# Patient Record
Sex: MALE | Employment: OTHER | URBAN - METROPOLITAN AREA
[De-identification: names, ages, dates, MRNs, and addresses within clinical notes are randomized per-mention and may not be internally consistent; named-entity substitution may affect disease eponyms.]

---

## 2017-01-18 RX ORDER — TRIAMTERENE/HYDROCHLOROTHIAZID 37.5-25 MG
TABLET ORAL
Qty: 30 TAB | Refills: 1 | Status: SHIPPED | OUTPATIENT
Start: 2017-01-18 | End: 2017-02-20 | Stop reason: SDUPTHER

## 2017-01-18 NOTE — TELEPHONE ENCOUNTER
Attempted to reach pt to discuss refill and f/u . No answer. Faxed back refill w/ note to pharmacy that he needs to return to see a provider. Refill was done for 1 month and 1 refill.

## 2017-02-03 ENCOUNTER — TELEPHONE (OUTPATIENT)
Dept: FAMILY MEDICINE CLINIC | Age: 52
End: 2017-02-03

## 2017-02-20 ENCOUNTER — HOSPITAL ENCOUNTER (OUTPATIENT)
Dept: LAB | Age: 52
Discharge: HOME | End: 2017-02-20

## 2017-02-20 ENCOUNTER — OFFICE VISIT (OUTPATIENT)
Dept: FAMILY MEDICINE CLINIC | Age: 52
End: 2017-02-20

## 2017-02-20 VITALS
TEMPERATURE: 98.2 F | WEIGHT: 254 LBS | HEIGHT: 69 IN | SYSTOLIC BLOOD PRESSURE: 147 MMHG | BODY MASS INDEX: 37.62 KG/M2 | HEART RATE: 68 BPM | DIASTOLIC BLOOD PRESSURE: 93 MMHG

## 2017-02-20 DIAGNOSIS — E78.00 HIGH CHOLESTEROL: ICD-10-CM

## 2017-02-20 DIAGNOSIS — I10 UNSPECIFIED ESSENTIAL HYPERTENSION: ICD-10-CM

## 2017-02-20 DIAGNOSIS — E11.9 CONTROLLED TYPE 2 DIABETES MELLITUS WITHOUT COMPLICATION, WITHOUT LONG-TERM CURRENT USE OF INSULIN (HCC): ICD-10-CM

## 2017-02-20 DIAGNOSIS — E11.9 CONTROLLED TYPE 2 DIABETES MELLITUS WITHOUT COMPLICATION, WITHOUT LONG-TERM CURRENT USE OF INSULIN (HCC): Primary | ICD-10-CM

## 2017-02-20 LAB
ALBUMIN SERPL BCP-MCNC: 4.4 G/DL (ref 3.5–5)
ALBUMIN/GLOB SERPL: 1.3 {RATIO} (ref 1.1–2.2)
ALP SERPL-CCNC: 102 U/L (ref 45–117)
ALT SERPL-CCNC: 51 U/L (ref 12–78)
ANION GAP BLD CALC-SCNC: 8 MMOL/L (ref 5–15)
AST SERPL W P-5'-P-CCNC: 31 U/L (ref 15–37)
BILIRUB SERPL-MCNC: 0.4 MG/DL (ref 0.2–1)
BUN SERPL-MCNC: 12 MG/DL (ref 6–20)
BUN/CREAT SERPL: 10 (ref 12–20)
CALCIUM SERPL-MCNC: 9.4 MG/DL (ref 8.5–10.1)
CHLORIDE SERPL-SCNC: 95 MMOL/L (ref 97–108)
CO2 SERPL-SCNC: 31 MMOL/L (ref 21–32)
CREAT SERPL-MCNC: 1.21 MG/DL (ref 0.7–1.3)
CREAT UR-MCNC: 55.1 MG/DL
EST. AVERAGE GLUCOSE BLD GHB EST-MCNC: 189 MG/DL
GLOBULIN SER CALC-MCNC: 3.4 G/DL (ref 2–4)
GLUCOSE POC: NORMAL MG/DL
GLUCOSE SERPL-MCNC: 141 MG/DL (ref 65–100)
HBA1C MFR BLD: 8.2 % (ref 4.2–6.3)
MICROALBUMIN UR-MCNC: <0.5 MG/DL
MICROALBUMIN/CREAT UR-RTO: NORMAL MG/G (ref 0–30)
POTASSIUM SERPL-SCNC: 3.5 MMOL/L (ref 3.5–5.1)
PROT SERPL-MCNC: 7.8 G/DL (ref 6.4–8.2)
SODIUM SERPL-SCNC: 134 MMOL/L (ref 136–145)
TSH SERPL DL<=0.05 MIU/L-ACNC: 2.35 UIU/ML (ref 0.36–3.74)

## 2017-02-20 PROCEDURE — 80053 COMPREHEN METABOLIC PANEL: CPT | Performed by: NURSE PRACTITIONER

## 2017-02-20 PROCEDURE — 83036 HEMOGLOBIN GLYCOSYLATED A1C: CPT | Performed by: NURSE PRACTITIONER

## 2017-02-20 PROCEDURE — 82043 UR ALBUMIN QUANTITATIVE: CPT | Performed by: NURSE PRACTITIONER

## 2017-02-20 PROCEDURE — 84443 ASSAY THYROID STIM HORMONE: CPT | Performed by: NURSE PRACTITIONER

## 2017-02-20 RX ORDER — TRIAMTERENE/HYDROCHLOROTHIAZID 37.5-25 MG
TABLET ORAL
Qty: 30 TAB | Refills: 1 | Status: SHIPPED | OUTPATIENT
Start: 2017-02-20 | End: 2017-04-04 | Stop reason: SDUPTHER

## 2017-02-20 RX ORDER — METOPROLOL TARTRATE 25 MG/1
TABLET, FILM COATED ORAL
Qty: 60 TAB | Refills: 1 | Status: SHIPPED | OUTPATIENT
Start: 2017-02-20 | End: 2017-03-21 | Stop reason: SDUPTHER

## 2017-02-20 RX ORDER — METFORMIN HYDROCHLORIDE 500 MG/1
TABLET, EXTENDED RELEASE ORAL
Qty: 90 TAB | Refills: 1 | Status: SHIPPED | OUTPATIENT
Start: 2017-02-20 | End: 2017-02-21 | Stop reason: SDUPTHER

## 2017-02-20 RX ORDER — LOVASTATIN 20 MG/1
40 TABLET ORAL
Qty: 60 TAB | Refills: 5 | Status: SHIPPED | OUTPATIENT
Start: 2017-02-20 | End: 2017-04-04 | Stop reason: SDUPTHER

## 2017-02-20 NOTE — PROGRESS NOTES
Assessment/Plan:       ICD-10-CM ICD-9-CM    1. Controlled type 2 diabetes mellitus without complication, without long-term current use of insulin (HCC) E11.9 250.00 AMB POC GLUCOSE BLOOD, BY GLUCOSE MONITORING DEVICE      MICROALBUMIN, UR, RAND W/ MICROALBUMIN/CREA RATIO      HEMOGLOBIN A1C WITH EAG      HM DIABETES FOOT EXAM      metFORMIN ER (GLUCOPHAGE XR) 500 mg tablet   2. Unspecified essential hypertension K34 527.9 METABOLIC PANEL, COMPREHENSIVE      triamterene-hydroCHLOROthiazide (MAXZIDE) 37.5-25 mg per tablet      metoprolol tartrate (LOPRESSOR) 25 mg tablet   3. High cholesterol E78.00 272.0 TSH 3RD GENERATION      lovastatin (MEVACOR) 20 mg tablet   10k training, starts next week until April; walks his dogs 30 min 3 times a week. Not eating much red meat; eats a green every day; had breakfast for supper last night at [facility]; also ate yogurt that morning and a banana and juice and tea, honey and lime. We reviewed high carbohydrate foods, that diet will need to be different during training and he can use that opportunity to try to lose some weight/eat less.   -check bp on the outside - less than 140/90? Will have him return for this.  -asking about prostate exam.  Will need to get history, discuss pros/cons of PSA, also asking about REMA  Follow-up Disposition:  Return in about 4 weeks (around 3/20/2017) for check htn; discuss prostate exam pros/cons. [facility]  Subjective:   Martha Galo is a 46 y.o. PATIENT REFUSED male who speaks [place]. Chief Complaint   Patient presents with    Medication Refill    History of Present Illness:  taking alkaselzer cold at nighth and mucinex.   Diastolic 11-21; 097-961  Fasting blood sugar: 110 low to 130, 151;   Current Outpatient Prescriptions   Medication Sig Dispense Refill    triamterene-hydroCHLOROthiazide (MAXZIDE) 37.5-25 mg per tablet TAKE ONE TABLET BY MOUTH ONE TIME DAILY 30 Tab 1    metFORMIN ER (GLUCOPHAGE XR) 500 mg tablet Take 1 pill with breakfast and then 2 with dinner 90 Tab 1    lovastatin (MEVACOR) 20 mg tablet Take 2 Tabs by mouth nightly. 60 Tab 5    metoprolol tartrate (LOPRESSOR) 25 mg tablet TAKE ONE TABLET BY MOUTH TWICE DAILY for hypertension 60 Tab 1       Review of Systems: Negative for: fever, chest pain, shortness of breath, leg swelling, exertional dyspnea, palpitations. Past Surgical History: He  has no past surgical history on file. Social History: He  reports that he has never smoked. He does not have any smokeless tobacco history on file. He reports that he does not drink alcohol or use illicit drugs. Objective:     Vitals:    02/20/17 0845 02/20/17 0851   BP: (!) 161/98 (!) 147/93   Pulse: 68 68   Temp: 98.2 °F (36.8 °C)    TempSrc: Oral    Weight: 254 lb (115.2 kg)    Height: 5' 8.5\" (1.74 m)     No LMP for male patient. Wt Readings from Last 2 Encounters:   02/20/17 254 lb (115.2 kg)   07/29/16 248 lb (112.5 kg)     Lab Review:  Results for orders placed or performed in visit on 02/20/17   AMB POC GLUCOSE BLOOD, BY GLUCOSE MONITORING DEVICE   Result Value Ref Range    Glucose POC fasting 164 mg/dL    ate banana and apple; nonfasting above  Physical Examination: Normal  foot exam.  General appearance - well developed, no acute distress. Chest - clear to auscultation. Heart - regular rate and rhythm without murmurs, rubs, or gallops. Abdomen - bowel sounds present x 4, NT, ND. Extremities - pulses intact. No peripheral edema. Assessment/Plan:   Cleopatra Fry was seen today for medication refill. Diagnoses and all orders for this visit:    Controlled type 2 diabetes mellitus without complication, without long-term current use of insulin (HCC)  -     AMB POC GLUCOSE BLOOD, BY GLUCOSE MONITORING DEVICE  -     MICROALBUMIN, UR, RAND W/ MICROALBUMIN/CREA RATIO; Future  -     HEMOGLOBIN A1C WITH EAG; Future  -      DIABETES FOOT EXAM  -     metFORMIN ER (GLUCOPHAGE XR) 500 mg tablet;  Take 1 pill with breakfast and then 2 with dinner    Unspecified essential hypertension  -     METABOLIC PANEL, COMPREHENSIVE; Future  -     triamterene-hydroCHLOROthiazide (MAXZIDE) 37.5-25 mg per tablet; TAKE ONE TABLET BY MOUTH ONE TIME DAILY  -     metoprolol tartrate (LOPRESSOR) 25 mg tablet; TAKE ONE TABLET BY MOUTH TWICE DAILY for hypertension    High cholesterol  -     TSH 3RD GENERATION; Future  -     lovastatin (MEVACOR) 20 mg tablet; Take 2 Tabs by mouth nightly.    -eye exam was more than 3 years ago; gave him a letter for optometrist to fax back to me at [facility]  Follow-up Disposition:  Return in about 4 weeks (around 3/20/2017) for check htn; discuss prostate exam pros/cons. Ilia Jacinto, MSN, RN, FNP-BC, BC-ADM  Janiya Anne expressed understanding of this plan. Assessment/Plan:       ICD-10-CM ICD-9-CM    1. Controlled type 2 diabetes mellitus without complication, without long-term current use of insulin (HCC) E11.9 250.00 AMB POC GLUCOSE BLOOD, BY GLUCOSE MONITORING DEVICE      MICROALBUMIN, UR, RAND W/ MICROALBUMIN/CREA RATIO      HEMOGLOBIN A1C WITH EAG      HM DIABETES FOOT EXAM      metFORMIN ER (GLUCOPHAGE XR) 500 mg tablet   2. Unspecified essential hypertension F81 309.8 METABOLIC PANEL, COMPREHENSIVE      triamterene-hydroCHLOROthiazide (MAXZIDE) 37.5-25 mg per tablet      metoprolol tartrate (LOPRESSOR) 25 mg tablet   3. High cholesterol E78.00 272.0 TSH 3RD GENERATION      lovastatin (MEVACOR) 20 mg tablet    Greater than 50% of this 25 minute visit was spent in face-to-face counseling/coordination of care regarding diabetes management. Follow-up Disposition:  Return in about 4 weeks (around 3/20/2017) for check htn; discuss prostate exam pros/cons.      Health Maintenance Due   Topic    Hepatitis C Screening     EYE EXAM RETINAL OR DILATED Q1     Pneumococcal 19-64 Medium Risk (1 of 1 - PPSV23)    DTaP/Tdap/Td series (1 - Tdap)    FOOT EXAM Q1     FOBT Q 1 YEAR AGE 50-75     MICROALBUMIN Q1     INFLUENZA AGE 9 TO ADULT     LIPID PANEL Q1     HEMOGLOBIN A1C Q6M          [facility] [place]  Subjective:   Stefani Moss is a 46 y.o. PATIENT REFUSED male who speaks [place]. Chief Complaint   Patient presents with    Medication Refill   Social History: He reports that he has never smoked. He does not have any smokeless tobacco history on file. He reports that he does not drink alcohol or use illicit drugs. Family History: His family history is not on file. Outpatient Medications Prior to Visit   Medication Sig Dispense Refill    triamterene-hydroCHLOROthiazide (MAXZIDE) 37.5-25 mg per tablet TAKE ONE TABLET BY MOUTH ONE TIME DAILY 30 Tab 1    metFORMIN ER (GLUCOPHAGE XR) 500 mg tablet Take 1 pill with breakfast and then 2 with dinner 270 Tab 1    lovastatin (MEVACOR) 20 mg tablet Take 2 Tabs by mouth nightly. 60 Tab 5    metoprolol tartrate (LOPRESSOR) 25 mg tablet TAKE ONE TABLET BY MOUTH TWICE DAILY for hypertension 180 Tab 1     No facility-administered medications prior to visit. Surgical History: No past surgical history on file. Objective:     Vitals:    02/20/17 0845 02/20/17 0851   BP: (!) 161/98 (!) 147/93   Pulse: 68 68   Temp: 98.2 °F (36.8 °C)    TempSrc: Oral    Weight: 254 lb (115.2 kg)    Height: 5' 8.5\" (1.74 m)     No LMP for male patient. Results for orders placed or performed in visit on 02/20/17   AMB POC GLUCOSE BLOOD, BY GLUCOSE MONITORING DEVICE   Result Value Ref Range    Glucose POC fasting 164 mg/dL      Wt Readings from Last 2 Encounters:   02/20/17 254 lb (115.2 kg)   07/29/16 248 lb (112.5 kg)    Weight increased;    Hemoglobin A1c   Date Value Ref Range Status   07/29/2016 7.5 (H) 4.2 - 6.3 % Final   11/24/2015 7.6 (H) 4.2 - 6.3 % Final    A1C decreased;   Lab Results   Component Value Date/Time    Microalbumin/Creat ratio (mg/g creat)  03/17/2014 11:44 AM     Cannot calculate ratio due to micro albumin result outside reportable range. Microalbumin,urine random <0.50 03/17/2014 11:44 AM    Creatinine 1.11 11/24/2015 02:03 PM      Lab Results   Component Value Date/Time    GFR est AA >60 11/24/2015 02:03 PM    GFR est non-AA >60 11/24/2015 02:03 PM       Lab Results   Component Value Date/Time    Cholesterol, total 143 11/24/2015 02:03 PM    HDL Cholesterol 47 11/24/2015 02:03 PM    LDL, calculated 71 11/24/2015 02:03 PM    Triglyceride 125 11/24/2015 02:03 PM    CHOL/HDL Ratio 3.0 11/24/2015 02:03 PM      Lab Results   Component Value Date/Time    ALT (SGPT) 45 11/24/2015 02:03 PM    AST (SGOT) 25 11/24/2015 02:03 PM    Alk. phosphatase 79 11/24/2015 02:03 PM    Bilirubin, total 0.6 11/24/2015 02:03 PM     Constitutional: He appears well-developed. Eyes: EOM are normal. Pupils are equal, round, and reactive to light. Neck: Neck supple. No thyromegaly present. Cardiovascular: Normal rate, regular rhythm, normal heart sounds and intact distal pulses. No murmur heard. Pulmonary/Chest: Effort normal and breath sounds normal.   Musculoskeletal: He exhibits no edema. No ulcers of the lower extremities. Assessment/Plan:   Jackie Oleary was seen today for medication refill. Diagnoses and all orders for this visit:    Controlled type 2 diabetes mellitus without complication, without long-term current use of insulin ([facility])  -     AMB POC GLUCOSE BLOOD, BY GLUCOSE MONITORING DEVICE  -     MICROALBUMIN, UR, RAND W/ MICROALBUMIN/CREA RATIO; Future  -     HEMOGLOBIN A1C WITH EAG; Future  -      DIABETES FOOT EXAM  -     metFORMIN ER (GLUCOPHAGE XR) 500 mg tablet; Take 1 pill with breakfast and then 2 with dinner    Unspecified essential hypertension  -     METABOLIC PANEL, COMPREHENSIVE;  Future  -     triamterene-hydroCHLOROthiazide (MAXZIDE) 37.5-25 mg per tablet; TAKE ONE TABLET BY MOUTH ONE TIME DAILY  -     metoprolol tartrate (LOPRESSOR) 25 mg tablet; TAKE ONE TABLET BY MOUTH TWICE DAILY for hypertension    High cholesterol  - TSH 3RD GENERATION; Future  -     lovastatin (MEVACOR) 20 mg tablet; Take 2 Tabs by mouth nightly. 158/96 bp right arm  Diabetes Mellitus type 2, under Fair  control. Blood pressure under Poor control. Greater than 50% of this 25 minute visit was spent in face-to-face counseling/coordination of care regarding diabetes management. Follow-up Disposition:  Return in about 4 weeks (around 3/20/2017) for check htn; discuss prostate exam pros/cons. Erika Small, MSN, RN, FNP-BC, BC-ADM  Vero Baker expressed understanding of this plan.

## 2017-02-20 NOTE — LETTER
Lorenzo Dominguez 2/20/2017 For your diabetes, please have your eyes checked by an eye doctor (optometrist) before your next visit. Please ask the eye doctor's office to fax your results including diabetes findings to [phone], attention: Trang Sheehan. You may call our office at [phone] with any questions. Thank you!

## 2017-02-20 NOTE — PATIENT INSTRUCTIONS
Stefani Moss  2/20/2017      For your diabetes, please have your eyes checked by an eye doctor (optometrist) before your next visit. Please ask the eye doctor's office to fax your results including diabetes findings to [phone], attention: Hans Johnston. You may call our office at [phone] with any questions. Thank you!

## 2017-02-20 NOTE — PROGRESS NOTES
Coordination of Care  1. Have you been to the ER, urgent care clinic since your last visit? Hospitalized since your last visit? No    2. Have you seen or consulted any other health care providers outside of the [facility] since your last visit? Include any pap smears or colon screening.  No    Medications  Medication Reconciliation Performed: no  Patient does need refills     Learning Assessment Complete? yes  Results for orders placed or performed in visit on 02/20/17   AMB POC GLUCOSE BLOOD, BY GLUCOSE MONITORING DEVICE   Result Value Ref Range    Glucose POC fasting 164 mg/dL

## 2017-02-20 NOTE — PROGRESS NOTES
Printed AVS, provided to pt and reviewed. Pt indicated understanding and had no questions. Pt told to please review the instructions for collecting 3 stool samples and placing on the cards provided. Be sure to put your name and date of collection on all 3 cards. Place these in the plastic lab bag provided and return the cards within 14 days of collection of first sample. Provided pt with eyecare resources.  Raffi Valverde RN

## 2017-02-21 DIAGNOSIS — E11.9 CONTROLLED TYPE 2 DIABETES MELLITUS WITHOUT COMPLICATION, WITHOUT LONG-TERM CURRENT USE OF INSULIN (HCC): ICD-10-CM

## 2017-02-21 DIAGNOSIS — E11.65 TYPE 2 DIABETES MELLITUS WITH HYPERGLYCEMIA, WITHOUT LONG-TERM CURRENT USE OF INSULIN (HCC): Primary | ICD-10-CM

## 2017-02-21 RX ORDER — METFORMIN HYDROCHLORIDE 500 MG/1
TABLET, EXTENDED RELEASE ORAL
Qty: 120 TAB | Refills: 1 | Status: SHIPPED | OUTPATIENT
Start: 2017-02-21 | End: 2017-04-04 | Stop reason: SDUPTHER

## 2017-02-22 NOTE — PROGRESS NOTES
Lab Results   Component Value Date/Time    Hemoglobin A1c 8.2 02/20/2017 10:54 AM    Hemoglobin A1c 7.5 07/29/2016 09:35 AM     A1c has gotten worse. Please increase metformin to 2 po bid. I have sent in an updated prescription to your pharmacy.

## 2017-02-22 NOTE — PROGRESS NOTES
Telephone call made to the patient to review the provider's result note and Metformin dose change. The patient expressed understanding and confirmed the pharmacy location and his follow-up appointment with [facility].  Iris Ignacio RN

## 2017-03-21 DIAGNOSIS — I10 UNSPECIFIED ESSENTIAL HYPERTENSION: ICD-10-CM

## 2017-03-22 RX ORDER — METOPROLOL TARTRATE 25 MG/1
TABLET, FILM COATED ORAL
Qty: 60 TAB | Refills: 1 | Status: SHIPPED | OUTPATIENT
Start: 2017-03-22 | End: 2017-04-04 | Stop reason: SDUPTHER

## 2017-04-04 ENCOUNTER — HOSPITAL ENCOUNTER (OUTPATIENT)
Dept: LAB | Age: 52
Discharge: HOME | End: 2017-04-04

## 2017-04-04 ENCOUNTER — OFFICE VISIT (OUTPATIENT)
Dept: FAMILY MEDICINE CLINIC | Age: 52
End: 2017-04-04

## 2017-04-04 VITALS
DIASTOLIC BLOOD PRESSURE: 87 MMHG | WEIGHT: 247.2 LBS | BODY MASS INDEX: 36.61 KG/M2 | TEMPERATURE: 98.6 F | HEART RATE: 64 BPM | SYSTOLIC BLOOD PRESSURE: 134 MMHG | HEIGHT: 69 IN

## 2017-04-04 DIAGNOSIS — E11.9 CONTROLLED TYPE 2 DIABETES MELLITUS WITHOUT COMPLICATION, WITHOUT LONG-TERM CURRENT USE OF INSULIN (HCC): Primary | ICD-10-CM

## 2017-04-04 DIAGNOSIS — Z12.11 COLON CANCER SCREENING: ICD-10-CM

## 2017-04-04 DIAGNOSIS — I10 UNSPECIFIED ESSENTIAL HYPERTENSION: ICD-10-CM

## 2017-04-04 DIAGNOSIS — E78.00 HIGH CHOLESTEROL: ICD-10-CM

## 2017-04-04 LAB
CHOLEST SERPL-MCNC: 203 MG/DL
GLUCOSE POC: NORMAL MG/DL
HDLC SERPL-MCNC: 51 MG/DL
HDLC SERPL: 4 {RATIO} (ref 0–5)
HEMOCCULT STL QL: NEGATIVE
LDLC SERPL CALC-MCNC: 131.4 MG/DL (ref 0–100)
LIPID PROFILE,FLP: ABNORMAL
TRIGL SERPL-MCNC: 103 MG/DL (ref ?–150)
VALID INTERNAL CONTROL?: YES
VLDLC SERPL CALC-MCNC: 20.6 MG/DL

## 2017-04-04 PROCEDURE — 80061 LIPID PANEL: CPT | Performed by: NURSE PRACTITIONER

## 2017-04-04 RX ORDER — LOVASTATIN 20 MG/1
40 TABLET ORAL
Qty: 180 TAB | Refills: 1 | Status: SHIPPED | OUTPATIENT
Start: 2017-04-04 | End: 2018-01-15 | Stop reason: SDUPTHER

## 2017-04-04 RX ORDER — TRIAMTERENE/HYDROCHLOROTHIAZID 37.5-25 MG
TABLET ORAL
Qty: 90 TAB | Refills: 1 | Status: SHIPPED | OUTPATIENT
Start: 2017-04-04 | End: 2017-11-01 | Stop reason: SDUPTHER

## 2017-04-04 RX ORDER — METFORMIN HYDROCHLORIDE 500 MG/1
TABLET, EXTENDED RELEASE ORAL
Qty: 360 TAB | Refills: 1 | Status: SHIPPED | OUTPATIENT
Start: 2017-04-04 | End: 2017-10-13 | Stop reason: SDUPTHER

## 2017-04-04 RX ORDER — METOPROLOL TARTRATE 25 MG/1
TABLET, FILM COATED ORAL
Qty: 180 TAB | Refills: 1 | Status: SHIPPED | OUTPATIENT
Start: 2017-04-04 | End: 2017-10-13 | Stop reason: SDUPTHER

## 2017-04-04 NOTE — PROGRESS NOTES
Coordination of Care  1. Have you been to the ER, urgent care clinic since your last visit? Hospitalized since your last visit? No    2. Have you seen or consulted any other health care providers outside of the [address] [place] [address] since your last visit? Include any pap smears or colon screening.  No    Medications  Medication Reconciliation Performed: no  Patient does need refills     Learning Assessment Complete? yes  Results for orders placed or performed in visit on 04/04/17   AMB POC GLUCOSE BLOOD, BY GLUCOSE MONITORING DEVICE   Result Value Ref Range    Glucose POC fasting 132 mg/dL

## 2017-04-04 NOTE — LETTER
2017 Mr. Balbina Sanchez  1965 # [id number] [facility] [address] To Whom It May Concern: MrMontana Garduno needs his annual dilated retinal exam for his diabetes. After your examination, kindly fax the exam results including diabetes findings to 
[phone],   ATTENTION: Aide Velazquez You may call our office at [phone] with any questions. Thank you! Sincerely, Adele Brock DNP, FNP-BC, BC-ADM [facility]

## 2017-04-04 NOTE — PROGRESS NOTES
Avs discussed with Frank Wilder by Discharge Nurse Katrin Moseley LPN. Discussed medications prescribed today, good rx coupon given. Pt given stool card results, Negative. Pt to make appt to return in 3 months. Pt verbalized understanding AVS printed and given to patient.  Katrin Moseley LPN

## 2017-04-05 NOTE — PROGRESS NOTES
Chholesterol shows improvement in LDL (the bad cholesterol is lower) and HDL (the good cholesterol is higher, which is good). Triglycerides are the lowest they've ever been. Being active is making a difference!

## 2017-04-06 NOTE — PROGRESS NOTES
Attempted to reach patient by telephone. Left VM with general message to call [facility] office.  Nadir Cortes RN

## 2017-04-12 NOTE — PROGRESS NOTES
Telephone call made to the patient to review the provider's lab result note. He was very pleased and stated he will continue the diet and exercise changes he has made. The patient understands that the calendar is not available yet to make follow-up appointments in July (recommended 3 month follow-up)and that he will receive a call from the [facility] as soon as it is.  Jonnathan Lyles RN

## 2017-10-13 DIAGNOSIS — E11.9 CONTROLLED TYPE 2 DIABETES MELLITUS WITHOUT COMPLICATION, WITHOUT LONG-TERM CURRENT USE OF INSULIN (HCC): ICD-10-CM

## 2017-10-13 DIAGNOSIS — I10 ESSENTIAL HYPERTENSION: ICD-10-CM

## 2017-10-17 RX ORDER — METOPROLOL TARTRATE 25 MG/1
TABLET, FILM COATED ORAL
Qty: 180 TAB | Refills: 0 | Status: SHIPPED | OUTPATIENT
Start: 2017-10-17 | End: 2018-01-15 | Stop reason: SDUPTHER

## 2017-10-17 RX ORDER — METFORMIN HYDROCHLORIDE 500 MG/1
TABLET, EXTENDED RELEASE ORAL
Qty: 360 TAB | Refills: 0 | Status: SHIPPED | OUTPATIENT
Start: 2017-10-17 | End: 2018-01-15 | Stop reason: SDUPTHER

## 2017-10-17 NOTE — TELEPHONE ENCOUNTER
General phone message left that medications were sent in today for 3 months and needs to return for f/u. Asked to return call to [facility] nurse to schedule a f/u appointment or to make \"the line\" to see a medical provider.

## 2017-10-20 ENCOUNTER — TELEPHONE (OUTPATIENT)
Dept: FAMILY MEDICINE CLINIC | Age: 52
End: 2017-10-20

## 2017-10-20 DIAGNOSIS — I10 ESSENTIAL HYPERTENSION: ICD-10-CM

## 2017-10-22 DIAGNOSIS — I10 ESSENTIAL HYPERTENSION: ICD-10-CM

## 2017-10-22 RX ORDER — TRIAMTERENE/HYDROCHLOROTHIAZID 37.5-25 MG
TABLET ORAL
Qty: 90 TAB | Refills: 1 | Status: CANCELLED | OUTPATIENT
Start: 2017-10-22

## 2017-10-25 ENCOUNTER — TELEPHONE (OUTPATIENT)
Dept: FAMILY MEDICINE CLINIC | Age: 52
End: 2017-10-25

## 2017-10-25 NOTE — TELEPHONE ENCOUNTER
Ky Boggs at [facility] in [facility] called to fu on request for refill sent 10/22 for Hydrochlorothiazide.     Javier Baeza April

## 2017-10-25 NOTE — TELEPHONE ENCOUNTER
LM again for pt. In review of chart, Felipe Silver left message telling pt to either make the line or to call and make an appointment when we have an opening. If he calls back, please schedule him for a follow up appointment when there is an opening or if he would prefer to come sooner than an opening if found, please tell him to make the line to be seen.  Courtney Coronel RN

## 2017-10-27 NOTE — TELEPHONE ENCOUNTER
Tc to the pt's listed number for home. No answer. A generic message was left to call the [facility] office back and speak with the nurse. Alfonzo Crawford RN     Tc to the pt's mobile number. He answered. The pt stated the other nurse that had called him stated he did not need to come fo an appt for 3 month's. The pt has not been seen in the clinic since 04/04/17. The pt was supposed to RTC in July. He stated he was told to come in Dec by the nurse he had spoken to, but the calender was not out yet so he could not schedule an appt at this time. He stated he just needed to come before he ran out of his medicines. However, he was only given refills for 2 of the medications on his list. The Pharmacy had told him he needed to call the [facility] and ask for refill of his Maxzide. The Lovastatin was also not refilled per chart review either. Told pt the message will be routed to the provider.

## 2017-10-27 NOTE — TELEPHONE ENCOUNTER
Tc to the [facility] pharmacy returning Magali's call. The Pharmacy Tech stated she was not there today. Spoke with another Pharmacist. Emma Ruiz had called regarding a refill request on Maxzide sent 10/22/17. The pt's Maxizide was not refilled. Only Metformin and Atenolol was refilled on 10/17/17. Was not clear on why the Maxzide was not refilled. Pt needed to be seen by a provider. Has not been seen since 04/04/17. Routing to provider. Will attempt to call the pt again and see about an appt or if needed to be seen sooner he needs to make the line.  Zoila Leventhal, RN

## 2017-10-30 NOTE — TELEPHONE ENCOUNTER
Tc to the pt at his home number. No answer. Only a beep to leave a message. Generic message left to return call to the [facility] [phone]. Davonte Whiting RN     Tc to the pt's mobile phone. The pt answered. The pt requesting refill on Maxzide. He stated his 2 other medications had been refilled, but not the Maxzide. The pt was told a message had been sent to the provider. Would route another message to provider regarding the Maxzide. The pt was offered an appt. He stated he would rather come in Dec. Pt scheduled for an appt in Dec 5th, at 1:15 pm. The pt also asking that all medicine have th esame amount of refills so he can get refills of all 3 medicines at the same time.  Davonte Whiting RN

## 2017-11-01 DIAGNOSIS — E11.9 CONTROLLED TYPE 2 DIABETES MELLITUS WITHOUT COMPLICATION, WITHOUT LONG-TERM CURRENT USE OF INSULIN (HCC): Primary | ICD-10-CM

## 2017-11-01 DIAGNOSIS — I10 ESSENTIAL HYPERTENSION: ICD-10-CM

## 2017-11-01 RX ORDER — TRIAMTERENE/HYDROCHLOROTHIAZID 37.5-25 MG
TABLET ORAL
Qty: 90 TAB | Refills: 0 | Status: SHIPPED | OUTPATIENT
Start: 2017-11-01 | End: 2018-01-15 | Stop reason: SDUPTHER

## 2017-11-01 NOTE — TELEPHONE ENCOUNTER
I have ordered his medication. Please return for labs (he does not have to fast) 1 to 2 weeks BEFORE his next appointment. I have ordered them.

## 2017-11-01 NOTE — TELEPHONE ENCOUNTER
Tc to the pt. Spoke with the pt gave him the provoders message that the rx for the Maxzide had been sent [facility] to [facility] [place] [address]. He was also given the message from provider to RTC for non fasting labs 1-2 weeks at any [facility] clinic for Labs only. The pt verbalized understanding and denied further questions.  Gio Mathis RN

## 2018-01-15 ENCOUNTER — HOSPITAL ENCOUNTER (OUTPATIENT)
Dept: LAB | Age: 53
Discharge: HOME | End: 2018-01-15

## 2018-01-15 ENCOUNTER — OFFICE VISIT (OUTPATIENT)
Dept: FAMILY MEDICINE CLINIC | Age: 53
End: 2018-01-15

## 2018-01-15 VITALS
BODY MASS INDEX: 37.52 KG/M2 | WEIGHT: 251 LBS | TEMPERATURE: 97.6 F | HEART RATE: 67 BPM | DIASTOLIC BLOOD PRESSURE: 92 MMHG | SYSTOLIC BLOOD PRESSURE: 145 MMHG

## 2018-01-15 DIAGNOSIS — E11.9 CONTROLLED TYPE 2 DIABETES MELLITUS WITHOUT COMPLICATION, WITHOUT LONG-TERM CURRENT USE OF INSULIN (HCC): ICD-10-CM

## 2018-01-15 DIAGNOSIS — I10 ESSENTIAL HYPERTENSION: ICD-10-CM

## 2018-01-15 DIAGNOSIS — E78.49 OTHER HYPERLIPIDEMIA: ICD-10-CM

## 2018-01-15 DIAGNOSIS — E11.9 CONTROLLED TYPE 2 DIABETES MELLITUS WITHOUT COMPLICATION, WITHOUT LONG-TERM CURRENT USE OF INSULIN (HCC): Primary | ICD-10-CM

## 2018-01-15 DIAGNOSIS — E78.00 HIGH CHOLESTEROL: ICD-10-CM

## 2018-01-15 DIAGNOSIS — Z23 ENCOUNTER FOR IMMUNIZATION: ICD-10-CM

## 2018-01-15 DIAGNOSIS — Z13.9 ENCOUNTER FOR SCREENING: ICD-10-CM

## 2018-01-15 LAB
EST. AVERAGE GLUCOSE BLD GHB EST-MCNC: 169 MG/DL
GLUCOSE POC: NORMAL MG/DL
HBA1C MFR BLD: 7.5 % (ref 4.2–6.3)

## 2018-01-15 PROCEDURE — 83036 HEMOGLOBIN GLYCOSYLATED A1C: CPT | Performed by: NURSE PRACTITIONER

## 2018-01-15 RX ORDER — METOPROLOL TARTRATE 25 MG/1
TABLET, FILM COATED ORAL
Qty: 180 TAB | Refills: 0 | Status: SHIPPED | OUTPATIENT
Start: 2018-01-15 | End: 2018-01-16 | Stop reason: SDUPTHER

## 2018-01-15 RX ORDER — LOVASTATIN 20 MG/1
40 TABLET ORAL
Qty: 180 TAB | Refills: 1 | Status: SHIPPED | OUTPATIENT
Start: 2018-01-15 | End: 2018-04-10 | Stop reason: SDUPTHER

## 2018-01-15 RX ORDER — TRIAMTERENE/HYDROCHLOROTHIAZID 37.5-25 MG
TABLET ORAL
Qty: 90 TAB | Refills: 0 | Status: SHIPPED | OUTPATIENT
Start: 2018-01-15 | End: 2018-04-10 | Stop reason: SDUPTHER

## 2018-01-15 RX ORDER — METFORMIN HYDROCHLORIDE 500 MG/1
TABLET, EXTENDED RELEASE ORAL
Qty: 360 TAB | Refills: 0 | Status: SHIPPED | OUTPATIENT
Start: 2018-01-15 | End: 2018-01-16 | Stop reason: SDUPTHER

## 2018-01-15 NOTE — PROGRESS NOTES
Requests flu vaccine; denies fever, egg allergy. Immunization given per protocol [facility] VIS information sheet given, explained possible S/E. Reviewed sx indicating need to be seen in ER. Pt had no adverse reaction at time of discharge.  Emmett Kelley RN

## 2018-01-15 NOTE — PROGRESS NOTES
Assessment/Plan:       ICD-10-CM ICD-9-CM    1. Controlled type 2 diabetes mellitus without complication, without long-term current use of insulin (HCC) E11.9 250.00 HEMOGLOBIN A1C WITH EAG      DISCONTINUED: metFORMIN ER (GLUCOPHAGE XR) 500 mg tablet   2. Encounter for screening Z13.9 V82.9 AMB POC GLUCOSE BLOOD, BY GLUCOSE MONITORING DEVICE   3. Essential hypertension I10 401.9 triamterene-hydroCHLOROthiazide (MAXZIDE) 37.5-25 mg per tablet      DISCONTINUED: metoprolol tartrate (LOPRESSOR) 25 mg tablet   4. Other hyperlipidemia E78.4 272.4    5. High cholesterol E78.00 272.0 lovastatin (MEVACOR) 20 mg tablet   6. Encounter for immunization Z23 V03.89 INFLUENZA VIRUS VAC QUAD,SPLIT,PRESV FREE SYRINGE IM    Greater than 50% of this 25 minute visit was spent in face-to-face counseling/coordination of care regarding diabetes management. Follow-up Disposition:  Return in about 3 months (around 4/15/2018) for diabetes, hypertension, primary care needs. [facility] [address] [facility] - [address] [address]  Phone: [phone] Fax: [phone]    [address] [address]  Phone: [phone] Fax: [phone]      [facility]  Subjective:     Chief Complaint   Patient presents with    Diabetes     f/u    Hypertension    Medication Refill    Immunization/Injection    Migdaliacecil Medinasimone is a 48 y.o. PATIENT REFUSED male who speaks [place].  used? no   Diabetes   Brought in medications? no Last ate:  this morning, fruit  Taking medications as prescribed? Yes, this morning  Working:  yes 7:30 to 4:00, M through F  Physical Activity? No  Measuring glucoses?  yes  131-140, highest 145, lowest 127    Outpatient Medications Prior to Visit   Medication Sig Dispense Refill    triamterene-hydroCHLOROthiazide (MAXZIDE) 37.5-25 mg per tablet TAKE ONE TABLET BY MOUTH ONE TIME DAILY 90 Tab 0    metFORMIN ER (GLUCOPHAGE XR) 500 mg tablet TAKE 2 TABLETS DAILY WITH BREAKFAST AND THEN 2 TABLETS DAILY WITH DINNER 360 Tab 0    metoprolol tartrate (LOPRESSOR) 25 mg tablet TAKE ONE TABLET BY MOUTH TWICE DAILY FOR HYPERTENSION 180 Tab 0    lovastatin (MEVACOR) 20 mg tablet Take 2 Tabs by mouth nightly. 180 Tab 1     No facility-administered medications prior to visit. ROS:   no polyuria or polydipsia, no chest pain, dyspnea or TIA's, no numbness, tingling or pain in extremities. Social History: He reports that he has never smoked. He does not have any smokeless tobacco history on file. He reports that he does not drink alcohol or use illicit drugs. Family History: His family history is not on file. Surgical History: No past surgical history on file. Objective:     Vitals:    01/15/18 0932 01/15/18 0936   BP: (!) 148/95 (!) 145/92   Pulse: 66 67   Temp: 97.6 °F (36.4 °C)    TempSrc: Oral    Weight: 251 lb (113.9 kg)     No LMP for male patient. Results for orders placed or performed during the hospital encounter of 01/15/18   HEMOGLOBIN A1C WITH EAG   Result Value Ref Range    Hemoglobin A1c 7.5 (H) 4.2 - 6.3 %    Est. average glucose 169 mg/dL   Results for orders placed or performed in visit on 01/15/18   AMB POC GLUCOSE BLOOD, BY GLUCOSE MONITORING DEVICE   Result Value Ref Range    Glucose  F mg/dL      Wt Readings from Last 2 Encounters:   01/15/18 251 lb (113.9 kg)   04/04/17 247 lb 3.2 oz (112.1 kg)    Weight increased; has gained 4 lbs; has registered for the [employer] in April; walks 33 min a day, walks his dog 3 times a week. He enjoys the nature and enjoys walking the dog.   Hemoglobin A1c   Date Value Ref Range Status   02/20/2017 8.2 (H) 4.2 - 6.3 % Final   07/29/2016 7.5 (H) 4.2 - 6.3 % Final    A1C increased;   Lab Results   Component Value Date/Time    Microalbumin/Creat ratio (mg/g creat)  02/20/2017 10:54 AM     Cannot calculate ratio due to micro albumin result outside reportable range. Microalbumin,urine random <0.50 02/20/2017 10:54 AM    Creatinine 1.21 02/20/2017 10:54 AM      Lab Results   Component Value Date/Time    GFR est AA >60 02/20/2017 10:54 AM    GFR est non-AA >60 02/20/2017 10:54 AM       Lab Results   Component Value Date/Time    Cholesterol, total 203 04/04/2017 01:40 PM    HDL Cholesterol 51 04/04/2017 01:40 PM    LDL, calculated 131.4 04/04/2017 01:40 PM    Triglyceride 103 04/04/2017 01:40 PM    CHOL/HDL Ratio 4.0 04/04/2017 01:40 PM      Lab Results   Component Value Date/Time    ALT (SGPT) 51 02/20/2017 10:54 AM    AST (SGOT) 31 02/20/2017 10:54 AM    Alk. phosphatase 102 02/20/2017 10:54 AM    Bilirubin, total 0.4 02/20/2017 10:54 AM   Need eye letter again. Constitutional: He appears well-developed. Eyes: EOM are normal. Pupils are equal, round, and reactive to light. Neck: Neck supple. No thyromegaly present. Cardiovascular: Normal rate, regular rhythm, normal heart sounds and intact distal pulses. No murmur heard. Pulmonary/Chest: Effort normal and breath sounds normal.   Musculoskeletal: He exhibits no edema. No ulcers of the lower extremities. Assessment/Plan:   Diagnoses and all orders for this visit:    1. Controlled type 2 diabetes mellitus without complication, without long-term current use of insulin (HCC)  -     HEMOGLOBIN A1C WITH EAG; Future    2. Encounter for screening  -     AMB POC GLUCOSE BLOOD, BY GLUCOSE MONITORING DEVICE    3. Essential hypertension  -     triamterene-hydroCHLOROthiazide (MAXZIDE) 37.5-25 mg per tablet; TAKE ONE TABLET BY MOUTH ONE TIME DAILY    4. Other hyperlipidemia    5. High cholesterol  -     lovastatin (MEVACOR) 20 mg tablet; Take 2 Tabs by mouth nightly. 6. Encounter for immunization  -     Influenza virus vaccine (QUADRIVALENT PRES FREE SYRINGE) IM (72811)    He wants to donate blood. Can he? He will check with blood bank.     1-2 seconds, a little twitch, twice in the same day. He was moving, maybe lifting something an hour before that. Diabetes Mellitus type 2, under unknown control. Blood pressure under Fair control. Greater than 50% of this 25 minute visit was spent in face-to-face counseling/coordination of care regarding diabetes management. Follow-up Disposition:  Return in about 3 months (around 4/15/2018) for diabetes, hypertension, primary care needs. 127-131 over 79-81; highest 140/82  Eneida Duvall DNP, FNP-BC, BC-ADM  Jeff Angeles expressed understanding of this plan.

## 2018-01-15 NOTE — PROGRESS NOTES
Coordination of Care  1. Have you been to the ER, urgent care clinic since your last visit? Hospitalized since your last visit? No    2. Have you seen or consulted any other health care providers outside of the [address] [place] [address] since your last visit? Include any pap smears or colon screening. No    Medications  Does the patient need refills?  YES    Learning Assessment Complete? yes  Results for orders placed or performed in visit on 01/15/18   AMB POC GLUCOSE BLOOD, BY GLUCOSE MONITORING DEVICE   Result Value Ref Range    Glucose  F mg/dL

## 2018-01-16 DIAGNOSIS — E11.9 CONTROLLED TYPE 2 DIABETES MELLITUS WITHOUT COMPLICATION, WITHOUT LONG-TERM CURRENT USE OF INSULIN (HCC): ICD-10-CM

## 2018-01-16 DIAGNOSIS — I10 ESSENTIAL HYPERTENSION: ICD-10-CM

## 2018-01-16 RX ORDER — METOPROLOL TARTRATE 25 MG/1
TABLET, FILM COATED ORAL
Qty: 180 TAB | Refills: 0 | Status: SHIPPED | OUTPATIENT
Start: 2018-01-16 | End: 2018-04-10 | Stop reason: SDUPTHER

## 2018-01-16 RX ORDER — METFORMIN HYDROCHLORIDE 500 MG/1
TABLET, EXTENDED RELEASE ORAL
Qty: 360 TAB | Refills: 0 | Status: SHIPPED | OUTPATIENT
Start: 2018-01-16 | End: 2018-04-10 | Stop reason: SDUPTHER

## 2018-04-10 ENCOUNTER — OFFICE VISIT (OUTPATIENT)
Dept: FAMILY MEDICINE CLINIC | Age: 53
End: 2018-04-10

## 2018-04-10 ENCOUNTER — HOSPITAL ENCOUNTER (OUTPATIENT)
Dept: LAB | Age: 53
Discharge: HOME | End: 2018-04-10

## 2018-04-10 VITALS
BODY MASS INDEX: 37.18 KG/M2 | DIASTOLIC BLOOD PRESSURE: 88 MMHG | HEIGHT: 69 IN | HEART RATE: 67 BPM | TEMPERATURE: 98.3 F | SYSTOLIC BLOOD PRESSURE: 132 MMHG | WEIGHT: 251 LBS

## 2018-04-10 DIAGNOSIS — E78.49 OTHER HYPERLIPIDEMIA: ICD-10-CM

## 2018-04-10 DIAGNOSIS — E11.9 CONTROLLED TYPE 2 DIABETES MELLITUS WITHOUT COMPLICATION, WITHOUT LONG-TERM CURRENT USE OF INSULIN (HCC): Primary | ICD-10-CM

## 2018-04-10 DIAGNOSIS — I10 ESSENTIAL HYPERTENSION: ICD-10-CM

## 2018-04-10 PROBLEM — E66.01 SEVERE OBESITY (BMI 35.0-39.9) WITH COMORBIDITY (HCC): Status: ACTIVE | Noted: 2018-04-10

## 2018-04-10 LAB
ANION GAP SERPL CALC-SCNC: 8 MMOL/L (ref 5–15)
BASOPHILS # BLD: 0.1 K/UL (ref 0–0.1)
BASOPHILS NFR BLD: 1 % (ref 0–1)
BUN SERPL-MCNC: 14 MG/DL (ref 6–20)
BUN/CREAT SERPL: 12 (ref 12–20)
CALCIUM SERPL-MCNC: 9.1 MG/DL (ref 8.5–10.1)
CHLORIDE SERPL-SCNC: 99 MMOL/L (ref 97–108)
CHOLEST SERPL-MCNC: 199 MG/DL
CO2 SERPL-SCNC: 31 MMOL/L (ref 21–32)
CREAT SERPL-MCNC: 1.18 MG/DL (ref 0.7–1.3)
CREAT UR-MCNC: 139 MG/DL
DIFFERENTIAL METHOD BLD: ABNORMAL
EOSINOPHIL # BLD: 0.2 K/UL (ref 0–0.4)
EOSINOPHIL NFR BLD: 3 % (ref 0–7)
ERYTHROCYTE [DISTWIDTH] IN BLOOD BY AUTOMATED COUNT: 13.8 % (ref 11.5–14.5)
EST. AVERAGE GLUCOSE BLD GHB EST-MCNC: 183 MG/DL
GLUCOSE POC: NORMAL MG/DL
GLUCOSE SERPL-MCNC: 132 MG/DL (ref 65–100)
HBA1C MFR BLD: 8 % (ref 4.2–6.3)
HCT VFR BLD AUTO: 44.7 % (ref 36.6–50.3)
HDLC SERPL-MCNC: 50 MG/DL
HDLC SERPL: 4 {RATIO} (ref 0–5)
HGB BLD-MCNC: 15 G/DL (ref 12.1–17)
IMM GRANULOCYTES # BLD: 0 K/UL (ref 0–0.04)
IMM GRANULOCYTES NFR BLD AUTO: 0 % (ref 0–0.5)
LDLC SERPL CALC-MCNC: 120.6 MG/DL (ref 0–100)
LIPID PROFILE,FLP: ABNORMAL
LYMPHOCYTES # BLD: 3.6 K/UL (ref 0.8–3.5)
LYMPHOCYTES NFR BLD: 49 % (ref 12–49)
MCH RBC QN AUTO: 30 PG (ref 26–34)
MCHC RBC AUTO-ENTMCNC: 33.6 G/DL (ref 30–36.5)
MCV RBC AUTO: 89.4 FL (ref 80–99)
MICROALBUMIN UR-MCNC: 0.65 MG/DL
MICROALBUMIN/CREAT UR-RTO: 5 MG/G (ref 0–30)
MONOCYTES # BLD: 0.8 K/UL (ref 0–1)
MONOCYTES NFR BLD: 10 % (ref 5–13)
NEUTS SEG # BLD: 2.7 K/UL (ref 1.8–8)
NEUTS SEG NFR BLD: 37 % (ref 32–75)
NRBC # BLD: 0 K/UL (ref 0–0.01)
NRBC BLD-RTO: 0 PER 100 WBC
PLATELET # BLD AUTO: 348 K/UL (ref 150–400)
PMV BLD AUTO: 10 FL (ref 8.9–12.9)
POTASSIUM SERPL-SCNC: 3.7 MMOL/L (ref 3.5–5.1)
RBC # BLD AUTO: 5 M/UL (ref 4.1–5.7)
SODIUM SERPL-SCNC: 138 MMOL/L (ref 136–145)
TRIGL SERPL-MCNC: 142 MG/DL (ref ?–150)
VLDLC SERPL CALC-MCNC: 28.4 MG/DL
WBC # BLD AUTO: 7.3 K/UL (ref 4.1–11.1)

## 2018-04-10 PROCEDURE — 82570 ASSAY OF URINE CREATININE: CPT | Performed by: NURSE PRACTITIONER

## 2018-04-10 PROCEDURE — 85025 COMPLETE CBC W/AUTO DIFF WBC: CPT | Performed by: NURSE PRACTITIONER

## 2018-04-10 PROCEDURE — 83036 HEMOGLOBIN GLYCOSYLATED A1C: CPT | Performed by: NURSE PRACTITIONER

## 2018-04-10 PROCEDURE — 80048 BASIC METABOLIC PNL TOTAL CA: CPT | Performed by: NURSE PRACTITIONER

## 2018-04-10 PROCEDURE — 80061 LIPID PANEL: CPT | Performed by: NURSE PRACTITIONER

## 2018-04-10 PROCEDURE — 82043 UR ALBUMIN QUANTITATIVE: CPT | Performed by: NURSE PRACTITIONER

## 2018-04-10 RX ORDER — METOPROLOL TARTRATE 25 MG/1
TABLET, FILM COATED ORAL
Qty: 180 TAB | Refills: 0 | Status: SHIPPED | OUTPATIENT
Start: 2018-04-10 | End: 2018-07-16 | Stop reason: SDUPTHER

## 2018-04-10 RX ORDER — LOVASTATIN 20 MG/1
40 TABLET ORAL
Qty: 180 TAB | Refills: 1 | Status: ON HOLD | OUTPATIENT
Start: 2018-04-10 | End: 2018-11-08

## 2018-04-10 RX ORDER — AMLODIPINE BESYLATE 5 MG/1
5 TABLET ORAL DAILY
Qty: 30 TAB | Refills: 1 | Status: SHIPPED | OUTPATIENT
Start: 2018-04-10 | End: 2018-05-14 | Stop reason: SDUPTHER

## 2018-04-10 RX ORDER — METFORMIN HYDROCHLORIDE 500 MG/1
TABLET, EXTENDED RELEASE ORAL
Qty: 360 TAB | Refills: 0 | Status: SHIPPED | OUTPATIENT
Start: 2018-04-10 | End: 2018-07-16 | Stop reason: SDUPTHER

## 2018-04-10 RX ORDER — TRIAMTERENE/HYDROCHLOROTHIAZID 37.5-25 MG
TABLET ORAL
Qty: 90 TAB | Refills: 0 | Status: SHIPPED | OUTPATIENT
Start: 2018-04-10 | End: 2018-07-16 | Stop reason: SDUPTHER

## 2018-04-10 NOTE — MR AVS SNAPSHOT
[address] [place] [address] 
[phone] Patient: Abhishek Reese MRN: [id number] OKH:4/70/3031 Visit Information Date & Time Provider Department Dept. Phone Encounter #  
 4/10/2018 12:30 PM Tory Ruiz NP [facility] [address] [phone] [id number] Follow-up Instructions Return in about 5 weeks (around 5/14/2018) for hypertension/overbook LK at [facility] chronic care or next available st aug cc. Upcoming Health Maintenance Date Due Hepatitis C Screening 1965 EYE EXAM RETINAL OR DILATED Q1 1/17/1975 Pneumococcal 19-64 Medium Risk (1 of 1 - PPSV23) 1/17/1984 DTaP/Tdap/Td series (1 - Tdap) 1/17/1986 FOBT Q 1 YEAR AGE 50-75 1/17/2015 FOOT EXAM Q1 2/20/2018 MICROALBUMIN Q1 2/20/2018 LIPID PANEL Q1 4/4/2018 HEMOGLOBIN A1C Q6M 7/15/2018 Allergies as of 4/10/2018  Review Complete On: 4/10/2018 By: Tory Ruiz NP No Known Allergies Current Immunizations  Reviewed on 1/15/2018 Name Date Influenza Vaccine 12/16/2013, 10/15/2012 Influenza Vaccine (Quad) PF 1/15/2018, 12/1/2015, 12/16/2013 Not reviewed this visit You Were Diagnosed With   
  
 Codes Comments Essential hypertension    -  Primary ICD-10-CM: I10 
ICD-9-CM: 401.9 Encounter for screening     ICD-10-CM: Z13.9 ICD-9-CM: V82.9 Controlled type 2 diabetes mellitus without complication, without long-term current use of insulin ([facility]     ICD-10-CM: E11.9 ICD-9-CM: 250.00 High cholesterol     ICD-10-CM: E78.00 ICD-9-CM: 272.0 Vitals BP Pulse Temp Height(growth percentile) Weight(growth percentile) BMI  
 132/88 (BP 1 Location: Right arm) 67 98.3 °F (36.8 °C) (Oral) 5' 8.7\" (1.745 m) 251 lb (113.9 kg) 37.39 kg/m2 Smoking Status Never Smoker Vitals History BMI and BSA Data  Body Mass Index Body Surface Area  
 37.39 kg/m 2 2.35 m 2  
  
 Preferred Pharmacy Pharmacy Name Phone [address] Jina Oswald Wong [phone] Your Updated Medication List  
  
   
This list is accurate as of 4/10/18  2:25 PM.  Always use your most recent med list. amLODIPine 5 mg tablet Commonly known as:  Jessup Bars Take 1 Tab by mouth daily for 30 days. lovastatin 20 mg tablet Commonly known as:  MEVACOR Take 2 Tabs by mouth nightly. metFORMIN  mg tablet Commonly known as:  GLUCOPHAGE XR  
TAKE 2 TABLETS DAILY WITH BREAKFAST AND THEN 2 TABLETS DAILY WITH DINNER  
  
 metoprolol tartrate 25 mg tablet Commonly known as:  LOPRESSOR  
TAKE ONE TABLET BY MOUTH TWICE DAILY FOR HYPERTENSION  
  
 triamterene-hydroCHLOROthiazide 37.5-25 mg per tablet Commonly known as:  Eulice Thong TAKE ONE TABLET BY MOUTH ONE TIME DAILY Prescriptions Sent to Pharmacy Refills  
 amLODIPine (NORVASC) 5 mg tablet 1 Sig: Take 1 Tab by mouth daily for 30 days. Class: Normal  
 Pharmacy: [place] [address] Ph #: [phone] Route: Oral  
 metoprolol tartrate (LOPRESSOR) 25 mg tablet 0 Sig: TAKE ONE TABLET BY MOUTH TWICE DAILY FOR HYPERTENSION Class: Normal  
 Pharmacy: [facility] [address] Ph #: [phone]  
 metFORMIN ER (GLUCOPHAGE XR) 500 mg tablet 0 Sig: TAKE 2 TABLETS DAILY WITH BREAKFAST AND THEN 2 TABLETS DAILY WITH DINNER Class: Normal  
 Pharmacy: [facility] [id number] [facility] - [address] [facility] Ph #: [phone]  
 triamterene-hydroCHLOROthiazide (MAXZIDE) 37.5-25 mg per tablet 0 Sig: TAKE ONE TABLET BY MOUTH ONE TIME DAILY Class: Normal  
 Pharmacy: [facility] [id number] [facility] - [address] [facility] [address] Ph #: [phone]  
 lovastatin (MEVACOR) 20 mg tablet 1 Sig: Take 2 Tabs by mouth nightly.   
 Class: Normal  
 Pharmacy: [address] [place] [address]  #: [phone] Route: Oral  
  
We Performed the Following AMB POC GLUCOSE BLOOD, BY GLUCOSE MONITORING DEVICE [52689 CPT(R)] CBC WITH AUTOMATED DIFF [82561 CPT(R)] HEMOGLOBIN A1C WITH EAG [11506 CPT(R)] HEMOGLOBIN A1C WITH EAG [98043 CPT(R)] LIPID PANEL [37657 CPT(R)] METABOLIC PANEL, BASIC [94455 CPT(R)] MICROALBUMIN, UR, RAND W/ MICROALB/CREAT RATIO [id number] CPT(R)] Follow-up Instructions Return in about 5 weeks (around 5/14/2018) for hypertension/overbook LK at [facility] or next available st aug cc. Patient Instructions Look at [facility] Introducing [facility] [place] [facility] SERVICES! Joyce Em introduces [employer] patient portal. Now you can access parts of your medical record, email your doctor's office, and request medication refills online. 1. In your internet browser, go to [email or web address] 2. Click on the First Time User? Click Here link in the Sign In box. You will see the New Member Sign Up page. 3. Enter your [employer] Access Code exactly as it appears below. You will not need to use this code after youve completed the sign-up process. If you do not sign up before the expiration date, you must request a new code. · [employer] Access Code: [email or web address] Expires: 7/9/2018 12:44 PM 
 
4. Enter the last four digits of your Social Security Number (xxxx) and Date of Birth (mm/dd/yyyy) as indicated and click Submit. You will be taken to the next sign-up page. 5. Create a iLinkt ID. This will be your [employer]ia login ID and cannot be changed, so think of one that is secure and easy to remember. 6. Create a [employer] password. You can change your password at any time. 7. Enter your Password Reset Question and Answer. This can be used at a later time if you forget your password. 8. Enter your e-mail address. You will receive e-mail notification when new information is available in [address] 9. Click Sign Up. You can now view and download portions of your medical record. 10. Click the Download Summary menu link to download a portable copy of your medical information. If you have questions, please visit the Frequently Asked Questions section of the Real[employer] website. Remember, [employer] is NOT to be used for urgent needs. For medical emergencies, dial 911. Now available from your iPhone and Android! Please provide this summary of care documentation to your next provider. If you have any questions after today's visit, please call [phone].

## 2018-04-10 NOTE — PROGRESS NOTES
Assessment/Plan:       ICD-10-CM ICD-9-CM    1. Uncontrolled type 2 diabetes mellitus without complication, without long-term current use of insulin (HCC) E11.65 250.02 AMB POC GLUCOSE BLOOD, BY GLUCOSE MONITORING DEVICE      HEMOGLOBIN A1C WITH EAG      HEMOGLOBIN A1C WITH EAG      MICROALBUMIN, UR, RAND W/ MICROALB/CREAT RATIO      CBC WITH AUTOMATED DIFF      metFORMIN ER (GLUCOPHAGE XR) 500 mg tablet      glipiZIDE (GLUCOTROL) 5 mg tablet   2. Essential hypertension Z17 794.5 METABOLIC PANEL, BASIC      amLODIPine (NORVASC) 5 mg tablet      metoprolol tartrate (LOPRESSOR) 25 mg tablet      triamterene-hydroCHLOROthiazide (MAXZIDE) 37.5-25 mg per tablet   3. Other hyperlipidemia E78.4 272.4 LIPID PANEL      lovastatin (MEVACOR) 20 mg tablet   Changes made: added amlodipine 5mg (can try at night), added glipizide 5mg 30 minutes before dinner. Keep up the activity, keep reading those labels and look at the \"total carbohydrates\", keep enjoying those salads. Follow up 5/14/18 HTN, [facility] (to overbook). Pharmacy:  [address] [address]  Phone: [phone] Fax: [phone]      [facility]- [address]   Subjective:     Chief Complaint   Patient presents with    Follow-up     medication refills    Low Fernandez is a 48 y.o. PATIENT REFUSED male who speaks [place]. Diabetes   Brought in medications? no  Last took medications: today  Last ate: today. Chicken, not much red meat. Salads - sallie, chopped spinach, green pepper, carrot, red onions, white of the eggs, grilled chicken. Likes caesar salad. Meryle Ethel dressing - he likes it but HFCS so he stopped it.   Has been looking at nutrition labels at the \"added sugars\" (education provided: best to look at total carbohydrates), reports he likes vinaigrettes (education: Stacy Shark are [facility] and high in animal fat and calories; vinaigrettes use vegetable oils and the fat content is healthier - do watch how many carbs). Working: did not discuss today  Physical Activity: Walking, mostly, and jogging up the hill. Minimum 35 min 3 times a week, now gardening. Loves going to the park really near his house. He walks over, walks his dog, and now is playing tennis bouncing the ball against a wall, and also gardening over there. Used to ride his bike over there, not anymore. Measuring glucoses? no  Social History: He reports that he has never smoked. He has never used smokeless tobacco. He reports that he does not drink alcohol or use illicit drugs. Family History: His family history is not on file. Past Surgical History:  has no past surgical history on file. Outpatient Medications Prior to Visit   Medication Sig Dispense Refill    metoprolol tartrate (LOPRESSOR) 25 mg tablet TAKE ONE TABLET BY MOUTH TWICE DAILY FOR HYPERTENSION 180 Tab 0    metFORMIN ER (GLUCOPHAGE XR) 500 mg tablet TAKE 2 TABLETS DAILY WITH BREAKFAST AND THEN 2 TABLETS DAILY WITH DINNER 360 Tab 0    triamterene-hydroCHLOROthiazide (MAXZIDE) 37.5-25 mg per tablet TAKE ONE TABLET BY MOUTH ONE TIME DAILY 90 Tab 0    lovastatin (MEVACOR) 20 mg tablet Take 2 Tabs by mouth nightly. 180 Tab 1     No facility-administered medications prior to visit. Taking medications as prescribed? yes   Any related problems to discuss? no  Objective:     Vitals:    04/10/18 1314 04/10/18 1317   BP: 144/90 132/88   Pulse: 68 67   Temp: 98.3 °F (36.8 °C)    TempSrc: Oral    Weight: 251 lb (113.9 kg)    Height: 5' 8.7\" (1.745 m)     No LMP for male patient. Results for orders placed or performed during the hospital encounter of 04/10/18   CBC WITH AUTOMATED DIFF   Result Value Ref Range    WBC 7.3 4.1 - 11.1 K/uL    RBC 5.00 4. 10 - 5.70 M/uL    HGB 15.0 12.1 - 17.0 g/dL    HCT 44.7 36.6 - 50.3 %    MCV 89.4 80.0 - 99.0 FL    MCH 30.0 26.0 - 34.0 PG    MCHC 33.6 30.0 - 36.5 g/dL    RDW 13.8 11.5 - 14.5 % PLATELET 004 319 - 704 K/uL    MPV 10.0 8.9 - 12.9 FL    NRBC 0.0 0  WBC    ABSOLUTE NRBC 0.00 0.00 - 0.01 K/uL    NEUTROPHILS 37 32 - 75 %    LYMPHOCYTES 49 12 - 49 %    MONOCYTES 10 5 - 13 %    EOSINOPHILS 3 0 - 7 %    BASOPHILS 1 0 - 1 %    IMMATURE GRANULOCYTES 0 0.0 - 0.5 %    ABS. NEUTROPHILS 2.7 1.8 - 8.0 K/UL    ABS. LYMPHOCYTES 3.6 (H) 0.8 - 3.5 K/UL    ABS. MONOCYTES 0.8 0.0 - 1.0 K/UL    ABS. EOSINOPHILS 0.2 0.0 - 0.4 K/UL    ABS. BASOPHILS 0.1 0.0 - 0.1 K/UL    ABS. IMM. GRANS. 0.0 0.00 - 0.04 K/UL    DF AUTOMATED     METABOLIC PANEL, BASIC   Result Value Ref Range    Sodium 138 136 - 145 mmol/L    Potassium 3.7 3.5 - 5.1 mmol/L    Chloride 99 97 - 108 mmol/L    CO2 31 21 - 32 mmol/L    Anion gap 8 5 - 15 mmol/L    Glucose 132 (H) 65 - 100 mg/dL    BUN 14 6 - 20 MG/DL    Creatinine 1.18 0.70 - 1.30 MG/DL    BUN/Creatinine ratio 12 12 - 20      GFR est AA >60 >60 ml/min/1.73m2    GFR est non-AA >60 >60 ml/min/1.73m2    Calcium 9.1 8.5 - 10.1 MG/DL   LIPID PANEL   Result Value Ref Range    LIPID PROFILE          Cholesterol, total 199 <200 MG/DL    Triglyceride 142 <150 MG/DL    HDL Cholesterol 50 MG/DL    LDL, calculated 120.6 (H) 0 - 100 MG/DL    VLDL, calculated 28.4 MG/DL    CHOL/HDL Ratio 4.0 0 - 5.0     HEMOGLOBIN A1C WITH EAG   Result Value Ref Range    Hemoglobin A1c 8.0 (H) 4.2 - 6.3 %    Est. average glucose 183 mg/dL   MICROALBUMIN, UR, RAND   Result Value Ref Range    Microalbumin,urine random 0.65 MG/DL    Creatinine, urine 139.00 mg/dL    Microalbumin/Creat ratio (mg/g creat) 5 0 - 30 mg/g   Results for orders placed or performed in visit on 04/10/18   AMB POC GLUCOSE BLOOD, BY GLUCOSE MONITORING DEVICE   Result Value Ref Range    Glucose POC nf  131 mg/dL   clothes are a little looser.         Wt Readings from Last 2 Encounters:   04/10/18 251 lb (113.9 kg)   01/15/18 251 lb (113.9 kg)    Weight unchanged; however, reports his clothes are looser and he has more stamina and muscle strength with exercise. Hemoglobin A1c   Date Value Ref Range Status   04/10/2018 8.0 (H) 4.2 - 6.3 % Final   01/15/2018 7.5 (H) 4.2 - 6.3 % Final    A1C has increased. Lab Results   Component Value Date/Time    Microalbumin/Creat ratio (mg/g creat) 5 04/10/2018 01:42 PM    Microalbumin,urine random 0.65 04/10/2018 01:42 PM    Creatinine 1.18 04/10/2018 01:42 PM      Lab Results   Component Value Date/Time    GFR est AA >60 04/10/2018 01:42 PM    GFR est non-AA >60 04/10/2018 01:42 PM       Lab Results   Component Value Date/Time    Cholesterol, total 199 04/10/2018 01:42 PM    HDL Cholesterol 50 04/10/2018 01:42 PM    LDL, calculated 120.6 (H) 04/10/2018 01:42 PM    Triglyceride 142 04/10/2018 01:42 PM    CHOL/HDL Ratio 4.0 04/10/2018 01:42 PM      Lab Results   Component Value Date/Time    ALT (SGPT) 51 02/20/2017 10:54 AM    AST (SGOT) 31 02/20/2017 10:54 AM    Alk. phosphatase 102 02/20/2017 10:54 AM    Bilirubin, total 0.4 02/20/2017 10:54 AM     Constitutional: He appears well-developed. Eyes: EOM are normal. Pupils are equal, round, and reactive to light. Neck: Neck supple. No thyromegaly present. Cardiovascular: Normal rate, regular rhythm, normal heart sounds and intact distal pulses. No murmur heard. Pulmonary/Chest: Effort normal and breath sounds normal.   Musculoskeletal: He exhibits no edema. No ulcers of the lower extremities. Assessment/Plan:   Diagnoses and all orders for this visit:    1.  Uncontrolled type 2 diabetes mellitus without complication, without long-term current use of insulin (HCC)  -     AMB POC GLUCOSE BLOOD, BY GLUCOSE MONITORING DEVICE  -     HEMOGLOBIN A1C WITH EAG  -     HEMOGLOBIN A1C WITH EAG  -     MICROALBUMIN, UR, RAND W/ MICROALB/CREAT RATIO  -     CBC WITH AUTOMATED DIFF  -     metFORMIN ER (GLUCOPHAGE XR) 500 mg tablet; TAKE 2 TABLETS DAILY WITH BREAKFAST AND THEN 2 TABLETS DAILY WITH DINNER  -     glipiZIDE (GLUCOTROL) 5 mg tablet; 1 po qday 30 min ac dinner (A1c=8.0)    2. Essential hypertension  -     METABOLIC PANEL, BASIC  -     amLODIPine (NORVASC) 5 mg tablet; Take 1 Tab by mouth daily for 30 days. -     metoprolol tartrate (LOPRESSOR) 25 mg tablet; TAKE ONE TABLET BY MOUTH TWICE DAILY FOR HYPERTENSION  -     triamterene-hydroCHLOROthiazide (MAXZIDE) 37.5-25 mg per tablet; TAKE ONE TABLET BY MOUTH ONE TIME DAILY    3. Other hyperlipidemia  -     LIPID PANEL  -     lovastatin (MEVACOR) 20 mg tablet; Take 2 Tabs by mouth nightly. Diabetes Mellitus type 2, under poor control. Blood pressure under Fair control. Greater than 50% of this 25 minute visit was spent in face-to-face counseling/coordination of care regarding diabetes management. Follow-up Disposition:  Return in about 5 weeks (around 5/14/2018) for hypertension/overbook LK at [facility] chronic care or next available st aug cc. Check back orders - I have ordered labs, please draw before I see him, I'm running behind. Please print out goodrx coupon for amlodipine 5mg #30. Add to current medications once daily. Can try at night. Recheck 4 wks LK preferred chronic care appt - can he come to [facility] on Monday, May 14? Joce Franklin, DNP, FNP-BC, BC-ADM  Rafal Maurice expressed understanding of this plan.

## 2018-04-11 ENCOUNTER — TELEPHONE (OUTPATIENT)
Dept: FAMILY MEDICINE CLINIC | Age: 53
End: 2018-04-11

## 2018-04-11 RX ORDER — GLIPIZIDE 5 MG/1
TABLET ORAL
Qty: 30 TAB | Refills: 1 | Status: SHIPPED | OUTPATIENT
Start: 2018-04-11 | End: 2018-05-14 | Stop reason: SDUPTHER

## 2018-04-11 NOTE — PROGRESS NOTES
Your A1c increased to 8 (from 7.5). Please add a glipizide 5mg 30 minutes before dinner. No other changes.

## 2018-04-11 NOTE — PROGRESS NOTES
I called pt and left message for him to call ELLEN. His A1c has increased from 7.5 to 8. Add glipizide 5mg 30 minutes before dinner, no other changes. I have sent it in to your pharmacy.

## 2018-04-13 NOTE — TELEPHONE ENCOUNTER
Return call made to the patient. We reviewed the provider's recent lab result note about his increased A1C and the new prescription for Glipizide sent in to his [facility] pharmacy. The patient expressed understanding and confirmed his May follow-up appointment.  Nara Hollingsworth RN

## 2018-05-14 ENCOUNTER — OFFICE VISIT (OUTPATIENT)
Dept: FAMILY MEDICINE CLINIC | Age: 53
End: 2018-05-14

## 2018-05-14 VITALS
WEIGHT: 249 LBS | HEART RATE: 69 BPM | SYSTOLIC BLOOD PRESSURE: 125 MMHG | TEMPERATURE: 98.3 F | DIASTOLIC BLOOD PRESSURE: 77 MMHG | BODY MASS INDEX: 37.09 KG/M2

## 2018-05-14 DIAGNOSIS — Z13.9 ENCOUNTER FOR SCREENING: ICD-10-CM

## 2018-05-14 DIAGNOSIS — I10 ESSENTIAL HYPERTENSION: ICD-10-CM

## 2018-05-14 DIAGNOSIS — E11.9 CONTROLLED TYPE 2 DIABETES MELLITUS WITHOUT COMPLICATION, WITHOUT LONG-TERM CURRENT USE OF INSULIN (HCC): Primary | ICD-10-CM

## 2018-05-14 DIAGNOSIS — L02.92 BOIL: ICD-10-CM

## 2018-05-14 LAB — GLUCOSE POC: NORMAL MG/DL

## 2018-05-14 RX ORDER — AMLODIPINE BESYLATE 5 MG/1
5 TABLET ORAL DAILY
Qty: 90 TAB | Refills: 1 | Status: SHIPPED | OUTPATIENT
Start: 2018-05-14 | End: 2018-12-04 | Stop reason: SDUPTHER

## 2018-05-14 RX ORDER — CEPHALEXIN 500 MG/1
500 CAPSULE ORAL 4 TIMES DAILY
Qty: 40 CAP | Refills: 0 | Status: SHIPPED | OUTPATIENT
Start: 2018-05-14 | End: 2018-05-24

## 2018-05-14 RX ORDER — GLIPIZIDE 5 MG/1
TABLET ORAL
Qty: 90 TAB | Refills: 1 | Status: SHIPPED | OUTPATIENT
Start: 2018-05-14 | End: 2018-12-04 | Stop reason: SDUPTHER

## 2018-05-14 NOTE — PROGRESS NOTES
Assessment/Plan:       ICD-10-CM ICD-9-CM    1. Controlled type 2 diabetes mellitus without complication, without long-term current use of insulin (HCC) E11.9 250.00 AMB POC GLUCOSE BLOOD, BY GLUCOSE MONITORING DEVICE      glipiZIDE (GLUCOTROL) 5 mg tablet   2. Encounter for screening Z13.9 V82.9    3. Essential hypertension I10 401.9 amLODIPine (NORVASC) 5 mg tablet   4. Boil L02.92 680.9 cephALEXin (KEFLEX) 500 mg capsule    Greater than 50% of this 25 minute visit was spent in face-to-face counseling/coordination of care regarding diabetes management. Changes made: none  Follow-up Disposition:  Return in about 2 months (around 7/14/2018) for diabetes. 99 to 138 or 137. 120s most common. Not constipation but not as loose; glipizide - getting full. [facility] [address] [address]  Phone: [phone] Fax: [phone] [place] [facility] [place] [address] [address] [place] [address]  Phone: [phone] Fax: [phone]      [facility]  Subjective:     Chief Complaint   Patient presents with    Diabetes     f/u    Babara Favre is a 48 y.o. PATIENT REFUSED male who speaks [place]. Returns for a short interval follow up due to medication additions including amlodipine 5mg and glipizide 5mg. He is feeling well on these. Denies diarrhea or constipation, states that his stool was previously looser before taking glipizide and only taking metformin for diabetes. Also reports he feels full more easily at dinner since taking glipizide prior to dinner. Diabetes   Brought in medications? no   Last took medications: today  Last ate: today   Physical Activity? gardening  Measuring glucoses? yes  Social History: He reports that he has never smoked. He has never used smokeless tobacco. He reports that he does not drink alcohol or use illicit drugs.    Family History: His family history is not on file. Past Surgical History:  has no past surgical history on file. Outpatient Medications Prior to Visit   Medication Sig Dispense Refill    glipiZIDE (GLUCOTROL) 5 mg tablet 1 po qday 30 min ac dinner (A1c=8.0) 30 Tab 1    metoprolol tartrate (LOPRESSOR) 25 mg tablet TAKE ONE TABLET BY MOUTH TWICE DAILY FOR HYPERTENSION 180 Tab 0    metFORMIN ER (GLUCOPHAGE XR) 500 mg tablet TAKE 2 TABLETS DAILY WITH BREAKFAST AND THEN 2 TABLETS DAILY WITH DINNER 360 Tab 0    triamterene-hydroCHLOROthiazide (MAXZIDE) 37.5-25 mg per tablet TAKE ONE TABLET BY MOUTH ONE TIME DAILY 90 Tab 0    lovastatin (MEVACOR) 20 mg tablet Take 2 Tabs by mouth nightly. 180 Tab 1     No facility-administered medications prior to visit. Taking medications as prescribed? yes   Any problems to discuss? yes  Objective:     Vitals:    05/14/18 1304   BP: 125/77   Pulse: 69   Temp: 98.3 °F (36.8 °C)   TempSrc: Oral   Weight: 249 lb (112.9 kg)    No LMP for male patient. Results for orders placed or performed in visit on 05/14/18   AMB POC GLUCOSE BLOOD, BY GLUCOSE MONITORING DEVICE   Result Value Ref Range    Glucose  nf mg/dL      Wt Readings from Last 2 Encounters:   05/14/18 249 lb (112.9 kg)   04/10/18 251 lb (113.9 kg)    Weight decreased;    Hemoglobin A1c   Date Value Ref Range Status   04/10/2018 8.0 (H) 4.2 - 6.3 % Final   01/15/2018 7.5 (H) 4.2 - 6.3 % Final    A1C increased;   Lab Results   Component Value Date/Time    Microalbumin/Creat ratio (mg/g creat) 5 04/10/2018 01:42 PM    Microalbumin,urine random 0.65 04/10/2018 01:42 PM    Creatinine 1.18 04/10/2018 01:42 PM      Lab Results   Component Value Date/Time    GFR est AA >60 04/10/2018 01:42 PM    GFR est non-AA >60 04/10/2018 01:42 PM       Lab Results   Component Value Date/Time    Cholesterol, total 199 04/10/2018 01:42 PM    HDL Cholesterol 50 04/10/2018 01:42 PM    LDL, calculated 120.6 (H) 04/10/2018 01:42 PM    Triglyceride 142 04/10/2018 01:42 PM CHOL/HDL Ratio 4.0 04/10/2018 01:42 PM      Lab Results   Component Value Date/Time    ALT (SGPT) 51 02/20/2017 10:54 AM    AST (SGOT) 31 02/20/2017 10:54 AM    Alk. phosphatase 102 02/20/2017 10:54 AM    Bilirubin, total 0.4 02/20/2017 10:54 AM     Constitutional: He appears well-developed. Eyes: EOM are normal. Pupils are equal, round, and reactive to light. Neck: Neck supple. No thyromegaly present. Cardiovascular: Normal rate, regular rhythm, normal heart sounds and intact distal pulses. No murmur heard. Pulmonary/Chest: Effort normal and breath sounds normal.   Musculoskeletal: He exhibits no edema. No ulcers of the lower extremities. Integumentary: right upper back abscess. Assessment/Plan:   Diagnoses and all orders for this visit:    1. Controlled type 2 diabetes mellitus without complication, without long-term current use of insulin ([facility])  -     AMB POC GLUCOSE BLOOD, BY GLUCOSE MONITORING DEVICE  -     glipiZIDE (GLUCOTROL) 5 mg tablet; 1 po qday 30 min ac dinner    2. Encounter for screening    3. Essential hypertension  -     amLODIPine (NORVASC) 5 mg tablet; Take 1 Tab by mouth daily. 4. Boil  -     cephALEXin (KEFLEX) 500 mg capsule; Take 1 Cap by mouth four (4) times daily for 10 days. right upper back abscess. Diabetes Mellitus type 2, under Good control. Blood pressure under Good control. Greater than 50% of this 25 minute visit was spent in face-to-face counseling/coordination of care regarding diabetes management. Follow-up Disposition:  Return in about 2 months (around 7/14/2018) for diabetes. Cody Mcmullen, DNP, FNP-BC, BC-ADM  Luis Carlosaubrey Candace expressed understanding of this plan.    Abscess upper back

## 2018-05-14 NOTE — PROGRESS NOTES
Coordination of Care  1. Have you been to the ER, urgent care clinic since your last visit? Hospitalized since your last visit? No    2. Have you seen or consulted any other health care providers outside of the [address] [place] [address] since your last visit? Include any pap smears or colon screening. No    Does the patient need refills? N/A    Learning Assessment Complete?  yes  Depression Screening complete in the past 12 months? yes    Results for orders placed or performed in visit on 05/14/18   AMB POC GLUCOSE BLOOD, BY GLUCOSE MONITORING DEVICE   Result Value Ref Range    Glucose  nf mg/dL

## 2018-07-10 ENCOUNTER — HOSPITAL ENCOUNTER (OUTPATIENT)
Dept: LAB | Age: 53
Discharge: HOME | End: 2018-07-10

## 2018-07-10 ENCOUNTER — OFFICE VISIT (OUTPATIENT)
Dept: FAMILY MEDICINE CLINIC | Age: 53
End: 2018-07-10

## 2018-07-10 VITALS
DIASTOLIC BLOOD PRESSURE: 85 MMHG | SYSTOLIC BLOOD PRESSURE: 127 MMHG | HEIGHT: 69 IN | WEIGHT: 243 LBS | BODY MASS INDEX: 35.99 KG/M2 | HEART RATE: 74 BPM | TEMPERATURE: 98.4 F

## 2018-07-10 DIAGNOSIS — E11.65 TYPE 2 DIABETES MELLITUS WITH HYPERGLYCEMIA, WITHOUT LONG-TERM CURRENT USE OF INSULIN (HCC): Primary | ICD-10-CM

## 2018-07-10 DIAGNOSIS — E11.65 TYPE 2 DIABETES MELLITUS WITH HYPERGLYCEMIA, WITHOUT LONG-TERM CURRENT USE OF INSULIN (HCC): ICD-10-CM

## 2018-07-10 LAB
EST. AVERAGE GLUCOSE BLD GHB EST-MCNC: 151 MG/DL
GLUCOSE POC: NORMAL MG/DL
HBA1C MFR BLD: 6.9 % (ref 4.2–6.3)

## 2018-07-10 PROCEDURE — 83036 HEMOGLOBIN GLYCOSYLATED A1C: CPT | Performed by: NURSE PRACTITIONER

## 2018-07-11 NOTE — PROGRESS NOTES
Has follow up 7/16/18 to discuss. A1C has improved - will want to discuss what he has done differently. To evaluate vital signs, weight, activity level, eating patterns.   Discuss ophthalmology exam.

## 2018-07-14 DIAGNOSIS — I10 ESSENTIAL HYPERTENSION: ICD-10-CM

## 2018-07-14 RX ORDER — TRIAMTERENE/HYDROCHLOROTHIAZID 37.5-25 MG
TABLET ORAL
Qty: 90 TAB | Refills: 0 | Status: CANCELLED | OUTPATIENT
Start: 2018-07-14

## 2018-07-16 ENCOUNTER — OFFICE VISIT (OUTPATIENT)
Dept: FAMILY MEDICINE CLINIC | Age: 53
End: 2018-07-16

## 2018-07-16 VITALS
HEIGHT: 69 IN | TEMPERATURE: 97.8 F | DIASTOLIC BLOOD PRESSURE: 81 MMHG | BODY MASS INDEX: 35.98 KG/M2 | WEIGHT: 242.95 LBS | SYSTOLIC BLOOD PRESSURE: 137 MMHG | HEART RATE: 73 BPM

## 2018-07-16 DIAGNOSIS — E11.9 CONTROLLED TYPE 2 DIABETES MELLITUS WITHOUT COMPLICATION, WITHOUT LONG-TERM CURRENT USE OF INSULIN (HCC): Primary | ICD-10-CM

## 2018-07-16 DIAGNOSIS — I10 ESSENTIAL HYPERTENSION: ICD-10-CM

## 2018-07-16 RX ORDER — TRIAMTERENE/HYDROCHLOROTHIAZID 37.5-25 MG
TABLET ORAL
Qty: 90 TAB | Refills: 1 | Status: ON HOLD | OUTPATIENT
Start: 2018-07-16 | End: 2018-11-18 | Stop reason: SDUPTHER

## 2018-07-16 RX ORDER — METOPROLOL TARTRATE 25 MG/1
TABLET, FILM COATED ORAL
Qty: 180 TAB | Refills: 1 | Status: SHIPPED | OUTPATIENT
Start: 2018-07-16 | End: 2018-12-04 | Stop reason: SDUPTHER

## 2018-07-16 RX ORDER — METFORMIN HYDROCHLORIDE 500 MG/1
TABLET, EXTENDED RELEASE ORAL
Qty: 360 TAB | Refills: 1 | Status: ON HOLD | OUTPATIENT
Start: 2018-07-16 | End: 2018-11-18 | Stop reason: SDUPTHER

## 2018-07-16 NOTE — PROGRESS NOTES
Coordination of Care  1. Have you been to the ER, urgent care clinic since your last visit? Hospitalized since your last visit? No    2. Have you seen or consulted any other health care providers outside of the [facility] since your last visit? Include any pap smears or colon screening. No    Does the patient need refills? YES    Learning Assessment Complete?  yes  Depression Screening complete in the past 12 months? yes

## 2018-07-16 NOTE — PATIENT INSTRUCTIONS
Lab Results   Component Value Date/Time    Hemoglobin A1c 6.9 (H) 07/10/2018 01:45 PM    Hemoglobin A1c 8.0 (H) 04/10/2018 01:42 PM    Hemoglobin A1c 7.5 (H) 01/15/2018 11:38 AM

## 2018-07-16 NOTE — PROGRESS NOTES
Assessment/Plan:       ICD-10-CM ICD-9-CM    1. Controlled type 2 diabetes mellitus without complication, without long-term current use of insulin (HCC) E11.9 250.00 metFORMIN ER (GLUCOPHAGE XR) 500 mg tablet   2. Essential hypertension I10 401.9 metoprolol tartrate (LOPRESSOR) 25 mg tablet      triamterene-hydroCHLOROthiazide (MAXZIDE) 37.5-25 mg per tablet      Follow-up Disposition:  Return in about 3 months (around 10/16/2018) for may have appointment in 3 months; given 6 months of medication. [facility] [address], [place] [address]  Phone: [phone] Fax: [phone]    [facility] [address] [address]  Phone: [phone] Fax: [phone]      [facility]  Subjective:     Chief Complaint   Patient presents with    Diabetes     f/u and med refill    Paralee Forth is a 48 y.o. PATIENT REFUSED male who speaks [place].  used? no   Diabetes   Brought in medications? yes   Last took medications: today  Last ate: today   Working: yes   Physical Activity? Yes, more gardening  Measuring glucoses? no  Social History: He reports that he has never smoked. He has never used smokeless tobacco. He reports that he does not drink alcohol or use illicit drugs. Family History: His family history is not on file. ROS:   No increased frequency of urination, no increased thirst; no chest pain, dyspnea or TIA's; no numbness, tingling or pain in extremities; no reports of hypoglycemia.    Medications:    Current Outpatient Prescriptions:     metoprolol tartrate (LOPRESSOR) 25 mg tablet, TAKE ONE TABLET BY MOUTH TWICE DAILY FOR HYPERTENSION, Disp: 180 Tab, Rfl: 1    metFORMIN ER (GLUCOPHAGE XR) 500 mg tablet, TAKE 2 TABLETS DAILY WITH BREAKFAST AND THEN 2 TABLETS DAILY WITH DINNER, Disp: 360 Tab, Rfl: 1    triamterene-hydroCHLOROthiazide (MAXZIDE) 37.5-25 mg per tablet, TAKE ONE TABLET BY MOUTH ONE TIME DAILY, Disp: 90 Tab, Rfl: 1    amLODIPine (NORVASC) 5 mg tablet, Take 1 Tab by mouth daily. , Disp: 90 Tab, Rfl: 1    glipiZIDE (GLUCOTROL) 5 mg tablet, 1 po qday 30 min ac dinner, Disp: 90 Tab, Rfl: 1    lovastatin (MEVACOR) 20 mg tablet, Take 2 Tabs by mouth nightly., Disp: 180 Tab, Rfl: 1  Taking medications as prescribed? yes   Any problems to discuss? No; he did say that he has noted an improvement in his glucose since adding the glipizide 5mg before dinner. Objective:     Vitals:    07/16/18 1141   BP: 137/81   Pulse: 73   Temp: 97.8 °F (36.6 °C)   TempSrc: Oral   Weight: 242 lb 15.2 oz (110.2 kg)   Height: 5' 9.05\" (1.754 m)    No LMP for male patient. No results found for any visits on 07/16/18.  from 7/10/18  Weight decreased; Wt Readings from Last 3 Encounters:   07/16/18 242 lb 15.2 oz (110.2 kg)   07/10/18 243 lb (110.2 kg)   05/14/18 249 lb (112.9 kg)     Hemoglobin A1c   Date Value Ref Range Status   07/10/2018 6.9 (H) 4.2 - 6.3 % Final   04/10/2018 8.0 (H) 4.2 - 6.3 % Final   A1C decreased; A lot more work in the garden and walking. More time. 2-3 hours or whatever it takes for his garden. Lab Results   Component Value Date/Time    Microalbumin/Creat ratio (mg/g creat) 5 04/10/2018 01:42 PM    Microalbumin,urine random 0.65 04/10/2018 01:42 PM    Creatinine 1.18 04/10/2018 01:42 PM      Lab Results   Component Value Date/Time    GFR est AA >60 04/10/2018 01:42 PM    GFR est non-AA >60 04/10/2018 01:42 PM       Lab Results   Component Value Date/Time    Cholesterol, total 199 04/10/2018 01:42 PM    HDL Cholesterol 50 04/10/2018 01:42 PM    LDL, calculated 120.6 (H) 04/10/2018 01:42 PM    Triglyceride 142 04/10/2018 01:42 PM    CHOL/HDL Ratio 4.0 04/10/2018 01:42 PM      Lab Results   Component Value Date/Time    ALT (SGPT) 51 02/20/2017 10:54 AM    AST (SGOT) 31 02/20/2017 10:54 AM    Alk.  phosphatase 102 02/20/2017 10:54 AM    Bilirubin, total 0.4 02/20/2017 10:54 AM     Constitutional: He appears well-developed. Eyes: EOM are normal. Pupils are equal, round, and reactive to light. Neck: Neck supple. No thyromegaly present. Cardiovascular: Normal rate, regular rhythm, normal heart sounds and intact distal pulses. No murmur heard. Pulmonary/Chest: Effort normal and breath sounds normal.   Musculoskeletal: He exhibits no edema. No ulcers of the lower extremities. Assessment/Plan:   Diagnoses and all orders for this visit:    1. Controlled type 2 diabetes mellitus without complication, without long-term current use of insulin (HCC)  -     metFORMIN ER (GLUCOPHAGE XR) 500 mg tablet; TAKE 2 TABLETS DAILY WITH BREAKFAST AND THEN 2 TABLETS DAILY WITH DINNER    2. Essential hypertension  -     metoprolol tartrate (LOPRESSOR) 25 mg tablet; TAKE ONE TABLET BY MOUTH TWICE DAILY FOR HYPERTENSION  -     triamterene-hydroCHLOROthiazide (MAXZIDE) 37.5-25 mg per tablet; TAKE ONE TABLET BY MOUTH ONE TIME DAILY    Diabetes Mellitus type 2, under Good control. Blood pressure under Good control. Greater than 50% of this 25 minute visit was spent in face-to-face counseling/coordination of care regarding diabetes management. Follow-up Disposition:  Return in about 3 months (around 10/16/2018) for may have appointment in 3 months; given 6 months of medication. Tending to his garden, giving it whatever time it has required to keep it healthy because he enjoys it so much;  up to 3 hours a day, 3-4 times a week. He will do more hours on days when it is not raining to catch up for the days it does rain and he is not working. He commented that someone walking by asked him why he spent so much time in the garden when he could go to the grocery store and buy the food there. It was difficult to put into words what the meaning of the gardening brought for him, but he is beginning to recognize how pleasant an experience it is for him.   He is also pleased that all this time tending to the garden has resulted in better health. Beckie Austin, DNP, FNP-BC, BC-ADM  George Cheung expressed understanding of this plan.

## 2018-07-18 ENCOUNTER — DOCUMENTATION ONLY (OUTPATIENT)
Dept: FAMILY MEDICINE CLINIC | Age: 53
End: 2018-07-18

## 2018-07-18 NOTE — PROGRESS NOTES
Patient said pharmacy didn't have prescriptions. I verified they had been sent to [facility] on [address]. He said he would call them again.     Karlyn Meckel April

## 2018-07-26 ENCOUNTER — TELEPHONE (OUTPATIENT)
Dept: FAMILY MEDICINE CLINIC | Age: 53
End: 2018-07-26

## 2018-07-26 NOTE — TELEPHONE ENCOUNTER
Tc to the pt. He stated his bottle of Metoprolol he was just prescribed 2 weeks ago, had no refills left on it. The chart was reviewed. The provider's note stated he should return in 3 month's with an appt and that she was giving him enough medication's to last 6 month's. The pt's medication rx's were reviewed. And the Metoprolol rx read #180 tablets with 1 refill. All medication's ordered on his LOV were enough for 6 month's. Th ept was asked if he would like the nurse to call his Pharmacy and check on this for him. The pt stated no he was going to call his Pharmacy and check. The pt was told to call the nurse back at the [facility] if he had any problems or concern's with the medication refill's. The pt asked about making his 3 month appt. The pt was told the calenders were not out for 3 month's ahead of time. He should call in 1 month and ask to make his appt then. We would have a calender for Oct in 1 month. The pt verbalized understanding and thanked the nurse before hanging up.  Melchor Peterson RN

## 2018-07-26 NOTE — TELEPHONE ENCOUNTER
Patient, Maida Neely, wanted to know why he couldn't get more refills on his medications so he wouldn't have to come to see us so often. Please call him.     Margarett Harada April

## 2018-11-08 ENCOUNTER — APPOINTMENT (OUTPATIENT)
Dept: ULTRASOUND IMAGING | Age: 53
DRG: 417 | End: 2018-11-08
Attending: EMERGENCY MEDICINE
Payer: SELF-PAY

## 2018-11-08 ENCOUNTER — APPOINTMENT (OUTPATIENT)
Dept: CT IMAGING | Age: 53
DRG: 417 | End: 2018-11-08
Attending: EMERGENCY MEDICINE
Payer: SELF-PAY

## 2018-11-08 ENCOUNTER — HOSPITAL ENCOUNTER (INPATIENT)
Age: 53
LOS: 10 days | Discharge: HOME | DRG: 417 | End: 2018-11-18
Attending: EMERGENCY MEDICINE | Admitting: INTERNAL MEDICINE
Payer: SELF-PAY

## 2018-11-08 ENCOUNTER — APPOINTMENT (OUTPATIENT)
Dept: MRI IMAGING | Age: 53
DRG: 417 | End: 2018-11-08
Attending: PHYSICIAN ASSISTANT
Payer: SELF-PAY

## 2018-11-08 DIAGNOSIS — I10 ESSENTIAL HYPERTENSION: ICD-10-CM

## 2018-11-08 DIAGNOSIS — E11.9 CONTROLLED TYPE 2 DIABETES MELLITUS WITHOUT COMPLICATION, WITHOUT LONG-TERM CURRENT USE OF INSULIN (HCC): ICD-10-CM

## 2018-11-08 DIAGNOSIS — K85.90 ACUTE PANCREATITIS, UNSPECIFIED COMPLICATION STATUS, UNSPECIFIED PANCREATITIS TYPE: Primary | ICD-10-CM

## 2018-11-08 DIAGNOSIS — K80.20 GALLSTONES: ICD-10-CM

## 2018-11-08 DIAGNOSIS — R73.9 HYPERGLYCEMIA: ICD-10-CM

## 2018-11-08 PROBLEM — N17.9 ARF (ACUTE RENAL FAILURE) (HCC): Status: ACTIVE | Noted: 2018-11-08

## 2018-11-08 PROBLEM — E87.6 HYPOKALEMIA: Status: ACTIVE | Noted: 2018-11-08

## 2018-11-08 PROBLEM — K85.10 GALLSTONE PANCREATITIS: Status: ACTIVE | Noted: 2018-11-08

## 2018-11-08 PROBLEM — R79.89 ABNORMAL LFTS: Status: ACTIVE | Noted: 2018-11-08

## 2018-11-08 LAB
ALBUMIN SERPL-MCNC: 4.1 G/DL (ref 3.5–5)
ALBUMIN/GLOB SERPL: 1 {RATIO} (ref 1.1–2.2)
ALP SERPL-CCNC: 351 U/L (ref 45–117)
ALT SERPL-CCNC: 711 U/L (ref 12–78)
ANION GAP SERPL CALC-SCNC: 15 MMOL/L (ref 5–15)
APPEARANCE UR: CLEAR
AST SERPL-CCNC: 308 U/L (ref 15–37)
BACTERIA URNS QL MICRO: NEGATIVE /HPF
BASOPHILS # BLD: 0 K/UL (ref 0–0.1)
BASOPHILS NFR BLD: 0 % (ref 0–1)
BILIRUB SERPL-MCNC: 7.6 MG/DL (ref 0.2–1)
BILIRUB UR QL CFM: POSITIVE
BUN SERPL-MCNC: 9 MG/DL (ref 6–20)
BUN/CREAT SERPL: 5 (ref 12–20)
CALCIUM SERPL-MCNC: 9.7 MG/DL (ref 8.5–10.1)
CHLORIDE SERPL-SCNC: 93 MMOL/L (ref 97–108)
CO2 SERPL-SCNC: 28 MMOL/L (ref 21–32)
COLOR UR: ABNORMAL
COMMENT, HOLDF: NORMAL
CREAT SERPL-MCNC: 1.67 MG/DL (ref 0.7–1.3)
DIFFERENTIAL METHOD BLD: ABNORMAL
EOSINOPHIL # BLD: 0 K/UL (ref 0–0.4)
EOSINOPHIL NFR BLD: 0 % (ref 0–7)
EPITH CASTS URNS QL MICRO: ABNORMAL /LPF
ERYTHROCYTE [DISTWIDTH] IN BLOOD BY AUTOMATED COUNT: 14.3 % (ref 11.5–14.5)
GLOBULIN SER CALC-MCNC: 4 G/DL (ref 2–4)
GLUCOSE BLD STRIP.AUTO-MCNC: 240 MG/DL (ref 65–100)
GLUCOSE BLD STRIP.AUTO-MCNC: 245 MG/DL (ref 65–100)
GLUCOSE BLD STRIP.AUTO-MCNC: 258 MG/DL (ref 65–100)
GLUCOSE BLD STRIP.AUTO-MCNC: 280 MG/DL (ref 65–100)
GLUCOSE SERPL-MCNC: 303 MG/DL (ref 65–100)
GLUCOSE UR STRIP.AUTO-MCNC: >1000 MG/DL
HCT VFR BLD AUTO: 47.5 % (ref 36.6–50.3)
HGB BLD-MCNC: 16.4 G/DL (ref 12.1–17)
HGB UR QL STRIP: NEGATIVE
IMM GRANULOCYTES # BLD: 0 K/UL (ref 0–0.04)
IMM GRANULOCYTES NFR BLD AUTO: 0 % (ref 0–0.5)
KETONES UR QL STRIP.AUTO: 40 MG/DL
LEUKOCYTE ESTERASE UR QL STRIP.AUTO: NEGATIVE
LIPASE SERPL-CCNC: >3000 U/L (ref 73–393)
LYMPHOCYTES # BLD: 1.6 K/UL (ref 0.8–3.5)
LYMPHOCYTES NFR BLD: 14 % (ref 12–49)
MAGNESIUM SERPL-MCNC: 1.5 MG/DL (ref 1.6–2.4)
MCH RBC QN AUTO: 29.2 PG (ref 26–34)
MCHC RBC AUTO-ENTMCNC: 34.5 G/DL (ref 30–36.5)
MCV RBC AUTO: 84.5 FL (ref 80–99)
MONOCYTES # BLD: 0.9 K/UL (ref 0–1)
MONOCYTES NFR BLD: 8 % (ref 5–13)
NEUTS SEG # BLD: 8.6 K/UL (ref 1.8–8)
NEUTS SEG NFR BLD: 77 % (ref 32–75)
NITRITE UR QL STRIP.AUTO: NEGATIVE
NRBC # BLD: 0 K/UL (ref 0–0.01)
NRBC BLD-RTO: 0 PER 100 WBC
PH UR STRIP: 6 [PH] (ref 5–8)
PLATELET # BLD AUTO: 358 K/UL (ref 150–400)
PMV BLD AUTO: 9.9 FL (ref 8.9–12.9)
POTASSIUM SERPL-SCNC: 2.8 MMOL/L (ref 3.5–5.1)
PROT SERPL-MCNC: 8.1 G/DL (ref 6.4–8.2)
PROT UR STRIP-MCNC: NEGATIVE MG/DL
RBC # BLD AUTO: 5.62 M/UL (ref 4.1–5.7)
RBC #/AREA URNS HPF: ABNORMAL /HPF (ref 0–5)
SAMPLES BEING HELD,HOLD: NORMAL
SERVICE CMNT-IMP: ABNORMAL
SODIUM SERPL-SCNC: 136 MMOL/L (ref 136–145)
SP GR UR REFRACTOMETRY: 1.03 (ref 1–1.03)
UA: UC IF INDICATED,UAUC: ABNORMAL
UROBILINOGEN UR QL STRIP.AUTO: 0.2 EU/DL (ref 0.2–1)
WBC # BLD AUTO: 11.2 K/UL (ref 4.1–11.1)
WBC URNS QL MICRO: ABNORMAL /HPF (ref 0–4)

## 2018-11-08 PROCEDURE — 74011000258 HC RX REV CODE- 258: Performed by: EMERGENCY MEDICINE

## 2018-11-08 PROCEDURE — 74181 MRI ABDOMEN W/O CONTRAST: CPT

## 2018-11-08 PROCEDURE — 83735 ASSAY OF MAGNESIUM: CPT

## 2018-11-08 PROCEDURE — 74011250636 HC RX REV CODE- 250/636: Performed by: INTERNAL MEDICINE

## 2018-11-08 PROCEDURE — 76705 ECHO EXAM OF ABDOMEN: CPT

## 2018-11-08 PROCEDURE — 82962 GLUCOSE BLOOD TEST: CPT

## 2018-11-08 PROCEDURE — 80053 COMPREHEN METABOLIC PANEL: CPT

## 2018-11-08 PROCEDURE — 96361 HYDRATE IV INFUSION ADD-ON: CPT

## 2018-11-08 PROCEDURE — 99283 EMERGENCY DEPT VISIT LOW MDM: CPT

## 2018-11-08 PROCEDURE — 36415 COLL VENOUS BLD VENIPUNCTURE: CPT

## 2018-11-08 PROCEDURE — 74011250636 HC RX REV CODE- 250/636: Performed by: EMERGENCY MEDICINE

## 2018-11-08 PROCEDURE — 74177 CT ABD & PELVIS W/CONTRAST: CPT

## 2018-11-08 PROCEDURE — 81001 URINALYSIS AUTO W/SCOPE: CPT

## 2018-11-08 PROCEDURE — 65270000029 HC RM PRIVATE

## 2018-11-08 PROCEDURE — 74011636637 HC RX REV CODE- 636/637: Performed by: INTERNAL MEDICINE

## 2018-11-08 PROCEDURE — 74011636320 HC RX REV CODE- 636/320: Performed by: RADIOLOGY

## 2018-11-08 PROCEDURE — 96365 THER/PROPH/DIAG IV INF INIT: CPT

## 2018-11-08 PROCEDURE — 74011250637 HC RX REV CODE- 250/637: Performed by: INTERNAL MEDICINE

## 2018-11-08 PROCEDURE — 83690 ASSAY OF LIPASE: CPT

## 2018-11-08 PROCEDURE — 96375 TX/PRO/DX INJ NEW DRUG ADDON: CPT

## 2018-11-08 PROCEDURE — 85025 COMPLETE CBC W/AUTO DIFF WBC: CPT

## 2018-11-08 RX ORDER — SODIUM CHLORIDE 0.9 % (FLUSH) 0.9 %
5-10 SYRINGE (ML) INJECTION AS NEEDED
Status: DISCONTINUED | OUTPATIENT
Start: 2018-11-08 | End: 2018-11-18 | Stop reason: HOSPADM

## 2018-11-08 RX ORDER — DEXTROSE 50 % IN WATER (D50W) INTRAVENOUS SYRINGE
12.5-25 AS NEEDED
Status: DISCONTINUED | OUTPATIENT
Start: 2018-11-08 | End: 2018-11-18 | Stop reason: HOSPADM

## 2018-11-08 RX ORDER — HYDROMORPHONE HYDROCHLORIDE 2 MG/ML
1 INJECTION, SOLUTION INTRAMUSCULAR; INTRAVENOUS; SUBCUTANEOUS
Status: DISCONTINUED | OUTPATIENT
Start: 2018-11-08 | End: 2018-11-14

## 2018-11-08 RX ORDER — INSULIN LISPRO 100 [IU]/ML
INJECTION, SOLUTION INTRAVENOUS; SUBCUTANEOUS EVERY 6 HOURS
Status: DISCONTINUED | OUTPATIENT
Start: 2018-11-08 | End: 2018-11-18 | Stop reason: HOSPADM

## 2018-11-08 RX ORDER — ONDANSETRON 2 MG/ML
4 INJECTION INTRAMUSCULAR; INTRAVENOUS
Status: DISCONTINUED | OUTPATIENT
Start: 2018-11-08 | End: 2018-11-13 | Stop reason: DRUGHIGH

## 2018-11-08 RX ORDER — MORPHINE SULFATE 4 MG/ML
2 INJECTION INTRAVENOUS
Status: DISCONTINUED | OUTPATIENT
Start: 2018-11-08 | End: 2018-11-08

## 2018-11-08 RX ORDER — SODIUM CHLORIDE, SODIUM LACTATE, POTASSIUM CHLORIDE, CALCIUM CHLORIDE 600; 310; 30; 20 MG/100ML; MG/100ML; MG/100ML; MG/100ML
1000 INJECTION, SOLUTION INTRAVENOUS CONTINUOUS
Status: DISCONTINUED | OUTPATIENT
Start: 2018-11-08 | End: 2018-11-08 | Stop reason: SDUPTHER

## 2018-11-08 RX ORDER — HYDROMORPHONE HYDROCHLORIDE 2 MG/ML
0.5 INJECTION, SOLUTION INTRAMUSCULAR; INTRAVENOUS; SUBCUTANEOUS
Status: COMPLETED | OUTPATIENT
Start: 2018-11-08 | End: 2018-11-08

## 2018-11-08 RX ORDER — SODIUM CHLORIDE 0.9 % (FLUSH) 0.9 %
5-10 SYRINGE (ML) INJECTION EVERY 8 HOURS
Status: DISCONTINUED | OUTPATIENT
Start: 2018-11-08 | End: 2018-11-18 | Stop reason: HOSPADM

## 2018-11-08 RX ORDER — AMLODIPINE BESYLATE 5 MG/1
5 TABLET ORAL DAILY
Status: DISCONTINUED | OUTPATIENT
Start: 2018-11-08 | End: 2018-11-18 | Stop reason: HOSPADM

## 2018-11-08 RX ORDER — HEPARIN SODIUM 5000 [USP'U]/ML
5000 INJECTION, SOLUTION INTRAVENOUS; SUBCUTANEOUS EVERY 8 HOURS
Status: DISCONTINUED | OUTPATIENT
Start: 2018-11-08 | End: 2018-11-09

## 2018-11-08 RX ORDER — ASPIRIN 81 MG/1
81 TABLET ORAL
COMMUNITY

## 2018-11-08 RX ORDER — MAGNESIUM SULFATE 100 %
4 CRYSTALS MISCELLANEOUS AS NEEDED
Status: DISCONTINUED | OUTPATIENT
Start: 2018-11-08 | End: 2018-11-18 | Stop reason: HOSPADM

## 2018-11-08 RX ORDER — METOPROLOL TARTRATE 25 MG/1
25 TABLET, FILM COATED ORAL EVERY 12 HOURS
Status: DISCONTINUED | OUTPATIENT
Start: 2018-11-08 | End: 2018-11-18 | Stop reason: HOSPADM

## 2018-11-08 RX ORDER — SODIUM CHLORIDE, SODIUM LACTATE, POTASSIUM CHLORIDE, CALCIUM CHLORIDE 600; 310; 30; 20 MG/100ML; MG/100ML; MG/100ML; MG/100ML
150 INJECTION, SOLUTION INTRAVENOUS CONTINUOUS
Status: DISCONTINUED | OUTPATIENT
Start: 2018-11-08 | End: 2018-11-10

## 2018-11-08 RX ORDER — POTASSIUM CHLORIDE 7.45 MG/ML
10 INJECTION INTRAVENOUS
Status: DISPENSED | OUTPATIENT
Start: 2018-11-08 | End: 2018-11-08

## 2018-11-08 RX ADMIN — POTASSIUM CHLORIDE 10 MEQ: 10 INJECTION, SOLUTION INTRAVENOUS at 08:10

## 2018-11-08 RX ADMIN — METOPROLOL TARTRATE 25 MG: 25 TABLET ORAL at 10:23

## 2018-11-08 RX ADMIN — INSULIN LISPRO 3 UNITS: 100 INJECTION, SOLUTION INTRAVENOUS; SUBCUTANEOUS at 12:30

## 2018-11-08 RX ADMIN — HYDROMORPHONE HYDROCHLORIDE 1 MG: 2 INJECTION, SOLUTION INTRAMUSCULAR; INTRAVENOUS; SUBCUTANEOUS at 10:30

## 2018-11-08 RX ADMIN — POTASSIUM CHLORIDE 10 MEQ: 10 INJECTION, SOLUTION INTRAVENOUS at 03:53

## 2018-11-08 RX ADMIN — SODIUM CHLORIDE, SODIUM LACTATE, POTASSIUM CHLORIDE, AND CALCIUM CHLORIDE 150 ML/HR: 600; 310; 30; 20 INJECTION, SOLUTION INTRAVENOUS at 23:08

## 2018-11-08 RX ADMIN — PIPERACILLIN SODIUM,TAZOBACTAM SODIUM 3.38 G: 3; .375 INJECTION, POWDER, FOR SOLUTION INTRAVENOUS at 05:05

## 2018-11-08 RX ADMIN — IOPAMIDOL 100 ML: 755 INJECTION, SOLUTION INTRAVENOUS at 03:38

## 2018-11-08 RX ADMIN — POTASSIUM CHLORIDE 10 MEQ: 10 INJECTION, SOLUTION INTRAVENOUS at 05:05

## 2018-11-08 RX ADMIN — HYDROMORPHONE HYDROCHLORIDE 1 MG: 2 INJECTION, SOLUTION INTRAMUSCULAR; INTRAVENOUS; SUBCUTANEOUS at 17:13

## 2018-11-08 RX ADMIN — MORPHINE SULFATE 2 MG: 4 INJECTION, SOLUTION INTRAMUSCULAR; INTRAVENOUS at 08:10

## 2018-11-08 RX ADMIN — HYDROMORPHONE HYDROCHLORIDE 0.5 MG: 2 INJECTION, SOLUTION INTRAMUSCULAR; INTRAVENOUS; SUBCUTANEOUS at 02:25

## 2018-11-08 RX ADMIN — METOPROLOL TARTRATE 25 MG: 25 TABLET ORAL at 21:44

## 2018-11-08 RX ADMIN — SODIUM CHLORIDE, SODIUM LACTATE, POTASSIUM CHLORIDE, AND CALCIUM CHLORIDE 150 ML/HR: 600; 310; 30; 20 INJECTION, SOLUTION INTRAVENOUS at 05:00

## 2018-11-08 RX ADMIN — INSULIN LISPRO 5 UNITS: 100 INJECTION, SOLUTION INTRAVENOUS; SUBCUTANEOUS at 06:47

## 2018-11-08 RX ADMIN — Medication 10 ML: at 15:00

## 2018-11-08 RX ADMIN — INSULIN LISPRO 5 UNITS: 100 INJECTION, SOLUTION INTRAVENOUS; SUBCUTANEOUS at 17:34

## 2018-11-08 RX ADMIN — AMLODIPINE BESYLATE 5 MG: 5 TABLET ORAL at 10:23

## 2018-11-08 RX ADMIN — HEPARIN SODIUM 5000 UNITS: 5000 INJECTION INTRAVENOUS; SUBCUTANEOUS at 06:47

## 2018-11-08 RX ADMIN — HEPARIN SODIUM 5000 UNITS: 5000 INJECTION INTRAVENOUS; SUBCUTANEOUS at 15:00

## 2018-11-08 RX ADMIN — SODIUM CHLORIDE, SODIUM LACTATE, POTASSIUM CHLORIDE, AND CALCIUM CHLORIDE 1000 ML: 600; 310; 30; 20 INJECTION, SOLUTION INTRAVENOUS at 03:32

## 2018-11-08 RX ADMIN — Medication 10 ML: at 05:05

## 2018-11-08 RX ADMIN — HYDROMORPHONE HYDROCHLORIDE 1 MG: 2 INJECTION, SOLUTION INTRAMUSCULAR; INTRAVENOUS; SUBCUTANEOUS at 21:39

## 2018-11-08 RX ADMIN — Medication 10 ML: at 21:41

## 2018-11-08 RX ADMIN — MORPHINE SULFATE 2 MG: 4 INJECTION, SOLUTION INTRAMUSCULAR; INTRAVENOUS at 05:08

## 2018-11-08 RX ADMIN — SODIUM CHLORIDE 1000 ML: 900 INJECTION, SOLUTION INTRAVENOUS at 02:25

## 2018-11-08 NOTE — PROGRESS NOTES
Spiritual Care Assessment/Progress Note [address] 
 
 
NAME: Stefani Ahumada      MRN: [id number] AGE: 48 y.o. SEX: male Nondenominational Affiliation: No preference Language: English  
 
11/8/2018     Total Time (in minutes): 26 Spiritual Assessment begun in [facility] 5M1 MED SURG 1 through conversation with: 
  
    [x]Patient        [] Family    [] Friend(s) Reason for Consult: Advance medical directive consult Spiritual beliefs: (Please include comment if needed) [x] Identifies with a marysol tradition: Delmi Joshua     
   [] Supported by a marysol community:        
   [] Claims no spiritual orientation:       
   [] Seeking spiritual identity:            
   [] Adheres to an individual form of spirituality:       
   [] Not able to assess:                   
 
    
Identified resources for coping:  
   [] Prayer                           
   [] Music                  [] Guided Imagery 
   [] Family/friends                 [] Pet visits [] Devotional reading                         [] Unknown 
   [] Other:                                  
 
 
Interventions offered during this visit: (See comments for more details) Patient Interventions: Advance medical directive consult, Affirmation of marysol, Affirmation of emotions/emotional suffering, Iconic (affirming the presence of God/Higher Power) Plan of Care: 
 
 [] Support spiritual and/or cultural needs  
 [] Support AMD and/or advance care planning process    
 [] Support grieving process 
 [] Coordinate Rites and/or Rituals  
 [] Coordination with community clergy [] No spiritual needs identified at this time 
 [] Detailed Plan of Care below (See Comments)  [] Make referral to Music Therapy 
[] Make referral to Pet Therapy    
[] Make referral to Addiction services 
[] Make referral to [facility] 
[] Make referral to Spiritual Care Partner 
[] No future visits requested       
[x] Follow up visits as needed Comments: Responded to In Basket request fro an Advance Medical Directive (AMD) consult. Mr. Nikkie Quintero share he has no spouse and three adult children. He shared he as a bit groggy from mediation. Provided a brief overview of an AMD and shared by state law his three children would all be decision makers together. Left him a AMD forma dn Your Right to Decide booklet for him to review at a later time. Mr. Leisa Mortimer has strong Bahai Nirmala in God, considers himself saved and blessed. He shared about not being able to eat or drink at this time. We discussed the possibility of view this a spiritual fasting time as well as a body healing time. Provided spiritual presence. Visited by: Mehdi Dooley MS., [address] ([id number]) Visited by: Medhi Dooley MS., [address] ([id number])

## 2018-11-08 NOTE — ED NOTES
Patient's oxygen sats 87-89% while patient was sleeping, 2 liters of oxygen applied prior to narcotic administration. Oxygen sats do come up when patient is awake and conversing, denies history of sleep apnea.

## 2018-11-08 NOTE — ROUTINE PROCESS
TRANSFER - IN REPORT: 
 
Verbal report received from Ei Ei(name) on Jody Foote  being received from ED(unit) for routine progression of care Report consisted of patients Situation, Background, Assessment and  
Recommendations(SBAR). Information from the following report(s) SBAR, Kardex, MAR, Recent Results and Quality Measures was reviewed with the receiving nurse. Opportunity for questions and clarification was provided. Assessment completed upon patients arrival to unit and care assumed.

## 2018-11-08 NOTE — ROUTINE PROCESS
Bedside and Verbal shift change report given to MARIJA Snider (oncoming nurse) by Bayron Hernández RN (offgoing nurse). Report included the following information SBAR, Kardex, Intake/Output, MAR, Recent Results and Quality Measures.

## 2018-11-08 NOTE — CONSULTS
Assessment:     Gallstone pancreatitis  Possible choledocholithiasis     Plan:     Dilaudid PRN pain  MRCP ordered, if positive for choledocholithiasis would recommend ERCP with stone extraction prior to cholecystectomy  If no choledocholithiasis would make patient NPO for possible laparoscopic cholecystectomy 11/9/18. Risks discussed with the patient included the risk of bleeding, infection, hernia, bile leak, injury to intra-abdominal organs or blood vessels, common bile duct injury, conversion to open surgery. All questions answered  Continue supportive care for pancreatitis    Signed By: Yann Matute MD  [facility]  Office:  [phone]    November 8, 2018          General Surgery Consult    Subjective:      I was asked to see [facility] by Meg Mendez MD for management of gallstone pancreatitis. The patient is a 48 y.o. male with hypertension and diabetes who presents for evaluation of a 2 day history of progressive severe post-prandial epigastric abdominal pain and nausea. Work-up in the ED showed hyperbilirubinemia, elevated transaminases and a lipase of > 3000. Abdominal ultrasound showed cholelithiasis without acute cholecystitis of biliary dilation. Pain has improved since admission. Past Medical History: Hypertension, Diabetes type II    No past surgical history on file.    Family History   Family history unknown: Yes     Social History     Socioeconomic History    Marital status: SINGLE     Spouse name: Not on file    Number of children: Not on file    Years of education: Not on file    Highest education level: Not on file   Social Needs    Financial resource strain: Not on file    Food insecurity - worry: Not on file    Food insecurity - inability: Not on file   [employer] needs - medical: Not on file   [employer] needs - non-medical: Not on file   Occupational History    Not on file   Tobacco Use    Smoking status: Never Smoker    Smokeless tobacco: Never Used   Substance and Sexual Activity    Alcohol use: No     Alcohol/week: 0.0 oz    Drug use: No    Sexual activity: Not on file   Other Topics Concern    Not on file   Social History Narrative    Not on file      Current Facility-Administered Medications   Medication Dose Route Frequency    amLODIPine (NORVASC) tablet 5 mg  5 mg Oral DAILY    metoprolol tartrate (LOPRESSOR) tablet 25 mg  25 mg Oral Q12H    sodium chloride (NS) flush 5-10 mL  5-10 mL IntraVENous Q8H    sodium chloride (NS) flush 5-10 mL  5-10 mL IntraVENous PRN    heparin (porcine) injection 5,000 Units  5,000 Units SubCUTAneous Q8H    ondansetron (ZOFRAN) injection 4 mg  4 mg IntraVENous Q4H PRN    lactated Ringers infusion  150 mL/hr IntraVENous CONTINUOUS    insulin lispro (HUMALOG) injection   SubCUTAneous Q6H    glucose chewable tablet 16 g  4 Tab Oral PRN    dextrose (D50W) injection syrg 12.5-25 g  12.5-25 g IntraVENous PRN    glucagon (GLUCAGEN) injection 1 mg  1 mg IntraMUSCular PRN    influenza vaccine 2018-19 (6 mos+)(PF) (FLUARIX QUAD/FLULAVAL QUAD) injection 0.5 mL  0.5 mL IntraMUSCular PRIOR TO DISCHARGE    HYDROmorphone (PF) (DILAUDID) injection 1 mg  1 mg IntraVENous Q4H PRN      No Known Allergies    Review of Systems:     []     Unable to obtain  ROS due to  []    mental status change  []    sedated   []    intubated   [x]    Total of 12 system negative, unless specified below or in HPI:  Constitutional: negative fever, negative chills, negative weight loss  Eyes:   negative visual changes  ENT:   negative sore throat, tongue or lip swelling  Respiratory:  negative cough, negative dyspnea  Cards:  negative for chest pain, palpitations, lower extremity edema  GI:   negative for nausea, vomiting, diarrhea, and abdominal pain  :  negative for frequency, dysuria  Integument:  negative for rash and pruritus  Heme:  negative for easy bruising and gum/nose bleeding  Musculoskel: negative for myalgias, back pain and muscle weakness  Neuro:  negative for headaches, dizziness, vertigo  Psych:  negative for feelings of anxiety, depression     Objective:        Patient Vitals for the past 8 hrs:   BP Temp Pulse Resp SpO2   18 1224 (!) 160/94 98.7 °F (37.1 °C) 84 18 94 %   18 0754 148/90 98.6 °F (37 °C) 88 18 92 %   18 0631 138/86 98 °F (36.7 °C) 86 18 93 %   18 0519 140/90 97.9 °F (36.6 °C) 87 18 94 %       Temp (24hrs), Av.3 °F (36.8 °C), Min:97.9 °F (36.6 °C), Max:98.7 °F (37.1 °C)      Physical Exam:  General:  Alert, cooperative, no distress, appears stated age. Eyes:  Scleral icterus. PERRL, EOMs intact. Nose: Nares normal. Septum midline. Mucosa normal. No drainage or sinus tenderness. Mouth/Throat: Lips, mucosa, and tongue normal. Teeth and gums normal.   Neck: Supple, symmetrical, trachea midline, no adenopathy, thyroid: no enlargment/tenderness/nodules, no carotid bruit and no JVD. Back:   Symmetric, no curvature. ROM normal. No CVA tenderness. Lungs:   Clear to auscultation bilaterally. Heart:  Regular rate and rhythm, S1, S2 normal, no murmur, click, rub or gallop. Abdomen:   Soft, non-tender. Bowel sounds normal. No masses,  No organomegaly. Extremities: Extremities normal, atraumatic, no cyanosis or edema. Pulses: 2+ and symmetric all extremities. Skin: Skin color, texture, turgor normal. No rashes or lesions   Lymph nodes: Cervical, supraclavicular, and axillary nodes normal.     Labs:   Recent Labs     18   WBC 11.2*   HGB 16.4   HCT 47.5        Recent Labs     18      K 2.8*   CL 93*   CO2 28   *   BUN 9   CREA 1.67*   CA 9.7   MG 1.5*   ALB 4.1   TBILI 7.6*   SGOT 308*   *     No results for input(s): INR in the last 72 hours.     No lab exists for component: INREXT

## 2018-11-08 NOTE — PROGRESS NOTES
[facility]: MED RECONCILIATION Comments/Recommendations: ? Med rec performed via interview with patient and via identification of pill bottles. Patient was a good historian. Medications added: · ASA 81 mg Medications removed: · Lovastatin--pt states he does not take anymore because his previous lab values were WNL Information obtained from: patient, bottles Chief Complaint for this Admission: Chief Complaint Patient presents with  Abdominal Pain Allergies: Patient has no known allergies. Prior to Admission Medications:  
Prior to Admission Medications Prescriptions Last Dose Informant Patient Reported? Taking? amLODIPine (NORVASC) 5 mg tablet 2018 Self No Yes Sig: Take 1 Tab by mouth daily. aspirin delayed-release 81 mg tablet 2018 Self Yes Yes Sig: Take 81 mg by mouth daily (with dinner). glipiZIDE (GLUCOTROL) 5 mg tablet 2018 Self No Yes Si po qday 30 min ac dinner  
metFORMIN ER (GLUCOPHAGE XR) 500 mg tablet 2018 Self No Yes Sig: TAKE 2 TABLETS DAILY WITH BREAKFAST AND THEN 2 TABLETS DAILY WITH DINNER  
metoprolol tartrate (LOPRESSOR) 25 mg tablet 2018 Self No Yes Sig: TAKE ONE TABLET BY MOUTH TWICE DAILY FOR HYPERTENSION  
triamterene-hydroCHLOROthiazide (MAXZIDE) 37.5-25 mg per tablet 2018 Self No Yes Sig: TAKE ONE TABLET BY MOUTH ONE TIME DAILY Facility-Administered Medications: None Tae Trujillo PHARMD   Contact: [phone]

## 2018-11-08 NOTE — ACP (ADVANCE CARE PLANNING)
Responded to In Basket request fro an Advance Medical Directive (AMD) consult. Mr. Goel Many share he has no spouse and three adult children. He shared he as a bit groggy from mediation. Provided a brief overview of an AMD and shared by state law his three children would all be decision makers together. Left him a AMD forma dn Your Right to Decide booklet for him to review at a later time.   Visited by: Sylvia Tran., MS., [id number] [facility] ([id number])

## 2018-11-08 NOTE — PROGRESS NOTES
Saw patient. Reviewed chart, labs, orders. Discussed with surgery. Continue current plan. Non billing visit.

## 2018-11-08 NOTE — ED NOTES
TRANSFER - OUT REPORT: 
 
Verbal report given to Javan Garner RN on Stefani Ahumada  being transferred to 5th Floor for routine progression of care Report consisted of patients Situation, Background, Assessment and  
Recommendations(SBAR). Information from the following report(s) SBAR, Kardex, ED Summary, MAR, Accordion and Recent Results was reviewed with the receiving nurse. Lines:  
Peripheral IV 11/08/18 Left Antecubital (Active) Site Assessment Clean, dry, & intact 11/8/2018  2:18 AM  
Phlebitis Assessment 0 11/8/2018  2:18 AM  
Infiltration Assessment 0 11/8/2018  2:18 AM  
Dressing Status Clean, dry, & intact 11/8/2018  2:18 AM  
Hub Color/Line Status Pink 11/8/2018  2:18 AM  
  
 
Opportunity for questions and clarification was provided. Patient transported with: 
 Registered Nurse

## 2018-11-08 NOTE — CONSULTS
Gastroenterology Consultation Note      Admit Date: 11/8/2018  Consult Date: 11/8/2018   I greatly appreciate your asking me to see Rogelio Still, thank you very much for the opportunity to participate in his care. Narrative Assessment and Plan   · Gallstone pancreatitis  · Hyperbilirubinemia  · Abnormal liver enzymes  · Hepatic steatosis  · Diabetes    Suspect that patient may have passed a stone. Labs indicated obstructive pattern, but CT scan and US without evidence of choledocholithiasis. Will proceed with MRCP to further evaluate. If + will need ERCP for stone extraction  Conservative management for pancreatitis: NPO, IV fluids, prn analgesics and antiemetics  Surgery on board as patient will need cholecystectomy prior to discharge    Discussed diagnosis of hepatic steatosis with patient - recommend 1500 calorie diet, increase physical activity, and weight loss    Due to CRC screening which can be arrange as outpatient    Following    Subjective:     Chief Complaint: abdominal pain    History of Present Illness: Patient is a 48 y.o. male with a PMH of diabetes admitted on 11/8/2018 with abdominal pain x 5 days. The pain began insidiously and is described as a moderate, dull and aching pain located in the epigastrium and RUQ. Pain was initially intermittent but became persistent. Associated with early satiety and nausea. Patient denies any diarrhea, bleeding, or weight loss. Patient denies any prior similar episodes. No known history of gallstones, heavy alcohol use, or elevated lipids. New medication: glipizide 1 month ago. In ED: lipase >3000, Tbili 7.6, abnormal liver enzymes; CT scan shows hepatic steatosis and pancreatitis. US shows cholecystolithiasis. PCP:  Other, MD Manuel    No past medical history on file. No past surgical history on file.     Social History     Tobacco Use    Smoking status: Never Smoker    Smokeless tobacco: Never Used   Substance Use Topics    Alcohol use: No     Alcohol/week: 0.0 oz        Family History   Family history unknown: Yes        No Known Allergies         Home Medications:  Prior to Admission Medications   Prescriptions Last Dose Informant Patient Reported? Taking? amLODIPine (NORVASC) 5 mg tablet   No No   Sig: Take 1 Tab by mouth daily. glipiZIDE (GLUCOTROL) 5 mg tablet   No No   Si po qday 30 min ac dinner   lovastatin (MEVACOR) 20 mg tablet   No No   Sig: Take 2 Tabs by mouth nightly.    metFORMIN ER (GLUCOPHAGE XR) 500 mg tablet   No No   Sig: TAKE 2 TABLETS DAILY WITH BREAKFAST AND THEN 2 TABLETS DAILY WITH DINNER   metoprolol tartrate (LOPRESSOR) 25 mg tablet   No No   Sig: TAKE ONE TABLET BY MOUTH TWICE DAILY FOR HYPERTENSION   triamterene-hydroCHLOROthiazide (MAXZIDE) 37.5-25 mg per tablet   No No   Sig: TAKE ONE TABLET BY MOUTH ONE TIME DAILY      Facility-Administered Medications: None       Hospital Medications:  Current Facility-Administered Medications   Medication Dose Route Frequency    lactated Ringers infusion 1,000 mL  1,000 mL IntraVENous CONTINUOUS    amLODIPine (NORVASC) tablet 5 mg  5 mg Oral DAILY    metoprolol tartrate (LOPRESSOR) tablet 25 mg  25 mg Oral Q12H    sodium chloride (NS) flush 5-10 mL  5-10 mL IntraVENous Q8H    sodium chloride (NS) flush 5-10 mL  5-10 mL IntraVENous PRN    heparin (porcine) injection 5,000 Units  5,000 Units SubCUTAneous Q8H    ondansetron (ZOFRAN) injection 4 mg  4 mg IntraVENous Q4H PRN    lactated Ringers infusion  150 mL/hr IntraVENous CONTINUOUS    insulin lispro (HUMALOG) injection   SubCUTAneous Q6H    glucose chewable tablet 16 g  4 Tab Oral PRN    dextrose (D50W) injection syrg 12.5-25 g  12.5-25 g IntraVENous PRN    glucagon (GLUCAGEN) injection 1 mg  1 mg IntraMUSCular PRN    influenza vaccine - (6 mos+)(PF) (FLUARIX QUAD/FLULAVAL QUAD) injection 0.5 mL  0.5 mL IntraMUSCular PRIOR TO DISCHARGE    HYDROmorphone (PF) (DILAUDID) injection 1 mg  1 mg IntraVENous Q4H PRN       Review of Systems: Admission ROS by Marisol Parikh MD from 11/8/2018 were reviewed with the patient and changes (other than per HPI) include: none      Objective:     Physical Exam:  Visit Vitals  /90 (BP 1 Location: Left arm, BP Patient Position: At rest)   Pulse 88   Temp 98.6 °F (37 °C)   Resp 18   Ht 5' 8\" (1.727 m)   Wt 110.2 kg (243 lb)   SpO2 92%   BMI 36.95 kg/m²     SpO2 Readings from Last 6 Encounters:   11/08/18 92%        No intake or output data in the 24 hours ending 11/08/18 0853   General: no distress, comfortable  Skin:  Jaundice  HEENT: Pupils equal, sclera icteric  Cardiovascular: No abnormal audible heart sounds, well perfused, no edema  Respiratory:  No abnormal audible breath sounds, normal respiratory effort, no throacic deformity  GI: bowel sounds present, soft, nondistended, mild epigastric tenderness. No rebound or guarding. Musculoskeletal:  No skeletal deformity nor acute arthritis noted. Neurological:  Motor and sensory function intact in upper extremeties  Psychiatric:  Normal affect, memory intact, appears to have insight into current illness    Laboratory:    Recent Results (from the past 24 hour(s))   CBC WITH AUTOMATED DIFF    Collection Time: 11/08/18  2:16 AM   Result Value Ref Range    WBC 11.2 (H) 4.1 - 11.1 K/uL    RBC 5.62 4.10 - 5.70 M/uL    HGB 16.4 12.1 - 17.0 g/dL    HCT 47.5 36.6 - 50.3 %    MCV 84.5 80.0 - 99.0 FL    MCH 29.2 26.0 - 34.0 PG    MCHC 34.5 30.0 - 36.5 g/dL    RDW 14.3 11.5 - 14.5 %    PLATELET 513 790 - 634 K/uL    MPV 9.9 8.9 - 12.9 FL    NRBC 0.0 0  WBC    ABSOLUTE NRBC 0.00 0.00 - 0.01 K/uL    NEUTROPHILS 77 (H) 32 - 75 %    LYMPHOCYTES 14 12 - 49 %    MONOCYTES 8 5 - 13 %    EOSINOPHILS 0 0 - 7 %    BASOPHILS 0 0 - 1 %    IMMATURE GRANULOCYTES 0 0.0 - 0.5 %    ABS. NEUTROPHILS 8.6 (H) 1.8 - 8.0 K/UL    ABS. LYMPHOCYTES 1.6 0.8 - 3.5 K/UL    ABS. MONOCYTES 0.9 0.0 - 1.0 K/UL    ABS. EOSINOPHILS 0.0 0.0 - 0.4 K/UL    ABS. BASOPHILS 0.0 0.0 - 0.1 K/UL    ABS. IMM. GRANS. 0.0 0.00 - 0.04 K/UL    DF AUTOMATED     METABOLIC PANEL, COMPREHENSIVE    Collection Time: 11/08/18  2:16 AM   Result Value Ref Range    Sodium 136 136 - 145 mmol/L    Potassium 2.8 (L) 3.5 - 5.1 mmol/L    Chloride 93 (L) 97 - 108 mmol/L    CO2 28 21 - 32 mmol/L    Anion gap 15 5 - 15 mmol/L    Glucose 303 (H) 65 - 100 mg/dL    BUN 9 6 - 20 MG/DL    Creatinine 1.67 (H) 0.70 - 1.30 MG/DL    BUN/Creatinine ratio 5 (L) 12 - 20      GFR est AA 52 (L) >60 ml/min/1.73m2    GFR est non-AA 43 (L) >60 ml/min/1.73m2    Calcium 9.7 8.5 - 10.1 MG/DL    Bilirubin, total 7.6 (H) 0.2 - 1.0 MG/DL    ALT (SGPT) 711 (H) 12 - 78 U/L    AST (SGOT) 308 (H) 15 - 37 U/L    Alk. phosphatase 351 (H) 45 - 117 U/L    Protein, total 8.1 6.4 - 8.2 g/dL    Albumin 4.1 3.5 - 5.0 g/dL    Globulin 4.0 2.0 - 4.0 g/dL    A-G Ratio 1.0 (L) 1.1 - 2.2     LIPASE    Collection Time: 11/08/18  2:16 AM   Result Value Ref Range    Lipase >3,000 (H) 73 - 393 U/L   SAMPLES BEING HELD    Collection Time: 11/08/18  2:16 AM   Result Value Ref Range    SAMPLES BEING HELD RD,TOYA     COMMENT        Add-on orders for these samples will be processed based on acceptable specimen integrity and analyte stability, which may vary by analyte.    MAGNESIUM    Collection Time: 11/08/18  2:16 AM   Result Value Ref Range    Magnesium 1.5 (L) 1.6 - 2.4 mg/dL   URINALYSIS W/ REFLEX CULTURE    Collection Time: 11/08/18  4:05 AM   Result Value Ref Range    Color YELLOW/STRAW      Appearance CLEAR CLEAR      Specific gravity 1.030 1.003 - 1.030      pH (UA) 6.0 5.0 - 8.0      Protein NEGATIVE  NEG mg/dL    Glucose >1,000 (A) NEG mg/dL    Ketone 40 (A) NEG mg/dL    Blood NEGATIVE  NEG      Urobilinogen 0.2 0.2 - 1.0 EU/dL    Nitrites NEGATIVE  NEG      Leukocyte Esterase NEGATIVE  NEG      Bilirubin UA, confirm POSITIVE (A) NEG      WBC 0-4 0 - 4 /hpf    RBC 0-5 0 - 5 /hpf    Epithelial cells FEW FEW /lpf    Bacteria NEGATIVE  NEG /hpf    UA:UC IF INDICATED CULTURE NOT INDICATED BY UA RESULT CNI     GLUCOSE, POC    Collection Time: 11/08/18  6:24 AM   Result Value Ref Range    Glucose (POC) 280 (H) 65 - 100 mg/dL    Performed by Romelia Teresa (PCT)      CT Results (most recent):  Results from Hospital Encounter encounter on 11/08/18   CT ABD PELV W CONT    Narrative EXAM:  CT ABD PELV W CONT    INDICATION: mid abd pain    COMPARISON: None. CONTRAST:  100 mL of Isovue-370. TECHNIQUE:   Following the uneventful intravenous administration of contrast, thin axial  images were obtained through the abdomen and pelvis. Coronal and sagittal  reconstructions were generated. Oral contrast was not administered. CT dose  reduction was achieved through use of a standardized protocol tailored for this  examination and automatic exposure control for dose modulation. FINDINGS:   LUNG BASES: Clear. INCIDENTALLY IMAGED HEART AND MEDIASTINUM: Unremarkable. LIVER: No mass or biliary dilatation. GALLBLADDER: Unremarkable. SPLEEN: No mass. PANCREAS: There is peripancreatic inflammatory change and fluid with no  organized collection. No ductal dilation or mass. ADRENALS: Unremarkable. KIDNEYS: No mass, calculus, or hydronephrosis. Cyst in lower pole right kidney. STOMACH: Unremarkable. SMALL BOWEL: No dilatation or wall thickening. COLON: No dilatation or wall thickening. APPENDIX: Unremarkable. PERITONEUM: No ascites or pneumoperitoneum. RETROPERITONEUM: No lymphadenopathy or aortic aneurysm. REPRODUCTIVE ORGANS: The seminal vesicles and prostate appear normal.  URINARY BLADDER: No mass or calculus. BONES: No destructive bone lesion. ADDITIONAL COMMENTS: N/A      Impression IMPRESSION: CT findings compatible with pancreatitis with peripancreatic edema  and free fluid and without identified complication.      US Results (most recent):  Results from [place] encounter on 11/08/18   US ABD LTD    Narrative EXAM: Right upper quadrant limited abdominal ultrasound. CLINICAL INDICATION:  ruq. Seymour Wilder TECHNIQUE: High-resolution selected color flow evaluation of the right upper  quadrant performed. COMPARISON:  None. FINDINGS:    The gallbladder contains stones but otherwise appears normal with no wall  thickening, pericholecystic fluid, or other abnormality. The patient is reported  to deny pain with direct insonation of the gallbladder. The common bile duct  measures 3.2 mm. The liver is diffusely echogenic with no focal lesion or intrahepatic biliary  ductal dilation. There is hepatopetal portal venous flow within the liver. The pancreas is obscured by overlying bowel gas/body habitus and not well  evaluated. The right kidney appears normal with no identified calculus, mass, or  hydronephrosis. Right renal length measures 11.4 cm. Impression IMPRESSION: Cholecystolithiasis. Pancreas obscured. Increased hepatic  echogenicity compatible with diffuse hepatocellular process, most commonly seen  in steatosis. Assessment/Plan:     Active Problems:    Diabetes mellitus type 2, controlled ([address] (2/16/2013)      Obesity, unspecified (2/16/2013)      Other hyperlipidemia (2/16/2013)      Essential hypertension (2/16/2013)      Pancreatitis (11/8/2018)      Hypokalemia (11/8/2018)      ARF (acute renal failure) ([address] (11/8/2018)      Abnormal LFTs (11/8/2018)      Cholelithiasis (11/8/2018)      Gallstone pancreatitis (11/8/2018)         See above narrative for full detail.     Yue Napoles PA-C  11/08/18  8:53 AM    I have interviewed and examined patient with addendum to note above and formulation care plan to reflect my evaluation    Yvonne Loepz M.D.

## 2018-11-08 NOTE — ED PROVIDER NOTES
48 y.o. male with no significant past medical history presents with chief complaint of an intermittent, central lower abd pain, 7/10 in severity, onset 5 days ago and progressively worsening. Pt reports associated constipation and \"dark urine,\" but denies that the urine is darker than tea. He notes that his stools have been looser, but denies diarrhea. He also notes that his eyes have been a slightly yellow. Pt denies any abd surgeries in the past. Pt denies having taken any medications for pain management. Pt denies any nausea, vomiting, bloody/tarry stools, urinary sx, fevers, chills, cp, or shortness of breath currently. There are no other acute medical concerns at this time. Social hx: Patient denies Tobacco use. Denies EtOH use. Denies illicit drug abuse. PCP: Jenna, MD Manuel 
 
Note written by Kimberly De Guzman, as dictated by Reji Santiago MD 2:10 AM 
 
 
 
The history is provided by the patient. No  was used. No past medical history on file. No past surgical history on file. No family history on file. Social History Socioeconomic History  Marital status: SINGLE Spouse name: Not on file  Number of children: Not on file  Years of education: Not on file  Highest education level: Not on file Social Needs  Financial resource strain: Not on file  Food insecurity - worry: Not on file  Food insecurity - inability: Not on file  Transportation needs - medical: Not on file  Transportation needs - non-medical: Not on file Occupational History  Not on file Tobacco Use  Smoking status: Never Smoker  Smokeless tobacco: Never Used Substance and Sexual Activity  Alcohol use: No  
  Alcohol/week: 0.0 oz  Drug use: No  
 Sexual activity: Not on file Other Topics Concern  Not on file Social History Narrative  Not on file ALLERGIES: Patient has no known allergies. Review of Systems Constitutional: Negative for chills and fever. Respiratory: Negative for shortness of breath. Cardiovascular: Negative for chest pain. Gastrointestinal: Positive for abdominal pain (lower) and constipation. Negative for blood in stool, diarrhea, nausea and vomiting. Negative for black/tarry stool. Genitourinary: Negative for decreased urine volume, difficulty urinating, dysuria, hematuria and urgency. All other systems reviewed and are negative. Vitals:  
 11/08/18 0155 BP: 136/73 Pulse: 91  
Resp: 20 Temp: 98.2 °F (36.8 °C) SpO2: 97% Weight: 110.2 kg (243 lb) Height: 5' 8\" (1.727 m) Physical Exam  
Physical Examination: General appearance - alert, mild distress, oriented to person, place, and time and normal appearing weight Eyes - pupils equal and reactive, extraocular eye movements intact Neck - supple, no significant adenopathy Chest - clear to auscultation, no wheezes, rales or rhonchi, symmetric air entry Heart - normal rate, regular rhythm, normal S1, S2, no murmurs, rubs, clicks or gallops Abdomen - soft, mid abdominal tenderness, no rebound/guarding/peritoneal signs, nondistended, no masses or organomegaly Back exam - full range of motion, no tenderness, palpable spasm or pain on motion Neurological - alert, oriented, normal speech, no focal findings or movement disorder noted Musculoskeletal - no joint tenderness, deformity or swelling Extremities - peripheral pulses normal, no pedal edema, no clubbing or cyanosis Skin - normal coloration and turgor, no rashes, no suspicious skin lesions noted MDM Number of Diagnoses or Management Options Acute pancreatitis, unspecified complication status, unspecified pancreatitis type:  
  
Amount and/or Complexity of Data Reviewed Clinical lab tests: ordered and reviewed Tests in the radiology section of CPT®: ordered and reviewed Discuss the patient with other providers: yes (hospitalist) Independent visualization of images, tracings, or specimens: yes Patient Progress Patient progress: improved Procedures CONSULT NOTE: 
4:15 AM Bharath Medina MD spoke with Dr. Fede Garcia, Consult for Hospitalist.  Discussed available diagnostic tests and clinical findings. Dr. Fede Garcia will evaluate the patient for admission. 4:15 AM 
Patient is being admitted to the hospital.  The results of their tests and reasons for their admission have been discussed with them and/or available family. They convey agreement and understanding for the need to be admitted and for their admission diagnosis. Consultation will be made now with the inpatient physician for hospitalization. Pt with acute pancreatitis, concern for gallstone pancreatitis. Abx, IVF, and admit. Will need further eval for MRCP possible ERCP. Pt afebrile, nontoxic, VSS.

## 2018-11-08 NOTE — ED NOTES
Admission Time Out Check signed & held orders:  [x] Yes or  [] No 
 
Complete Full set of Vitals to include MEWS score 1 Assess Vital Signs over the last 2 hours:  [x] Stable or  [] Unstable Patient Vitals for the past 4 hrs: 
 Temp Pulse Resp BP SpO2  
11/08/18 0519 97.9 °F (36.6 °C) 87 18 140/90 94 % 11/08/18 0230    130/67 90 % 11/08/18 0155 98.2 °F (36.8 °C) 91 20 136/73 97 % Does this patient meet Code Purple criteria or other Core Measure protocol? [] Yes or  [x] No or  [] Already being treated Unit patient assigned to: Medical             
 
Does the patient need any special isolation? []  Yes or  [x] No 
 
Is the assigned unit appropriate? [x] Yes or  [] No 
 
Does the patient need to be transported on a monitor and/or has critical drips? [] Yes or  [x] No or  [] Order obtained to travel without Monitor/nurse Any needs/concerns that need to be addressed prior to admission? (i.e. ED/Admit provider or Nursing Supervisor) 
    [] Yes or  [x] No 
 
Does patient require IV fluid continued on admission?  
                             [x] Yes or  [x] No 
 
 
Penny De Leon RN

## 2018-11-08 NOTE — PROGRESS NOTES
CM Note: 
Reason for Admission:   Pancreatitis; gallstone pancreatitis RRAT Score:     11 Plan for utilizing home health:      none Likelihood of Readmission:  low Transition of Care Plan:                  When medically stable, pt to d/c to his sister's home for a short stay. He will be transported by his sister, Yris Perrin ([phone]). Pt gets his medications from the [facility]. Arleth RN Care Management Interventions PCP Verified by CM: Yes(Patient goes to the [facility].) Palliative Care Criteria Met (RRAT>21 & CHF Dx)?: No 
MyChart Signup: No 
Discharge Durable Medical Equipment: No 
Physical Therapy Consult: No 
Occupational Therapy Consult: No 
Speech Therapy Consult: No 
Current Support Network: Lives Alone(1 story house with 3 entry steps.) Confirm Follow Up Transport: Family(sister) Plan discussed with Pt/Family/Caregiver: Yes Discharge Location Discharge Placement: Home with family assistance

## 2018-11-08 NOTE — H&P
[address] [id number] [facility], [facility] 19 
[phone] Admission History and Physical 
 
 
NAME:  Miguel Dye :   1965 MRN:  [id number] PCP:  Leonel Brown MD  
 
Date/Time:  2018 Subjective: CHIEF COMPLAINT: abdominal pain HISTORY OF PRESENT ILLNESS:    
Mr. Mary Keene is a 48 y.o.  male who is admitted with gallstone pancreatitis. Mr. Mary Keene with PMH of DM, HTN, hyperlipidemia, obesity, presented to ER c/o abdominal pain, which started 2 days ago. The pain stars on RUQ area and later involved lower abdomen. The pain is sharp, moderate to severe in intensity without radiation. Denies nausea or vomiting. Pt denies alcohol use. Pain got worse after eating No past medical history on file. No past surgical history on file. Social History Tobacco Use  Smoking status: Never Smoker  Smokeless tobacco: Never Used Substance Use Topics  Alcohol use: No  
  Alcohol/week: 0.0 oz Family History Family history unknown: Yes No Known Allergies Prior to Admission medications Medication Sig Start Date End Date Taking? Authorizing Provider  
metoprolol tartrate (LOPRESSOR) 25 mg tablet TAKE ONE TABLET BY MOUTH TWICE DAILY FOR HYPERTENSION 18   Carmen GARCIA NP  
metFORMIN ER (GLUCOPHAGE XR) 500 mg tablet TAKE 2 TABLETS DAILY WITH BREAKFAST AND THEN 2 TABLETS DAILY WITH DINNER 18   Carmen GARCIA NP  
triamterene-hydroCHLOROthiazide (MAXZIDE) 37.5-25 mg per tablet TAKE ONE TABLET BY MOUTH ONE TIME DAILY 18   Carmen GARCIA NP  
amLODIPine (NORVASC) 5 mg tablet Take 1 Tab by mouth daily. 18   Nicole Torrez NP  
glipiZIDE (GLUCOTROL) 5 mg tablet 1 po qday 30 min ac dinner 18   Nicole Torrez NP  
lovastatin (MEVACOR) 20 mg tablet Take 2 Tabs by mouth nightly. 4/10/18   Nicole Torrez NP Review of Systems: 
(bold if positive, if negative) Gen:  Eyes:  ENT:  CVS:  Pulm:  GI:  bdominal pain, :   
MS:  Skin:  Psych:  Endo:   
Hem:  Renal:   
Neuro:    
 
 
  
Objective: VITALS:   
Vital signs reviewed; most recent are: 
 
Visit Vitals /67 Pulse 91 Temp 98.2 °F (36.8 °C) Resp 20 Ht 5' 8\" (1.727 m) Wt 110.2 kg (243 lb) SpO2 90% BMI 36.95 kg/m² SpO2 Readings from Last 6 Encounters:  
11/08/18 90% No intake or output data in the 24 hours ending 11/08/18 0438 Exam:  
 
Physical Exam: 
 
Gen:  Well-developed, well-nourished, in no acute distress HEENT:  Pink conjunctivae, PERRL, hearing intact to voice, moist mucous membranes Neck:  Supple, without masses, thyroid non-tender Resp:  No accessory muscle use, clear breath sounds without wheezes rales or rhonchi 
Card:  No murmurs, normal S1, S2 without thrills, bruits or peripheral edema Abd:  Soft, non-tender, non-distended, normoactive bowel sounds are present, no palpable organomegaly and no detectable hernias Lymph:  No cervical or inguinal adenopathy Musc:  No cyanosis or clubbing Skin:  No rashes or ulcers, skin turgor is good Neuro:  Cranial nerves are grossly intact, no focal motor weakness, follows commands appropriately Psych:  Good insight, oriented to person, place and time, alert Labs: 
 
Recent Labs 11/08/18 
0216 WBC 11.2* HGB 16.4 HCT 47.5  Recent Labs 11/08/18 
0216   
K 2.8*  
CL 93* CO2 28 * BUN 9  
CREA 1.67* CA 9.7 ALB 4.1 TBILI 7.6* SGOT 308* * Lab Results Component Value Date/Time Glucose POC  07/10/2018 01:53 PM  
 Glucose  nf 05/14/2018 01:12 PM  
 
No results for input(s): PH, PCO2, PO2, HCO3, FIO2 in the last 72 hours. No results for input(s): INR in the last 72 hours. No lab exists for component: INREXT Telemetry reviewed:   
 
  
Assessment/Plan:   
1. Gallstone pancreatitis (11/8/2018).  CT scan and US of the abdomen was noted. Keep NPO, IVF, pain management. Consult GI and general surgery 2. Abnormal LFTs (11/8/2018)/ Cholelithiasis (11/8/2018). May need cholecystectomy +/- ERCP. 3.  ARF (acute renal failure) ([facility] [address] (11/8/2018). Secondary to IVVD. continue IVF. 4.  Diabetes mellitus type 2, controlled ([facility] [address] (2/16/2013). Hold glipizide and metformin. Cover with SSI 5. Hypokalemia (11/8/2018). replete 6. Hyperlipidemia (2/16/2013). Hold statin 7. Essential hypertension (2/16/2013). Continue amlodipna and metoprolol. Hold maxzide due to ARF 
 
8. Obesity, unspecified (2/16/2013). Would benefit from weigh loss. Previous medical records reviewed Risk of deterioration: high Total time spent with patient: 70 Minutes Care Plan discussed with: Patient, Family, Nursing Staff and >50% of time spent in counseling and coordination of care Discussed:  Care Plan Prophylaxis:  Lovenox Probable Disposition:  Home w/Family 
        
___________________________________________________ Attending Physician: Anya Carrasquillo MD

## 2018-11-08 NOTE — ED NOTES
Bedside and Verbal shift change report given to Kranthi Soler (oncoming nurse) by [address] RN (offgoing nurse). Report included the following information ED Summary.

## 2018-11-09 ENCOUNTER — ANESTHESIA EVENT (OUTPATIENT)
Dept: SURGERY | Age: 53
DRG: 417 | End: 2018-11-09
Payer: SELF-PAY

## 2018-11-09 PROBLEM — E80.6 HYPERBILIRUBINEMIA: Status: ACTIVE | Noted: 2018-11-09

## 2018-11-09 LAB
ALBUMIN SERPL-MCNC: 3 G/DL (ref 3.5–5)
ALBUMIN/GLOB SERPL: 0.8 {RATIO} (ref 1.1–2.2)
ALP SERPL-CCNC: 377 U/L (ref 45–117)
ALT SERPL-CCNC: 539 U/L (ref 12–78)
ANION GAP SERPL CALC-SCNC: 9 MMOL/L (ref 5–15)
AST SERPL-CCNC: 280 U/L (ref 15–37)
BILIRUB DIRECT SERPL-MCNC: 7.9 MG/DL (ref 0–0.2)
BILIRUB SERPL-MCNC: 9.6 MG/DL (ref 0.2–1)
BUN SERPL-MCNC: 13 MG/DL (ref 6–20)
BUN/CREAT SERPL: 9 (ref 12–20)
CALCIUM SERPL-MCNC: 8.3 MG/DL (ref 8.5–10.1)
CHLORIDE SERPL-SCNC: 98 MMOL/L (ref 97–108)
CO2 SERPL-SCNC: 29 MMOL/L (ref 21–32)
CREAT SERPL-MCNC: 1.38 MG/DL (ref 0.7–1.3)
ERYTHROCYTE [DISTWIDTH] IN BLOOD BY AUTOMATED COUNT: 15.3 % (ref 11.5–14.5)
GLOBULIN SER CALC-MCNC: 3.6 G/DL (ref 2–4)
GLUCOSE BLD STRIP.AUTO-MCNC: 219 MG/DL (ref 65–100)
GLUCOSE BLD STRIP.AUTO-MCNC: 250 MG/DL (ref 65–100)
GLUCOSE BLD STRIP.AUTO-MCNC: 260 MG/DL (ref 65–100)
GLUCOSE BLD STRIP.AUTO-MCNC: 266 MG/DL (ref 65–100)
GLUCOSE BLD STRIP.AUTO-MCNC: 304 MG/DL (ref 65–100)
GLUCOSE SERPL-MCNC: 271 MG/DL (ref 65–100)
HCT VFR BLD AUTO: 42.7 % (ref 36.6–50.3)
HGB BLD-MCNC: 15 G/DL (ref 12.1–17)
LIPASE SERPL-CCNC: 2556 U/L (ref 73–393)
MAGNESIUM SERPL-MCNC: 1.3 MG/DL (ref 1.6–2.4)
MCH RBC QN AUTO: 29.8 PG (ref 26–34)
MCHC RBC AUTO-ENTMCNC: 35.1 G/DL (ref 30–36.5)
MCV RBC AUTO: 84.7 FL (ref 80–99)
NRBC # BLD: 0 K/UL (ref 0–0.01)
NRBC BLD-RTO: 0 PER 100 WBC
PLATELET # BLD AUTO: 275 K/UL (ref 150–400)
PMV BLD AUTO: 10.1 FL (ref 8.9–12.9)
POTASSIUM SERPL-SCNC: 3.2 MMOL/L (ref 3.5–5.1)
PROT SERPL-MCNC: 6.6 G/DL (ref 6.4–8.2)
RBC # BLD AUTO: 5.04 M/UL (ref 4.1–5.7)
SERVICE CMNT-IMP: ABNORMAL
SODIUM SERPL-SCNC: 136 MMOL/L (ref 136–145)
WBC # BLD AUTO: 23 K/UL (ref 4.1–11.1)

## 2018-11-09 PROCEDURE — 74011250636 HC RX REV CODE- 250/636: Performed by: INTERNAL MEDICINE

## 2018-11-09 PROCEDURE — 83690 ASSAY OF LIPASE: CPT

## 2018-11-09 PROCEDURE — 65270000029 HC RM PRIVATE

## 2018-11-09 PROCEDURE — 83735 ASSAY OF MAGNESIUM: CPT

## 2018-11-09 PROCEDURE — 0FC98ZZ EXTIRPATION OF MATTER FROM COMMON BILE DUCT, VIA NATURAL OR ARTIFICIAL OPENING ENDOSCOPIC: ICD-10-PCS | Performed by: SPECIALIST

## 2018-11-09 PROCEDURE — 74011636637 HC RX REV CODE- 636/637: Performed by: INTERNAL MEDICINE

## 2018-11-09 PROCEDURE — 80048 BASIC METABOLIC PNL TOTAL CA: CPT

## 2018-11-09 PROCEDURE — 36415 COLL VENOUS BLD VENIPUNCTURE: CPT

## 2018-11-09 PROCEDURE — 74011000258 HC RX REV CODE- 258: Performed by: INTERNAL MEDICINE

## 2018-11-09 PROCEDURE — 80076 HEPATIC FUNCTION PANEL: CPT

## 2018-11-09 PROCEDURE — 74011250637 HC RX REV CODE- 250/637: Performed by: INTERNAL MEDICINE

## 2018-11-09 PROCEDURE — 85027 COMPLETE CBC AUTOMATED: CPT

## 2018-11-09 PROCEDURE — 82962 GLUCOSE BLOOD TEST: CPT

## 2018-11-09 RX ORDER — INSULIN GLARGINE 100 [IU]/ML
20 INJECTION, SOLUTION SUBCUTANEOUS DAILY
Status: DISCONTINUED | OUTPATIENT
Start: 2018-11-09 | End: 2018-11-10

## 2018-11-09 RX ORDER — POTASSIUM CHLORIDE 7.45 MG/ML
10 INJECTION INTRAVENOUS
Status: COMPLETED | OUTPATIENT
Start: 2018-11-09 | End: 2018-11-09

## 2018-11-09 RX ADMIN — POTASSIUM CHLORIDE 10 MEQ: 10 INJECTION, SOLUTION INTRAVENOUS at 11:20

## 2018-11-09 RX ADMIN — Medication 10 ML: at 06:34

## 2018-11-09 RX ADMIN — AMPICILLIN SODIUM AND SULBACTAM SODIUM 3 G: 2; 1 INJECTION, POWDER, FOR SOLUTION INTRAMUSCULAR; INTRAVENOUS at 20:43

## 2018-11-09 RX ADMIN — INSULIN LISPRO 3 UNITS: 100 INJECTION, SOLUTION INTRAVENOUS; SUBCUTANEOUS at 00:28

## 2018-11-09 RX ADMIN — Medication 5 ML: at 14:00

## 2018-11-09 RX ADMIN — INSULIN LISPRO 5 UNITS: 100 INJECTION, SOLUTION INTRAVENOUS; SUBCUTANEOUS at 18:58

## 2018-11-09 RX ADMIN — HYDROMORPHONE HYDROCHLORIDE 1 MG: 2 INJECTION, SOLUTION INTRAMUSCULAR; INTRAVENOUS; SUBCUTANEOUS at 08:47

## 2018-11-09 RX ADMIN — INSULIN LISPRO 7 UNITS: 100 INJECTION, SOLUTION INTRAVENOUS; SUBCUTANEOUS at 11:33

## 2018-11-09 RX ADMIN — SODIUM CHLORIDE, SODIUM LACTATE, POTASSIUM CHLORIDE, AND CALCIUM CHLORIDE 150 ML/HR: 600; 310; 30; 20 INJECTION, SOLUTION INTRAVENOUS at 23:35

## 2018-11-09 RX ADMIN — POTASSIUM CHLORIDE 10 MEQ: 10 INJECTION, SOLUTION INTRAVENOUS at 09:52

## 2018-11-09 RX ADMIN — AMLODIPINE BESYLATE 5 MG: 5 TABLET ORAL at 08:29

## 2018-11-09 RX ADMIN — HEPARIN SODIUM 5000 UNITS: 5000 INJECTION INTRAVENOUS; SUBCUTANEOUS at 15:36

## 2018-11-09 RX ADMIN — HYDROMORPHONE HYDROCHLORIDE 1 MG: 2 INJECTION, SOLUTION INTRAMUSCULAR; INTRAVENOUS; SUBCUTANEOUS at 16:56

## 2018-11-09 RX ADMIN — HYDROMORPHONE HYDROCHLORIDE 1 MG: 2 INJECTION, SOLUTION INTRAMUSCULAR; INTRAVENOUS; SUBCUTANEOUS at 23:08

## 2018-11-09 RX ADMIN — POTASSIUM CHLORIDE 10 MEQ: 10 INJECTION, SOLUTION INTRAVENOUS at 08:34

## 2018-11-09 RX ADMIN — Medication 10 ML: at 23:09

## 2018-11-09 RX ADMIN — POTASSIUM CHLORIDE 10 MEQ: 10 INJECTION, SOLUTION INTRAVENOUS at 08:30

## 2018-11-09 RX ADMIN — AMPICILLIN SODIUM AND SULBACTAM SODIUM 3 G: 2; 1 INJECTION, POWDER, FOR SOLUTION INTRAMUSCULAR; INTRAVENOUS at 16:49

## 2018-11-09 RX ADMIN — METOPROLOL TARTRATE 25 MG: 25 TABLET ORAL at 08:29

## 2018-11-09 RX ADMIN — HYDROMORPHONE HYDROCHLORIDE 1 MG: 2 INJECTION, SOLUTION INTRAMUSCULAR; INTRAVENOUS; SUBCUTANEOUS at 02:12

## 2018-11-09 RX ADMIN — INSULIN LISPRO 5 UNITS: 100 INJECTION, SOLUTION INTRAVENOUS; SUBCUTANEOUS at 06:33

## 2018-11-09 RX ADMIN — METOPROLOL TARTRATE 25 MG: 25 TABLET ORAL at 20:43

## 2018-11-09 RX ADMIN — INSULIN GLARGINE 20 UNITS: 100 INJECTION, SOLUTION SUBCUTANEOUS at 11:38

## 2018-11-09 RX ADMIN — SODIUM CHLORIDE, SODIUM LACTATE, POTASSIUM CHLORIDE, AND CALCIUM CHLORIDE 150 ML/HR: 600; 310; 30; 20 INJECTION, SOLUTION INTRAVENOUS at 08:28

## 2018-11-09 RX ADMIN — INSULIN LISPRO 3 UNITS: 100 INJECTION, SOLUTION INTRAVENOUS; SUBCUTANEOUS at 23:45

## 2018-11-09 NOTE — ROUTINE PROCESS
Bedside and Verbal shift change report given to Gonsalo Osborne rn (oncoming nurse) by Severa Auer (offgoing nurse). Report included the following information SBAR, Kardex, Procedure Summary, Intake/Output, MAR and Recent Results.

## 2018-11-09 NOTE — PROGRESS NOTES
[facility] Progress Note NAME: Margarita Mccloud :  1965 MRM:  [id number] Date/Time: 2018  10:32 AM 
 
  
Assessment and Plan:  
 
Gallstone pancreatitis / Abnormal LFTs / Cholelithiasis / Hyperbilirubinemia - POA, new. MRCP shows stone. ERCP delayed, awaiting attempt to find a endoscopy GI MD.  Cholecystectomy after that. NPO for now. Pain and nausea control. IVF. LFTs a bit better, bili a bit worse. Sepsis / Sinus tachycardia / Leukocytosis - Developed after admission. No fever. Might be non infectious, but will start Unasyn for potential developing ascending cholangitis. Diabetes mellitus type 2, controlled - Diabetic diet and counseling when eating. SSI per protocol. Start Lantus due to persistently elevated BG while NPO. Check A1c. Essential hypertension - Continue amlodipine and metoprolol ARF (acute renal failure) / Hypokalemia - POA due to poor PO intake. Hydrate and replete lytes. Obesity, unspecified - Advise weight loss. Other hyperlipidemia - hold statin Subjective: Chief Complaint:  Abd pain persists. ROS: 
(bold if positive, if negative) Abd Pain Tolerating PT   NPO Objective:  
 
Last 24hrs VS reviewed since prior progress note. Most recent are: 
 
Visit Vitals BP (!) 146/94 (BP 1 Location: Right arm, BP Patient Position: At rest) Pulse (!) 114 Temp 99 °F (37.2 °C) Resp 14 Ht 5' 8\" (1.727 m) Wt 110.2 kg (243 lb) SpO2 92% BMI 36.95 kg/m² SpO2 Readings from Last 6 Encounters:  
18 92% Intake/Output Summary (Last 24 hours) at 2018 1032 Last data filed at 2018 9899 Gross per 24 hour Intake 4070 ml Output  Net 4070 ml Physical Exam: 
 
Gen:  Obese, in no acute distress HEENT:  Pink conjunctivae, PERRL, hearing intact to voice, moist mucous membranes Neck:  Supple, without masses, thyroid non-tender Resp:  No accessory muscle use, clear breath sounds without wheezes rales or rhonchi 
Card:  No murmurs, tachycardic S1, S2 without thrills, bruits or peripheral edema Abd:  Soft, non-tender, non-distended, normoactive bowel sounds are present, no palpable organomegaly and no detectable hernias Lymph:  No cervical or inguinal adenopathy Musc:  No cyanosis or clubbing Skin:  No rashes or ulcers, skin turgor is good Neuro:  Cranial nerves are grossly intact, no focal motor weakness, follows commands appropriately Psych:  Good insight, oriented to person, place and time, alert Telemetry reviewed:   normal sinus rhythm 
__________________________________________________________________ Medications Reviewed: (see below) Medications:  
 
Current Facility-Administered Medications Medication Dose Route Frequency  potassium chloride 10 mEq in 100 ml IVPB  10 mEq IntraVENous Q1H  
 amLODIPine (NORVASC) tablet 5 mg  5 mg Oral DAILY  metoprolol tartrate (LOPRESSOR) tablet 25 mg  25 mg Oral Q12H  
 sodium chloride (NS) flush 5-10 mL  5-10 mL IntraVENous Q8H  
 sodium chloride (NS) flush 5-10 mL  5-10 mL IntraVENous PRN  
 heparin (porcine) injection 5,000 Units  5,000 Units SubCUTAneous Q8H  
 ondansetron (ZOFRAN) injection 4 mg  4 mg IntraVENous Q4H PRN  
 lactated Ringers infusion  150 mL/hr IntraVENous CONTINUOUS  
 insulin lispro (HUMALOG) injection   SubCUTAneous Q6H  
 glucose chewable tablet 16 g  4 Tab Oral PRN  
 dextrose (D50W) injection syrg 12.5-25 g  12.5-25 g IntraVENous PRN  
 glucagon (GLUCAGEN) injection 1 mg  1 mg IntraMUSCular PRN  
 influenza vaccine 2018-19 (6 mos+)(PF) (FLUARIX QUAD/FLULAVAL QUAD) injection 0.5 mL  0.5 mL IntraMUSCular PRIOR TO DISCHARGE  
 HYDROmorphone (PF) (DILAUDID) injection 1 mg  1 mg IntraVENous Q4H PRN Lab Data Reviewed: (see below) Lab Review:  
 
Recent Labs 11/09/18 
0157 11/08/18 
0216 WBC 23.0* 11.2* HGB 15.0 16.4 HCT 42.7 47.5  358 Recent Labs 11/09/18 
0157 11/08/18 
0216  136  
K 3.2* 2.8*  
CL 98 93* CO2 29 28 * 303* BUN 13 9 CREA 1.38* 1.67* CA 8.3* 9.7 MG 1.3* 1.5* ALB 3.0* 4.1 TBILI 9.6* 7.6* SGOT 280* 308* * 711* Lab Results Component Value Date/Time Glucose (POC) 250 (H) 11/09/2018 06:18 AM  
 Glucose (POC) 260 (H) 11/09/2018 12:11 AM  
 Glucose (POC) 240 (H) 11/08/2018 09:05 PM  
 Glucose (POC) 245 (H) 11/08/2018 05:10 PM  
 Glucose (POC) 258 (H) 11/08/2018 12:28 PM  
 
No results for input(s): PH, PCO2, PO2, HCO3, FIO2 in the last 72 hours. No results for input(s): INR in the last 72 hours. No lab exists for component: INREXT All Micro Results None I have reviewed notes of prior 24hr. Other pertinent lab: none Total time spent with patient: 45 Minutes Care Plan discussed with: Patient, Care Manager, Nursing Staff, Consultant/Specialist and >50% of time spent in counseling and coordination of care Discussed:  Care Plan Prophylaxis:  SCD's and H2B/PPI Disposition:  Home w/Family 
        
___________________________________________________ Attending Physician: Jose C Reece MD

## 2018-11-09 NOTE — PROGRESS NOTES
26 Covering for current nurse on lunch. Patient's family wanted to talk to the physician that will be completing the ERCP to understand why they won't be doing the surgery today. Notified them that the physician is on vacation and is the only one that can do the procedure. The family is concerned asking if it is okay for the patient to wait that long and if not then why can't they be transferred to another hospital to get the procedure done. Notified Dr. Yomaira Avendaño who stated we need to call Dr. Olga Gore because he is the physician who will decide if he can wait that long, call in a doctor, or transfer the patient. Notified the nurse and she will contact Dr. Olga Gore for more clarification. Notified the family who have no additional needs at this time.

## 2018-11-09 NOTE — PROGRESS NOTES
Gastrointestinal Progress Note 
 
11/9/2018 Admit Date: 11/8/2018 Subjective:  
 
New Complaints Today: None. Continues to have upper abdominal pain. Controlled with pain medication. Denies nausea or vomiting. Current Facility-Administered Medications Medication Dose Route Frequency  potassium chloride 10 mEq in 100 ml IVPB  10 mEq IntraVENous Q1H  
 amLODIPine (NORVASC) tablet 5 mg  5 mg Oral DAILY  metoprolol tartrate (LOPRESSOR) tablet 25 mg  25 mg Oral Q12H  
 sodium chloride (NS) flush 5-10 mL  5-10 mL IntraVENous Q8H  
 sodium chloride (NS) flush 5-10 mL  5-10 mL IntraVENous PRN  
 heparin (porcine) injection 5,000 Units  5,000 Units SubCUTAneous Q8H  
 ondansetron (ZOFRAN) injection 4 mg  4 mg IntraVENous Q4H PRN  
 lactated Ringers infusion  150 mL/hr IntraVENous CONTINUOUS  
 insulin lispro (HUMALOG) injection   SubCUTAneous Q6H  
 glucose chewable tablet 16 g  4 Tab Oral PRN  
 dextrose (D50W) injection syrg 12.5-25 g  12.5-25 g IntraVENous PRN  
 glucagon (GLUCAGEN) injection 1 mg  1 mg IntraMUSCular PRN  
 influenza vaccine 2018-19 (6 mos+)(PF) (FLUARIX QUAD/FLULAVAL QUAD) injection 0.5 mL  0.5 mL IntraMUSCular PRIOR TO DISCHARGE  
 HYDROmorphone (PF) (DILAUDID) injection 1 mg  1 mg IntraVENous Q4H PRN Objective:  
 
Blood pressure (!) 146/94, pulse (!) 114, temperature 99 °F (37.2 °C), resp. rate 14, height 5' 8\" (1.727 m), weight 110.2 kg (243 lb), SpO2 92 %. 11/09 0701 - 11/09 1900 In: 1860 [I.V.:1860] Out: -  
 
11/07 1901 - 11/09 0700 In: 7022 [I.V.:2210] Out: - EXAM:  GENERAL: alert, cooperative, no distress, HEART: regular rate and rhythm, S1, S2 normal, no murmur, click, rub or gallop, LUNGS: chest clear, no wheezing, rales, normal symmetric air entry, ABDOMEN: soft, nondistended, nontender and EXTREMITY: extremities normal, atraumatic, no cyanosis or edema Data Review Recent Results (from the past 24 hour(s)) GLUCOSE, POC  
 Collection Time: 11/08/18 12:28 PM  
Result Value Ref Range Glucose (POC) 258 (H) 65 - 100 mg/dL Performed by Umang Pacheco (PCT) GLUCOSE, POC Collection Time: 11/08/18  5:10 PM  
Result Value Ref Range Glucose (POC) 245 (H) 65 - 100 mg/dL Performed by Umang Pacheco (PCT) GLUCOSE, POC Collection Time: 11/08/18  9:05 PM  
Result Value Ref Range Glucose (POC) 240 (H) 65 - 100 mg/dL Performed by Maribel Mina (PCT) GLUCOSE, POC Collection Time: 11/09/18 12:11 AM  
Result Value Ref Range Glucose (POC) 260 (H) 65 - 100 mg/dL Performed by Maribel Mina (PCT) METABOLIC PANEL, BASIC Collection Time: 11/09/18  1:57 AM  
Result Value Ref Range Sodium 136 136 - 145 mmol/L Potassium 3.2 (L) 3.5 - 5.1 mmol/L Chloride 98 97 - 108 mmol/L  
 CO2 29 21 - 32 mmol/L Anion gap 9 5 - 15 mmol/L Glucose 271 (H) 65 - 100 mg/dL BUN 13 6 - 20 MG/DL Creatinine 1.38 (H) 0.70 - 1.30 MG/DL  
 BUN/Creatinine ratio 9 (L) 12 - 20 GFR est AA >60 >60 ml/min/1.73m2 GFR est non-AA 54 (L) >60 ml/min/1.73m2 Calcium 8.3 (L) 8.5 - 10.1 MG/DL  
CBC W/O DIFF Collection Time: 11/09/18  1:57 AM  
Result Value Ref Range WBC 23.0 (H) 4.1 - 11.1 K/uL  
 RBC 5.04 4.10 - 5.70 M/uL  
 HGB 15.0 12.1 - 17.0 g/dL HCT 42.7 36.6 - 50.3 % MCV 84.7 80.0 - 99.0 FL  
 MCH 29.8 26.0 - 34.0 PG  
 MCHC 35.1 30.0 - 36.5 g/dL  
 RDW 15.3 (H) 11.5 - 14.5 % PLATELET 616 230 - 519 K/uL MPV 10.1 8.9 - 12.9 FL  
 NRBC 0.0 0  WBC ABSOLUTE NRBC 0.00 0.00 - 0.01 K/uL HEPATIC FUNCTION PANEL Collection Time: 11/09/18  1:57 AM  
Result Value Ref Range Protein, total 6.6 6.4 - 8.2 g/dL Albumin 3.0 (L) 3.5 - 5.0 g/dL Globulin 3.6 2.0 - 4.0 g/dL A-G Ratio 0.8 (L) 1.1 - 2.2 Bilirubin, total 9.6 (H) 0.2 - 1.0 MG/DL Bilirubin, direct 7.9 (H) 0.0 - 0.2 MG/DL Alk.  phosphatase 377 (H) 45 - 117 U/L  
 AST (SGOT) 280 (H) 15 - 37 U/L  
 ALT (SGPT) 539 (H) 12 - 78 U/L  
 LIPASE Collection Time: 11/09/18  1:57 AM  
Result Value Ref Range Lipase 2,556 (H) 73 - 393 U/L MAGNESIUM Collection Time: 11/09/18  1:57 AM  
Result Value Ref Range Magnesium 1.3 (L) 1.6 - 2.4 mg/dL GLUCOSE, POC Collection Time: 11/09/18  6:18 AM  
Result Value Ref Range Glucose (POC) 250 (H) 65 - 100 mg/dL Performed by Kuldeep Chester (PCT) Assessment:  
 
Active Problems: 
  Diabetes mellitus type 2, controlled ([facility] [address] (2/16/2013) Obesity, unspecified (2/16/2013) Other hyperlipidemia (2/16/2013) Essential hypertension (2/16/2013) Pancreatitis (11/8/2018) Hypokalemia (11/8/2018) ARF (acute renal failure) ([facility] [address] (11/8/2018) Abnormal LFTs (11/8/2018) Cholelithiasis (11/8/2018) Gallstone pancreatitis (11/8/2018) Plan:  
 
Plan discussed with attending Dr. Brittany John 1. ERCP indicated for choledocholithiasis (timing to be determined - need endoscopist that performs procedure) 2. Continue with supportive care NPO except small amount of ice chips, IV fluids, prn analgesics and antiemetics 3. Repeat labs in AM 
4. Following Tisha Curry PA-C 
11/09/18 
9:22 AM 
 
Has remained comfortable since I saw him over 10 hours ago. Currently ERCP endoscopist is occupied at [facility] likely through 10 PM today. As he remains comfortable, will arrange ERCP for tomorrow. I have discussed possible ERCP, sphincterotomy, stone extraction, stent placement biopsy, alternatives, potential complications including but not limited to pancreatitis, bleeding, perforation requiring operative repair and/or blood transfusions. All questions answered. I have interviewed and examined patient with addendum to note above and formulation care plan to reflect my evaluation Aure Krer M.D.

## 2018-11-09 NOTE — PROGRESS NOTES
Bedside and Verbal shift change report given to Sue Petty (oncoming nurse) by Versa Schirmer (offgoing nurse). Report included the following information SBAR, Kardex, Intake/Output, MAR, Accordion and Recent Results.

## 2018-11-09 NOTE — PROGRESS NOTES
General Surgery Progress Note S: Continues to have upper abdominal pain requiring Dilaudid. MRCP demonstrated 3-4 intra-luminal filling defects of the distal common bile duct c/w choledocholithiasis. Bilirubin trending up, 9.6 today. Patient Vitals for the past 24 hrs: 
 Temp Pulse Resp BP SpO2  
11/09/18 0627 99.1 °F (37.3 °C) (!) 110 18 (!) 152/97 94 % 11/08/18 2338 99.4 °F (37.4 °C) 97 18 (!) 148/92 91 % 11/08/18 1941 99.8 °F (37.7 °C) (!) 104 18 144/89 92 % 11/08/18 1707 99.8 °F (37.7 °C) (!) 105 18 (!) 151/96 93 % 11/08/18 1224 98.7 °F (37.1 °C) 84 18 (!) 160/94 94 % 11/08/18 0754 98.6 °F (37 °C) 88 18 148/90 92 % Date 11/08/18 0700 - 11/09/18 0322 11/09/18 0700 - 11/10/18 6536 Shift 6403-5102 4475-7188 24 Hour Total 2344-5458 1072-2288 24 Hour Total  
INTAKE  
I.V.(mL/kg/hr)  2210 2210 Volume (lactated Ringers infusion)  2210 2210 Shift Total(mL/kg)  1927(62.0) 6485(37.7) OUTPUT Shift Total(mL/kg) NET  6957 0999 Weight (kg) 110.2 110.2 110.2 110.2 110.2 110.2 Physical Exam: 
 
 
General: NAD, A&Ox3 Eyes: + Scleral icterus Resp: non-labored CV: RRR Abdomen: soft, mildly tender in the epigastric region, moderately distended Extremity: no edema Lab Results Component Value Date/Time WBC 23.0 (H) 11/09/2018 01:57 AM  
 HGB 15.0 11/09/2018 01:57 AM  
 HCT 42.7 11/09/2018 01:57 AM  
 PLATELET 020 95/55/6285 01:57 AM  
 MCV 84.7 11/09/2018 01:57 AM  
 
 
Lab Results Component Value Date/Time  Sodium 136 11/09/2018 01:57 AM  
 Potassium 3.2 (L) 11/09/2018 01:57 AM  
 Chloride 98 11/09/2018 01:57 AM  
 CO2 29 11/09/2018 01:57 AM  
 Anion gap 9 11/09/2018 01:57 AM  
 Glucose 271 (H) 11/09/2018 01:57 AM  
 BUN 13 11/09/2018 01:57 AM  
 Creatinine 1.38 (H) 11/09/2018 01:57 AM  
 BUN/Creatinine ratio 9 (L) 11/09/2018 01:57 AM  
 GFR est AA >60 11/09/2018 01:57 AM  
 GFR est non-AA 54 (L) 11/09/2018 01:57 AM  
 Calcium 8.3 (L) 11/09/2018 01:57 AM  
 
  
Lab Results Component Value Date/Time ALT (SGPT) 539 (H) 11/09/2018 01:57 AM  
 AST (SGOT) 280 (H) 11/09/2018 01:57 AM  
 Alk. phosphatase 377 (H) 11/09/2018 01:57 AM  
 Bilirubin, direct 7.9 (H) 11/09/2018 01:57 AM  
 Bilirubin, total 9.6 (H) 11/09/2018 01:57 AM  
 
 
No results found for: INR, PTMR, PTP, PT1, PT2 
 
 
 
A/P: 
48year-old male with gallstone pancreatitis, MRCP with choledocholithiasis and worsening hyperbilirubinemia, increased leukocytosis likely SIRS response. Recommend ERCP prior to laparoscopic cholecystectomy Hypokalemia, management per primary team 
NPO Continue supportive care for pancreatitis Davina Morris MD

## 2018-11-09 NOTE — PROGRESS NOTES
[address] Dosing Services: Antimicrobial Stewardship Daily Doc 11/9/2018 Consult for antibiotic dosing of Unasy by Dr. Stiven Osei Indication: Empiric for intra-abdominal infection in patient with pancreatitis (potential developing ascending cholangitis) Day of Therapy 1 Ht Readings from Last 1 Encounters:  
11/08/18 172.7 cm (68\") Wt Readings from Last 1 Encounters:  
11/08/18 110.2 kg (243 lb) Non-Kinetic Antimicrobial Dosing Regimen:  
Current Regimen:  Unasyn- Pharmacy to dose Recommendation: Discussed with Dr. Stiven Osei, unasyn 3 grams Q6 hours ordered for CrCl >30 ml/min Dose administration notes:   Doses given appropriately as scheduled Other Antimicrobial  
(not dosed by pharmacist) None Cultures None ordered Serum Creatinine Lab Results Component Value Date/Time Creatinine 1.38 (H) 11/09/2018 01:57 AM  
  
  
Creatinine Clearance Estimated Creatinine Clearance: 74.5 mL/min (A) (based on SCr of 1.38 mg/dL (H)). Temp Temp: 99.2 °F (37.3 °C) WBC Lab Results Component Value Date/Time WBC 23.0 (H) 11/09/2018 01:57 AM  
 
  
H/H Lab Results Component Value Date/Time HGB 15.0 11/09/2018 01:57 AM  
 
  
Platelets Lab Results Component Value Date/Time PLATELET 667 68/06/6704 01:57 AM  
 
  
 
Pharmacist JO VuD Contact information: [phone]

## 2018-11-09 NOTE — DIABETES MGMT
DTC Progress Note Recommendations/ Comments: Noted home meds currently on hold. If appropriate, please consider adding Lantus 15 units daily to aid in glucose control until able to resume home meds. Pt -280mg/dL and has required 21 units of correction over the past 24 hours. Current hospital DM medication:  
-Humalog normal sensitivity correction Chart reviewed on [facility]. Patient is a 48 y.o. male with known history of Type 2 Diabetes on Metformin ER 1,000mg bid with meals and Glipizide 5mg once daily with dinner at home. A1c:  
Lab Results Component Value Date/Time Hemoglobin A1c 6.9 (H) 07/10/2018 01:45 PM  
 Hemoglobin A1c 8.0 (H) 04/10/2018 01:42 PM  
 
 
Recent Glucose Results:  
Lab Results Component Value Date/Time  (H) 11/09/2018 01:57 AM  
 GLUCPOC 250 (H) 11/09/2018 06:18 AM  
 GLUCPOC 260 (H) 11/09/2018 12:11 AM  
 GLUCPOC 240 (H) 11/08/2018 09:05 PM  
  
 
Lab Results Component Value Date/Time Creatinine 1.38 (H) 11/09/2018 01:57 AM  
 
Estimated Creatinine Clearance: 74.5 mL/min (A) (based on SCr of 1.38 mg/dL (H)). Active Orders Diet DIET NPO With [employer] PO intake: No data found. Will continue to follow as needed. Thank you Anh Brooks RD, CDE [facility] Office: [phone] Pager: [phone]

## 2018-11-10 ENCOUNTER — ANESTHESIA (OUTPATIENT)
Dept: SURGERY | Age: 53
DRG: 417 | End: 2018-11-10
Payer: SELF-PAY

## 2018-11-10 ENCOUNTER — APPOINTMENT (OUTPATIENT)
Dept: GENERAL RADIOLOGY | Age: 53
DRG: 417 | End: 2018-11-10
Attending: SPECIALIST
Payer: SELF-PAY

## 2018-11-10 LAB
ALBUMIN SERPL-MCNC: 2.7 G/DL (ref 3.5–5)
ALBUMIN/GLOB SERPL: 0.7 {RATIO} (ref 1.1–2.2)
ALP SERPL-CCNC: 336 U/L (ref 45–117)
ALT SERPL-CCNC: 360 U/L (ref 12–78)
ANION GAP SERPL CALC-SCNC: 7 MMOL/L (ref 5–15)
AST SERPL-CCNC: 141 U/L (ref 15–37)
BILIRUB SERPL-MCNC: 10.9 MG/DL (ref 0.2–1)
BUN SERPL-MCNC: 17 MG/DL (ref 6–20)
BUN/CREAT SERPL: 13 (ref 12–20)
CALCIUM SERPL-MCNC: 8.3 MG/DL (ref 8.5–10.1)
CHLORIDE SERPL-SCNC: 100 MMOL/L (ref 97–108)
CO2 SERPL-SCNC: 33 MMOL/L (ref 21–32)
CREAT SERPL-MCNC: 1.33 MG/DL (ref 0.7–1.3)
ERYTHROCYTE [DISTWIDTH] IN BLOOD BY AUTOMATED COUNT: 15.8 % (ref 11.5–14.5)
GLOBULIN SER CALC-MCNC: 3.9 G/DL (ref 2–4)
GLUCOSE BLD STRIP.AUTO-MCNC: 202 MG/DL (ref 65–100)
GLUCOSE BLD STRIP.AUTO-MCNC: 224 MG/DL (ref 65–100)
GLUCOSE SERPL-MCNC: 219 MG/DL (ref 65–100)
HCT VFR BLD AUTO: 40.9 % (ref 36.6–50.3)
HGB BLD-MCNC: 14.1 G/DL (ref 12.1–17)
MAGNESIUM SERPL-MCNC: 1.8 MG/DL (ref 1.6–2.4)
MCH RBC QN AUTO: 29.7 PG (ref 26–34)
MCHC RBC AUTO-ENTMCNC: 34.5 G/DL (ref 30–36.5)
MCV RBC AUTO: 86.1 FL (ref 80–99)
NRBC # BLD: 0 K/UL (ref 0–0.01)
NRBC BLD-RTO: 0 PER 100 WBC
PHOSPHATE SERPL-MCNC: 1.3 MG/DL (ref 2.6–4.7)
PLATELET # BLD AUTO: 244 K/UL (ref 150–400)
PMV BLD AUTO: 10.7 FL (ref 8.9–12.9)
POTASSIUM SERPL-SCNC: 3.2 MMOL/L (ref 3.5–5.1)
PROT SERPL-MCNC: 6.6 G/DL (ref 6.4–8.2)
RBC # BLD AUTO: 4.75 M/UL (ref 4.1–5.7)
SERVICE CMNT-IMP: ABNORMAL
SERVICE CMNT-IMP: ABNORMAL
SODIUM SERPL-SCNC: 140 MMOL/L (ref 136–145)
WBC # BLD AUTO: 22.3 K/UL (ref 4.1–11.1)

## 2018-11-10 PROCEDURE — 74011636637 HC RX REV CODE- 636/637: Performed by: INTERNAL MEDICINE

## 2018-11-10 PROCEDURE — 77030018846 HC SOL IRR STRL H20 ICUM -A: Performed by: SPECIALIST

## 2018-11-10 PROCEDURE — 74011000258 HC RX REV CODE- 258: Performed by: INTERNAL MEDICINE

## 2018-11-10 PROCEDURE — 77030011761 HC SPHNTOM BILI BSC -C: Performed by: SPECIALIST

## 2018-11-10 PROCEDURE — 80053 COMPREHEN METABOLIC PANEL: CPT

## 2018-11-10 PROCEDURE — 74011250637 HC RX REV CODE- 250/637: Performed by: INTERNAL MEDICINE

## 2018-11-10 PROCEDURE — 82962 GLUCOSE BLOOD TEST: CPT

## 2018-11-10 PROCEDURE — 84100 ASSAY OF PHOSPHORUS: CPT

## 2018-11-10 PROCEDURE — 76010000138 HC OR TIME 0.5 TO 1 HR: Performed by: SPECIALIST

## 2018-11-10 PROCEDURE — 77030007288 HC DEV LOK BILI BSC -A: Performed by: SPECIALIST

## 2018-11-10 PROCEDURE — 74011636320 HC RX REV CODE- 636/320

## 2018-11-10 PROCEDURE — 77030010803: Performed by: SPECIALIST

## 2018-11-10 PROCEDURE — 74011000250 HC RX REV CODE- 250

## 2018-11-10 PROCEDURE — 74011250636 HC RX REV CODE- 250/636: Performed by: INTERNAL MEDICINE

## 2018-11-10 PROCEDURE — 74011250636 HC RX REV CODE- 250/636

## 2018-11-10 PROCEDURE — 85027 COMPLETE CBC AUTOMATED: CPT

## 2018-11-10 PROCEDURE — 65270000029 HC RM PRIVATE

## 2018-11-10 PROCEDURE — 77030026438 HC STYL ET INTUB CARD -A: Performed by: NURSE ANESTHETIST, CERTIFIED REGISTERED

## 2018-11-10 PROCEDURE — 77030012596 HC SPHNTOM BILI BSC -E: Performed by: SPECIALIST

## 2018-11-10 PROCEDURE — 76000 FLUOROSCOPY <1 HR PHYS/QHP: CPT

## 2018-11-10 PROCEDURE — 77030008684 HC TU ET CUF COVD -B: Performed by: NURSE ANESTHETIST, CERTIFIED REGISTERED

## 2018-11-10 PROCEDURE — 36415 COLL VENOUS BLD VENIPUNCTURE: CPT

## 2018-11-10 PROCEDURE — 77030009038 HC CATH BILI STN RTVR BSC -C: Performed by: SPECIALIST

## 2018-11-10 PROCEDURE — 77030036933 HC BRSH RX CYTO WREGD BSC -C: Performed by: SPECIALIST

## 2018-11-10 PROCEDURE — C1769 GUIDE WIRE: HCPCS | Performed by: SPECIALIST

## 2018-11-10 PROCEDURE — 74011250637 HC RX REV CODE- 250/637: Performed by: SPECIALIST

## 2018-11-10 PROCEDURE — 76060000032 HC ANESTHESIA 0.5 TO 1 HR: Performed by: SPECIALIST

## 2018-11-10 PROCEDURE — 83735 ASSAY OF MAGNESIUM: CPT

## 2018-11-10 PROCEDURE — 74011636320 HC RX REV CODE- 636/320: Performed by: SPECIALIST

## 2018-11-10 PROCEDURE — 76210000016 HC OR PH I REC 1 TO 1.5 HR: Performed by: SPECIALIST

## 2018-11-10 PROCEDURE — 77030037186 HC VLV ENDOSC STRL DEFENDO DISP MVAT -A: Performed by: SPECIALIST

## 2018-11-10 RX ORDER — LIDOCAINE HYDROCHLORIDE 10 MG/ML
0.1 INJECTION, SOLUTION EPIDURAL; INFILTRATION; INTRACAUDAL; PERINEURAL AS NEEDED
Status: DISCONTINUED | OUTPATIENT
Start: 2018-11-10 | End: 2018-11-10 | Stop reason: HOSPADM

## 2018-11-10 RX ORDER — INSULIN GLARGINE 100 [IU]/ML
30 INJECTION, SOLUTION SUBCUTANEOUS DAILY
Status: DISCONTINUED | OUTPATIENT
Start: 2018-11-11 | End: 2018-11-13

## 2018-11-10 RX ORDER — MIDAZOLAM HYDROCHLORIDE 1 MG/ML
.25-5 INJECTION, SOLUTION INTRAMUSCULAR; INTRAVENOUS
Status: DISCONTINUED | OUTPATIENT
Start: 2018-11-10 | End: 2018-11-12

## 2018-11-10 RX ORDER — SODIUM CHLORIDE 0.9 % (FLUSH) 0.9 %
5-10 SYRINGE (ML) INJECTION AS NEEDED
Status: DISCONTINUED | OUTPATIENT
Start: 2018-11-10 | End: 2018-11-10 | Stop reason: HOSPADM

## 2018-11-10 RX ORDER — GLYCOPYRROLATE 0.2 MG/ML
INJECTION INTRAMUSCULAR; INTRAVENOUS AS NEEDED
Status: DISCONTINUED | OUTPATIENT
Start: 2018-11-10 | End: 2018-11-10 | Stop reason: HOSPADM

## 2018-11-10 RX ORDER — SODIUM CHLORIDE 9 MG/ML
50 INJECTION, SOLUTION INTRAVENOUS CONTINUOUS
Status: DISCONTINUED | OUTPATIENT
Start: 2018-11-10 | End: 2018-11-10

## 2018-11-10 RX ORDER — SODIUM CHLORIDE, SODIUM LACTATE, POTASSIUM CHLORIDE, CALCIUM CHLORIDE 600; 310; 30; 20 MG/100ML; MG/100ML; MG/100ML; MG/100ML
125 INJECTION, SOLUTION INTRAVENOUS CONTINUOUS
Status: DISCONTINUED | OUTPATIENT
Start: 2018-11-10 | End: 2018-11-10 | Stop reason: HOSPADM

## 2018-11-10 RX ORDER — SODIUM CHLORIDE, SODIUM LACTATE, POTASSIUM CHLORIDE, CALCIUM CHLORIDE 600; 310; 30; 20 MG/100ML; MG/100ML; MG/100ML; MG/100ML
INJECTION, SOLUTION INTRAVENOUS
Status: DISCONTINUED | OUTPATIENT
Start: 2018-11-10 | End: 2018-11-10 | Stop reason: HOSPADM

## 2018-11-10 RX ORDER — NALOXONE HYDROCHLORIDE 0.4 MG/ML
0.4 INJECTION, SOLUTION INTRAMUSCULAR; INTRAVENOUS; SUBCUTANEOUS
Status: ACTIVE | OUTPATIENT
Start: 2018-11-10 | End: 2018-11-10

## 2018-11-10 RX ORDER — DEXTROMETHORPHAN/PSEUDOEPHED 2.5-7.5/.8
1.2 DROPS ORAL
Status: DISCONTINUED | OUTPATIENT
Start: 2018-11-10 | End: 2018-11-12

## 2018-11-10 RX ORDER — HYDROMORPHONE HYDROCHLORIDE 2 MG/ML
0.5 INJECTION, SOLUTION INTRAMUSCULAR; INTRAVENOUS; SUBCUTANEOUS
Status: DISCONTINUED | OUTPATIENT
Start: 2018-11-10 | End: 2018-11-10 | Stop reason: HOSPADM

## 2018-11-10 RX ORDER — PROPOFOL 10 MG/ML
INJECTION, EMULSION INTRAVENOUS AS NEEDED
Status: DISCONTINUED | OUTPATIENT
Start: 2018-11-10 | End: 2018-11-10 | Stop reason: HOSPADM

## 2018-11-10 RX ORDER — NEOSTIGMINE METHYLSULFATE 1 MG/ML
INJECTION INTRAVENOUS AS NEEDED
Status: DISCONTINUED | OUTPATIENT
Start: 2018-11-10 | End: 2018-11-10 | Stop reason: HOSPADM

## 2018-11-10 RX ORDER — POTASSIUM CHLORIDE 7.45 MG/ML
10 INJECTION INTRAVENOUS
Status: COMPLETED | OUTPATIENT
Start: 2018-11-10 | End: 2018-11-10

## 2018-11-10 RX ORDER — POTASSIUM CHLORIDE AND SODIUM CHLORIDE 450; 150 MG/100ML; MG/100ML
INJECTION, SOLUTION INTRAVENOUS CONTINUOUS
Status: DISCONTINUED | OUTPATIENT
Start: 2018-11-10 | End: 2018-11-12

## 2018-11-10 RX ORDER — MIDAZOLAM HYDROCHLORIDE 1 MG/ML
INJECTION, SOLUTION INTRAMUSCULAR; INTRAVENOUS AS NEEDED
Status: DISCONTINUED | OUTPATIENT
Start: 2018-11-10 | End: 2018-11-10 | Stop reason: HOSPADM

## 2018-11-10 RX ORDER — FLUMAZENIL 0.1 MG/ML
0.2 INJECTION INTRAVENOUS
Status: ACTIVE | OUTPATIENT
Start: 2018-11-10 | End: 2018-11-10

## 2018-11-10 RX ORDER — SUCCINYLCHOLINE CHLORIDE 20 MG/ML
INJECTION INTRAMUSCULAR; INTRAVENOUS AS NEEDED
Status: DISCONTINUED | OUTPATIENT
Start: 2018-11-10 | End: 2018-11-10 | Stop reason: HOSPADM

## 2018-11-10 RX ORDER — DIPHENHYDRAMINE HYDROCHLORIDE 50 MG/ML
12.5 INJECTION, SOLUTION INTRAMUSCULAR; INTRAVENOUS AS NEEDED
Status: DISCONTINUED | OUTPATIENT
Start: 2018-11-10 | End: 2018-11-10 | Stop reason: HOSPADM

## 2018-11-10 RX ORDER — EPINEPHRINE 0.1 MG/ML
1 INJECTION INTRACARDIAC; INTRAVENOUS
Status: ACTIVE | OUTPATIENT
Start: 2018-11-10 | End: 2018-11-11

## 2018-11-10 RX ORDER — NALOXONE HYDROCHLORIDE 0.4 MG/ML
0.04 INJECTION, SOLUTION INTRAMUSCULAR; INTRAVENOUS; SUBCUTANEOUS
Status: DISCONTINUED | OUTPATIENT
Start: 2018-11-10 | End: 2018-11-10 | Stop reason: HOSPADM

## 2018-11-10 RX ORDER — ONDANSETRON 2 MG/ML
INJECTION INTRAMUSCULAR; INTRAVENOUS AS NEEDED
Status: DISCONTINUED | OUTPATIENT
Start: 2018-11-10 | End: 2018-11-10 | Stop reason: HOSPADM

## 2018-11-10 RX ORDER — LIDOCAINE HYDROCHLORIDE 20 MG/ML
INJECTION, SOLUTION EPIDURAL; INFILTRATION; INTRACAUDAL; PERINEURAL AS NEEDED
Status: DISCONTINUED | OUTPATIENT
Start: 2018-11-10 | End: 2018-11-10 | Stop reason: HOSPADM

## 2018-11-10 RX ORDER — FENTANYL CITRATE 50 UG/ML
INJECTION, SOLUTION INTRAMUSCULAR; INTRAVENOUS AS NEEDED
Status: DISCONTINUED | OUTPATIENT
Start: 2018-11-10 | End: 2018-11-10 | Stop reason: HOSPADM

## 2018-11-10 RX ORDER — FLUMAZENIL 0.1 MG/ML
0.2 INJECTION INTRAVENOUS
Status: DISCONTINUED | OUTPATIENT
Start: 2018-11-10 | End: 2018-11-10 | Stop reason: HOSPADM

## 2018-11-10 RX ORDER — SODIUM CHLORIDE 0.9 % (FLUSH) 0.9 %
5-10 SYRINGE (ML) INJECTION EVERY 8 HOURS
Status: DISCONTINUED | OUTPATIENT
Start: 2018-11-10 | End: 2018-11-10 | Stop reason: HOSPADM

## 2018-11-10 RX ORDER — ATROPINE SULFATE 0.1 MG/ML
0.5 INJECTION INTRAVENOUS
Status: ACTIVE | OUTPATIENT
Start: 2018-11-10 | End: 2018-11-11

## 2018-11-10 RX ORDER — ROCURONIUM BROMIDE 10 MG/ML
INJECTION, SOLUTION INTRAVENOUS AS NEEDED
Status: DISCONTINUED | OUTPATIENT
Start: 2018-11-10 | End: 2018-11-10 | Stop reason: HOSPADM

## 2018-11-10 RX ADMIN — POTASSIUM CHLORIDE 10 MEQ: 10 INJECTION, SOLUTION INTRAVENOUS at 08:15

## 2018-11-10 RX ADMIN — SODIUM CHLORIDE AND POTASSIUM CHLORIDE: 4.5; 1.49 INJECTION, SOLUTION INTRAVENOUS at 08:12

## 2018-11-10 RX ADMIN — MIDAZOLAM HYDROCHLORIDE 2 MG: 1 INJECTION, SOLUTION INTRAMUSCULAR; INTRAVENOUS at 11:07

## 2018-11-10 RX ADMIN — Medication 10 ML: at 06:52

## 2018-11-10 RX ADMIN — FENTANYL CITRATE 100 MCG: 50 INJECTION, SOLUTION INTRAMUSCULAR; INTRAVENOUS at 11:17

## 2018-11-10 RX ADMIN — ROCURONIUM BROMIDE 5 MG: 10 INJECTION, SOLUTION INTRAVENOUS at 11:17

## 2018-11-10 RX ADMIN — HYDROMORPHONE HYDROCHLORIDE 1 MG: 2 INJECTION, SOLUTION INTRAMUSCULAR; INTRAVENOUS; SUBCUTANEOUS at 07:03

## 2018-11-10 RX ADMIN — INSULIN GLARGINE 20 UNITS: 100 INJECTION, SOLUTION SUBCUTANEOUS at 08:13

## 2018-11-10 RX ADMIN — INSULIN LISPRO 3 UNITS: 100 INJECTION, SOLUTION INTRAVENOUS; SUBCUTANEOUS at 06:52

## 2018-11-10 RX ADMIN — GLYCOPYRROLATE 0.4 MG: 0.2 INJECTION INTRAMUSCULAR; INTRAVENOUS at 11:50

## 2018-11-10 RX ADMIN — POTASSIUM CHLORIDE 10 MEQ: 10 INJECTION, SOLUTION INTRAVENOUS at 08:14

## 2018-11-10 RX ADMIN — AMLODIPINE BESYLATE 5 MG: 5 TABLET ORAL at 08:13

## 2018-11-10 RX ADMIN — SODIUM CHLORIDE, SODIUM LACTATE, POTASSIUM CHLORIDE, AND CALCIUM CHLORIDE 150 ML/HR: 600; 310; 30; 20 INJECTION, SOLUTION INTRAVENOUS at 07:03

## 2018-11-10 RX ADMIN — METOPROLOL TARTRATE 25 MG: 25 TABLET ORAL at 21:51

## 2018-11-10 RX ADMIN — AMPICILLIN SODIUM AND SULBACTAM SODIUM 3 G: 2; 1 INJECTION, POWDER, FOR SOLUTION INTRAMUSCULAR; INTRAVENOUS at 21:45

## 2018-11-10 RX ADMIN — ROCURONIUM BROMIDE 15 MG: 10 INJECTION, SOLUTION INTRAVENOUS at 11:19

## 2018-11-10 RX ADMIN — NEOSTIGMINE METHYLSULFATE 3 MG: 1 INJECTION INTRAVENOUS at 11:50

## 2018-11-10 RX ADMIN — POTASSIUM CHLORIDE 10 MEQ: 10 INJECTION, SOLUTION INTRAVENOUS at 11:00

## 2018-11-10 RX ADMIN — LIDOCAINE HYDROCHLORIDE 100 MG: 20 INJECTION, SOLUTION EPIDURAL; INFILTRATION; INTRACAUDAL; PERINEURAL at 11:17

## 2018-11-10 RX ADMIN — POTASSIUM CHLORIDE 10 MEQ: 10 INJECTION, SOLUTION INTRAVENOUS at 12:32

## 2018-11-10 RX ADMIN — ONDANSETRON 4 MG: 2 INJECTION INTRAMUSCULAR; INTRAVENOUS at 11:21

## 2018-11-10 RX ADMIN — INSULIN LISPRO 3 UNITS: 100 INJECTION, SOLUTION INTRAVENOUS; SUBCUTANEOUS at 17:55

## 2018-11-10 RX ADMIN — INDOMETHACIN 100 MG: 50 SUPPOSITORY RECTAL at 11:00

## 2018-11-10 RX ADMIN — PROPOFOL 200 MG: 10 INJECTION, EMULSION INTRAVENOUS at 11:17

## 2018-11-10 RX ADMIN — SODIUM CHLORIDE, SODIUM LACTATE, POTASSIUM CHLORIDE, CALCIUM CHLORIDE: 600; 310; 30; 20 INJECTION, SOLUTION INTRAVENOUS at 10:30

## 2018-11-10 RX ADMIN — SODIUM CHLORIDE AND POTASSIUM CHLORIDE: 4.5; 1.49 INJECTION, SOLUTION INTRAVENOUS at 21:44

## 2018-11-10 RX ADMIN — SUCCINYLCHOLINE CHLORIDE 160 MG: 20 INJECTION INTRAMUSCULAR; INTRAVENOUS at 11:17

## 2018-11-10 RX ADMIN — AMPICILLIN SODIUM AND SULBACTAM SODIUM 3 G: 2; 1 INJECTION, POWDER, FOR SOLUTION INTRAMUSCULAR; INTRAVENOUS at 08:13

## 2018-11-10 RX ADMIN — AMPICILLIN SODIUM AND SULBACTAM SODIUM 3 G: 2; 1 INJECTION, POWDER, FOR SOLUTION INTRAMUSCULAR; INTRAVENOUS at 04:21

## 2018-11-10 RX ADMIN — IOPAMIDOL: 612 INJECTION, SOLUTION INTRAVENOUS at 11:00

## 2018-11-10 RX ADMIN — AMPICILLIN SODIUM AND SULBACTAM SODIUM 3 G: 2; 1 INJECTION, POWDER, FOR SOLUTION INTRAMUSCULAR; INTRAVENOUS at 16:17

## 2018-11-10 RX ADMIN — Medication 5 ML: at 14:00

## 2018-11-10 RX ADMIN — METOPROLOL TARTRATE 25 MG: 25 TABLET ORAL at 08:13

## 2018-11-10 NOTE — PROGRESS NOTES
5th floor notified via [employer] message ( Receiving RN  RN) of return to room post procedure and to anticipate arrival in   15-20   minutes to room 506.

## 2018-11-10 NOTE — PROCEDURES
Jose Del Angel M.D.  [phone]      November 10, 2018    Endoscopic Retrograde CholangiPancreatography (ERCP) Procedure Note    Ida Sue  : 1965  Slade Bishop Medical Record Number: @MRN      Indications:   Obstructive jaundice with dilated common bile duct  Referring Physician:  No ref. provider found  Anesthesia/Sedation:see anesthesia note  Endoscopist:  Darrell Bynum MD    Permit:  The indications, risks, benefits and alternatives were reviewed with the patient or their decision maker who was provided an opportunity to ask questions and all questions were answered. The specific risks of endoscopic retrograde cholangiopancreatography with conscious sedation were reviewed, including but not limited to anesthetic complication, bleeding, adverse drug reaction, missed lesion, infection, IV site reactions, intestinal perforation which would lead to the need for surgical repair, failed cannulation, and post-ERCP pancreatitis. Specific mention was made of the incidence of post-ERCP pancreatitis, estimated at 5% or 1 out of 20. The patient was advised that although most who develop post-ERCP pancreatitis have mild interstitial pancreatitis, some patients can develop severe necrotizing pancreatitis which could lead to the need for prolonged hospitalization, the need for surgery, the need for antibiotics, the need for blood products, or the potential for subsequent sepsis, multiorgan failure, or even death. The alternatives to ERCP including observation without testing, magnetic resonance cholangiopancreatography, surgery, or percutaneous cholangiopancreatography were reviewed as well as the benefits and limitations of each of the above alternatives. After considering the options and having all their questions answered, the patient or their decision maker provided both verbal and written consent to proceed.         Procedure in Detail:  After obtaining informed consent, positioning of the patient prone, and conduction of a pre-procedure pause or \"time out\" the endoscope was introduced into the mouth and advanced to the duodenum to visualize and instrument the ampullary opening. The bile duct was cannulated, the pancreatic duct was not cannulated. A cholangiogram was obtained. A pancreatogram was not obtained. An endoscopic sphincterotomy/papillotomy was performed. A stent was not deployed. The patient tolerated the procedure well and was sent to recovery with vital signs seen as normal compared with pre-procedure. Findings:   Ampulla:-normal     Biliary Tree: -biliary ductal dilation, with a maximum common duct diameter of 9 mm, -Non visualization of gallladder    Pancreatic duct: not examined    Therapeautics:  standard Papillotomy and Stone Extraction  balloon with  complete clearance    EBL: None     Specimens: none           Recommendations: HIDA scan to R/O cholecystitis  Follow LFTs, CBC  Continue Abxs    Thank you for entrusting me with this patient's care. Please do not hesitate to contact me with any questions or if I can be of assistance with any of your other patients' GI needs.     Signed By: Darrell Bynum MD                        November 10, 2018

## 2018-11-10 NOTE — ANESTHESIA PREPROCEDURE EVALUATION
Anesthetic History No history of anesthetic complications Review of Systems / Medical History Patient summary reviewed, nursing notes reviewed and pertinent labs reviewed Pulmonary Within defined limits Neuro/Psych Within defined limits Cardiovascular Hypertension GI/Hepatic/Renal 
  
 
 
Renal disease: ARF Liver disease Comments: CBD stone causing Pancreatitis Endo/Other Diabetes Morbid obesity Other Findings Physical Exam 
 
Airway Mallampati: III Neck ROM: normal range of motion Mouth opening: Normal 
 
 Cardiovascular Rhythm: regular Rate: normal 
 
 
 
 Dental 
No notable dental hx Pulmonary Breath sounds clear to auscultation Abdominal 
GI exam deferred Other Findings Anesthetic Plan ASA: 3 Anesthesia type: general 
 
 
 
 
Induction: Intravenous Anesthetic plan and risks discussed with: Patient

## 2018-11-10 NOTE — PROGRESS NOTES
[facility] Medical Progress Note NAME: Marily Dear :  1965 MRM:  [id number] Date/Time: 11/10/2018  8:45 AM 
 
  
Assessment and Plan:  
 
Gallstone pancreatitis / Abnormal LFTs / Cholelithiasis / Hyperbilirubinemia - POA, new. MRCP shows stone. ERCP delayed, awaiting attempt to find a endoscopy GI MD, perhaps today. Cholecystectomy ASAP after that. NPO for now. Pain and nausea control. IVF. LFTs a bit better, bili again worse. Sepsis / Sinus tachycardia / Leukocytosis - Developed after admission. No fever. Might be non infectious, but will start Unasyn for potential developing ascending cholangitis. Diabetes mellitus type 2, controlled - Diabetic diet and counseling when eating, NPO for now. SSI per protocol. Started Lantus due to persistently elevated BG while NPO, will increase to 30. Check A1c. Essential hypertension - Continue amlodipine and metoprolol ARF (acute renal failure) / Hypokalemia - POA due to poor PO intake. Hydrate and again replete K and follow lytes. Obesity, unspecified - Advise weight loss. Other hyperlipidemia - hold statin Subjective: Chief Complaint:  Abd pain a bit better, awaiting ERCP. 
 
ROS: 
(bold if positive, if negative) Abd Pain Tolerating PT   NPO Objective:  
 
Last 24hrs VS reviewed since prior progress note. Most recent are: 
 
Visit Vitals /80 (BP Patient Position: At rest) Pulse (!) 112 Temp 99.1 °F (37.3 °C) Resp 18 Ht 5' 8\" (1.727 m) Wt 110.2 kg (243 lb) SpO2 90% BMI 36.95 kg/m² SpO2 Readings from Last 6 Encounters:  
11/10/18 90% Intake/Output Summary (Last 24 hours) at 11/10/2018 0845 Last data filed at 11/10/2018 2610 Gross per 24 hour Intake 3940 ml Output  Net 3940 ml Physical Exam: 
 
Gen:  Obese, in no acute distress HEENT:  Pink conjunctivae, jaundice, PERRL, hearing intact to voice, moist mucous membranes Neck:  Supple, without masses, thyroid non-tender Resp:  No accessory muscle use, clear breath sounds without wheezes rales or rhonchi 
Card:  No murmurs, tachycardic S1, S2 without thrills, bruits or peripheral edema Abd:  Soft, minimally tender, non-distended, normoactive bowel sounds are present, no mass Lymph:  No cervical or inguinal adenopathy Musc:  No cyanosis or clubbing Skin:  No rashes or ulcers, skin turgor is good Neuro:  Cranial nerves are grossly intact, no focal motor weakness, follows commands appropriately Psych:  Good insight, oriented to person, place and time, alert Telemetry reviewed:   normal sinus rhythm 
__________________________________________________________________ Medications Reviewed: (see below) Medications:  
 
Current Facility-Administered Medications Medication Dose Route Frequency  0.45% sodium chloride with KCl 20 mEq/L infusion   IntraVENous CONTINUOUS  
 potassium chloride 10 mEq in 100 ml IVPB  10 mEq IntraVENous Q1H  
 insulin glargine (LANTUS) injection 20 Units  20 Units SubCUTAneous DAILY  ampicillin-sulbactam (UNASYN) 3 g in 0.9% sodium chloride (MBP/ADV) 100 mL  3 g IntraVENous Q6H  
 amLODIPine (NORVASC) tablet 5 mg  5 mg Oral DAILY  metoprolol tartrate (LOPRESSOR) tablet 25 mg  25 mg Oral Q12H  
 sodium chloride (NS) flush 5-10 mL  5-10 mL IntraVENous Q8H  
 sodium chloride (NS) flush 5-10 mL  5-10 mL IntraVENous PRN  
 ondansetron (ZOFRAN) injection 4 mg  4 mg IntraVENous Q4H PRN  
 insulin lispro (HUMALOG) injection   SubCUTAneous Q6H  
 glucose chewable tablet 16 g  4 Tab Oral PRN  
 dextrose (D50W) injection syrg 12.5-25 g  12.5-25 g IntraVENous PRN  
 glucagon (GLUCAGEN) injection 1 mg  1 mg IntraMUSCular PRN  
 influenza vaccine 2018-19 (6 mos+)(PF) (FLUARIX QUAD/FLULAVAL QUAD) injection 0.5 mL  0.5 mL IntraMUSCular PRIOR TO DISCHARGE  
 HYDROmorphone (PF) (DILAUDID) injection 1 mg  1 mg IntraVENous Q4H PRN  
  
 
 Lab Data Reviewed: (see below) Lab Review:  
 
Recent Labs 11/10/18 
0209 11/09/18 
0157 11/08/18 
0216 WBC 22.3* 23.0* 11.2* HGB 14.1 15.0 16.4 HCT 40.9 42.7 47.5  275 358 Recent Labs 11/10/18 
0209 11/09/18 
0157 11/08/18 
0216  136 136  
K 3.2* 3.2* 2.8*  
 98 93* CO2 33* 29 28 * 271* 303* BUN 17 13 9 CREA 1.33* 1.38* 1.67* CA 8.3* 8.3* 9.7 MG 1.8 1.3* 1.5* PHOS 1.3*  --   --   
ALB 2.7* 3.0* 4.1 TBILI 10.9* 9.6* 7.6* SGOT 141* 280* 308* * 539* 711* Lab Results Component Value Date/Time Glucose (POC) 224 (H) 11/10/2018 06:38 AM  
 Glucose (POC) 219 (H) 11/09/2018 11:40 PM  
 Glucose (POC) 266 (H) 11/09/2018 05:38 PM  
 Glucose (POC) 304 (H) 11/09/2018 11:25 AM  
 Glucose (POC) 250 (H) 11/09/2018 06:18 AM  
 
No results for input(s): PH, PCO2, PO2, HCO3, FIO2 in the last 72 hours. No results for input(s): INR in the last 72 hours. No lab exists for component: INREXT, INREXT All Micro Results None I have reviewed notes of prior 24hr. Other pertinent lab: none Total time spent with patient: 45 Minutes Care Plan discussed with: Patient, Care Manager, Nursing Staff, Consultant/Specialist and >50% of time spent in counseling and coordination of care Discussed:  Care Plan Prophylaxis:  SCD's and H2B/PPI Disposition:  Home w/Family 
        
___________________________________________________ Attending Physician: Sonido Saenz MD

## 2018-11-10 NOTE — PROGRESS NOTES
Dr Ashley Soria at bedside. Pt recognizes MDA and previous RN met before surgery. Reorients and MDA feels ready to return to room.

## 2018-11-10 NOTE — PROGRESS NOTES
Bedside and Verbal shift change report given to Pino Jackson (oncoming nurse) by Jeri Pham (offgoing nurse). Report included the following information SBAR, Kardex, Intake/Output, MAR, Accordion and Recent Results.

## 2018-11-10 NOTE — PROGRESS NOTES
POST ANESTHESIA CARE DISCHARGE / TRANSFER NOTE Ida Sue was   transfered   via   Bed      to    hospital room 506  . Patient was escorted by     nurse     . Patient verbalized   appreciation and was very pleased with care received     throughout their stay. Patient was discharged in     pleasant mood    . Pain at discharge/transfer was     0 /10. Discharge, medication and follow-up instructions were verbalized as understood prior to discharge  (if applicable for same-day procedures being discharged.) All personal belongings have been returned to patient, and patient/family verbally confirm receiving belongings as all present. TRANSFER - OUT REPORT: 
 
VERBAL   PHONE  report given at 1:35 PM to   [facility] [place] [facility]  RN  receiving   Ida Sue   and being transferred to    5thM/S       for routine post - op  Following General for Procedure(s) (LRB): ENDOSCOPIC RETROGRADE CHOLANGIOPANCREATOGRAPHY (ERCP) (N/A) by  Curly Ayala MD. 
 
Patient Situation, Background, Assessment and Recommendations (SBAR) was provided and included information from the following SBAR report(s) (SBAR, OR Summary and MAR) which were reviewed with the receiving nurse. Lines:  
Peripheral IV 11/09/18 Right Forearm (Active) Site Assessment Clean, dry, & intact 11/10/2018 12:10 PM  
Phlebitis Assessment 0 11/10/2018 12:10 PM  
Infiltration Assessment 0 11/10/2018 12:10 PM  
Dressing Status Clean, dry, & intact 11/10/2018 12:10 PM  
Dressing Type Transparent;Tape 11/10/2018 12:10 PM  
Hub Color/Line Status Patent; Infusing 11/10/2018 12:10 PM  
Action Taken Open ports on tubing capped 11/10/2018  3:10 AM  
Alcohol Cap Used Yes 11/10/2018  3:10 AM  
  
Also Reviewed (checked if applicable):   [] NO ADDITIONAL REVIEWS [] PCA Drug and Settings     [] Cold Therapy with extra gels     [] On-Q @  ml/hr  
[] Drains x       [] Significant Wound care/devices (e.g. Wound vac, etc.) [] Holding pt in PACU awaiting bed availability - additional care provided and eMAR review 
[] Vent Settings      [] Critical Care Drips Contact Precautions: There are currently no Active Isolations Patient transported with:  O2 @ 2 liters Registered Nurse Additional Information: Family at bedside after report. VSS, Pt NAD and A&Ox4 at this time. After report, opportunity for questions and clarification was provided.    
Signed: Jackelin OWENSN RN-BC

## 2018-11-10 NOTE — PROGRESS NOTES
ERCP showed mildly dilated biliary tree No CBD stone found Much of cystic duct fills GB did not fill Will order HIDA scan Labs in AM CBC, CMP, Lipase

## 2018-11-10 NOTE — ANESTHESIA POSTPROCEDURE EVALUATION
Procedure(s): ENDOSCOPIC RETROGRADE CHOLANGIOPANCREATOGRAPHY (ERCP). Anesthesia Post Evaluation Patient location during evaluation: bedside Patient participation: complete - patient participated Level of consciousness: awake Pain management: adequate Airway patency: patent Anesthetic complications: no 
Cardiovascular status: acceptable Respiratory status: acceptable Hydration status: acceptable Post anesthesia nausea and vomiting:  none Visit Vitals /78 Pulse (!) 103 Temp 37.7 °C (99.9 °F) Resp 19 Ht 5' 8\" (1.727 m) Wt 110.2 kg (243 lb) SpO2 92% BMI 36.95 kg/m²

## 2018-11-10 NOTE — PROGRESS NOTES
General Surgery Daily Progress Note Patient: Swetha Anton MRN: [id number]  SSN: [id number] YOB: 1965  Age: 48 y.o. Sex: male Admit Date: 11/8/2018 Subjective:  
Patient denies any pain currently. He is hungry. Current Facility-Administered Medications Medication Dose Route Frequency  0.45% sodium chloride with KCl 20 mEq/L infusion   IntraVENous CONTINUOUS  
 potassium chloride 10 mEq in 100 ml IVPB  10 mEq IntraVENous Q1H  
 insulin glargine (LANTUS) injection 20 Units  20 Units SubCUTAneous DAILY  ampicillin-sulbactam (UNASYN) 3 g in 0.9% sodium chloride (MBP/ADV) 100 mL  3 g IntraVENous Q6H  
 amLODIPine (NORVASC) tablet 5 mg  5 mg Oral DAILY  metoprolol tartrate (LOPRESSOR) tablet 25 mg  25 mg Oral Q12H  
 sodium chloride (NS) flush 5-10 mL  5-10 mL IntraVENous Q8H  
 sodium chloride (NS) flush 5-10 mL  5-10 mL IntraVENous PRN  
 ondansetron (ZOFRAN) injection 4 mg  4 mg IntraVENous Q4H PRN  
 insulin lispro (HUMALOG) injection   SubCUTAneous Q6H  
 glucose chewable tablet 16 g  4 Tab Oral PRN  
 dextrose (D50W) injection syrg 12.5-25 g  12.5-25 g IntraVENous PRN  
 glucagon (GLUCAGEN) injection 1 mg  1 mg IntraMUSCular PRN  
 influenza vaccine 2018-19 (6 mos+)(PF) (FLUARIX QUAD/FLULAVAL QUAD) injection 0.5 mL  0.5 mL IntraMUSCular PRIOR TO DISCHARGE  
 HYDROmorphone (PF) (DILAUDID) injection 1 mg  1 mg IntraVENous Q4H PRN Objective:  
11/10 0701 - 11/10 1900 In: 9014 [I.V.:1790] Out: -  
11/08 1901 - 11/10 0700 In: 9115 [I.V.:6420] Out: -  
Patient Vitals for the past 8 hrs: 
 BP Temp Pulse Resp SpO2  
11/10/18 0820 145/80 99.1 °F (37.3 °C) (!) 112 18   
11/10/18 0422 143/76 99.1 °F (37.3 °C) (!) 115 18 90 % Physical Exam: 
General: Alert, cooperative, no distress, appears stated age. Inetta Billingsley Neck:  Supple, symmetrical, trachea midline, no adenopathy, thyroid: no                           enlargement/tenderness/nodules, no carotid bruit and no JVD. Lungs: Clear to auscultation bilaterally. Heart:  Regular rate and rhythm, S1, S2 normal, no murmur, click, rub or gallop. Abdomen: Soft, non-tender. Bowel sounds normal. No masses,  No organomegaly. Extremities: Extremities normal, atraumatic, no cyanosis or edema. Skin:  Skin color, texture, turgor normal. No rashes or lesions Labs:  
Recent Labs 11/10/18 
0209 WBC 22.3* HGB 14.1 HCT 40.9  Recent Labs 11/10/18 
0209   
K 3.2*  
 CO2 33* * BUN 17 CREA 1.33* CA 8.3*  
MG 1.8 PHOS 1.3* ALB 2.7* TBILI 10.9* SGOT 141* * X-ray Assessment / Plan: · Awaiting ERCP 
· If this is successful then I can proceed with lap sindy tomorrow · Keep him NPO tonight Principal Problem: 
  Gallstone pancreatitis (11/8/2018) Active Problems: 
  Diabetes mellitus type 2, controlled ([address] (2/16/2013) Obesity, unspecified (2/16/2013) Other hyperlipidemia (2/16/2013) Essential hypertension (2/16/2013) Pancreatitis (11/8/2018) Hypokalemia (11/8/2018) ARF (acute renal failure) ([address] (11/8/2018) Abnormal LFTs (11/8/2018) Cholelithiasis (11/8/2018) Hyperbilirubinemia (11/9/2018)

## 2018-11-11 ENCOUNTER — APPOINTMENT (OUTPATIENT)
Dept: NUCLEAR MEDICINE | Age: 53
DRG: 417 | End: 2018-11-11
Attending: SPECIALIST
Payer: SELF-PAY

## 2018-11-11 LAB
ALBUMIN SERPL-MCNC: 2.4 G/DL (ref 3.5–5)
ALBUMIN/GLOB SERPL: 0.8 {RATIO} (ref 1.1–2.2)
ALP SERPL-CCNC: 276 U/L (ref 45–117)
ALT SERPL-CCNC: 238 U/L (ref 12–78)
ANION GAP SERPL CALC-SCNC: 4 MMOL/L (ref 5–15)
AST SERPL-CCNC: 56 U/L (ref 15–37)
BILIRUB SERPL-MCNC: 3 MG/DL (ref 0.2–1)
BUN SERPL-MCNC: 19 MG/DL (ref 6–20)
BUN/CREAT SERPL: 16 (ref 12–20)
CALCIUM SERPL-MCNC: 7.3 MG/DL (ref 8.5–10.1)
CHLORIDE SERPL-SCNC: 103 MMOL/L (ref 97–108)
CO2 SERPL-SCNC: 33 MMOL/L (ref 21–32)
CREAT SERPL-MCNC: 1.2 MG/DL (ref 0.7–1.3)
ERYTHROCYTE [DISTWIDTH] IN BLOOD BY AUTOMATED COUNT: 16.1 % (ref 11.5–14.5)
GLOBULIN SER CALC-MCNC: 3.2 G/DL (ref 2–4)
GLUCOSE BLD STRIP.AUTO-MCNC: 133 MG/DL (ref 65–100)
GLUCOSE BLD STRIP.AUTO-MCNC: 145 MG/DL (ref 65–100)
GLUCOSE BLD STRIP.AUTO-MCNC: 146 MG/DL (ref 65–100)
GLUCOSE BLD STRIP.AUTO-MCNC: 148 MG/DL (ref 65–100)
GLUCOSE BLD STRIP.AUTO-MCNC: 159 MG/DL (ref 65–100)
GLUCOSE SERPL-MCNC: 157 MG/DL (ref 65–100)
HCT VFR BLD AUTO: 35.5 % (ref 36.6–50.3)
HGB BLD-MCNC: 12 G/DL (ref 12.1–17)
LIPASE SERPL-CCNC: 124 U/L (ref 73–393)
MAGNESIUM SERPL-MCNC: 2.1 MG/DL (ref 1.6–2.4)
MCH RBC QN AUTO: 29.2 PG (ref 26–34)
MCHC RBC AUTO-ENTMCNC: 33.8 G/DL (ref 30–36.5)
MCV RBC AUTO: 86.4 FL (ref 80–99)
NRBC # BLD: 0 K/UL (ref 0–0.01)
NRBC BLD-RTO: 0 PER 100 WBC
PHOSPHATE SERPL-MCNC: 1.4 MG/DL (ref 2.6–4.7)
PLATELET # BLD AUTO: 230 K/UL (ref 150–400)
PMV BLD AUTO: 10.9 FL (ref 8.9–12.9)
POTASSIUM SERPL-SCNC: 3.6 MMOL/L (ref 3.5–5.1)
PROT SERPL-MCNC: 5.6 G/DL (ref 6.4–8.2)
RBC # BLD AUTO: 4.11 M/UL (ref 4.1–5.7)
SERVICE CMNT-IMP: ABNORMAL
SODIUM SERPL-SCNC: 140 MMOL/L (ref 136–145)
WBC # BLD AUTO: 17.2 K/UL (ref 4.1–11.1)

## 2018-11-11 PROCEDURE — 65270000029 HC RM PRIVATE

## 2018-11-11 PROCEDURE — 74011636637 HC RX REV CODE- 636/637: Performed by: INTERNAL MEDICINE

## 2018-11-11 PROCEDURE — 74011000258 HC RX REV CODE- 258: Performed by: INTERNAL MEDICINE

## 2018-11-11 PROCEDURE — 83735 ASSAY OF MAGNESIUM: CPT

## 2018-11-11 PROCEDURE — 74011250636 HC RX REV CODE- 250/636: Performed by: INTERNAL MEDICINE

## 2018-11-11 PROCEDURE — 74011250637 HC RX REV CODE- 250/637: Performed by: INTERNAL MEDICINE

## 2018-11-11 PROCEDURE — 82962 GLUCOSE BLOOD TEST: CPT

## 2018-11-11 PROCEDURE — 36415 COLL VENOUS BLD VENIPUNCTURE: CPT

## 2018-11-11 PROCEDURE — 83690 ASSAY OF LIPASE: CPT

## 2018-11-11 PROCEDURE — 85027 COMPLETE CBC AUTOMATED: CPT

## 2018-11-11 PROCEDURE — 80053 COMPREHEN METABOLIC PANEL: CPT

## 2018-11-11 PROCEDURE — 74011250636 HC RX REV CODE- 250/636: Performed by: RADIOLOGY

## 2018-11-11 PROCEDURE — 94760 N-INVAS EAR/PLS OXIMETRY 1: CPT

## 2018-11-11 PROCEDURE — A9537 TC99M MEBROFENIN: HCPCS

## 2018-11-11 PROCEDURE — 84100 ASSAY OF PHOSPHORUS: CPT

## 2018-11-11 RX ORDER — MORPHINE SULFATE 2 MG/ML
2 INJECTION, SOLUTION INTRAMUSCULAR; INTRAVENOUS
Status: DISCONTINUED | OUTPATIENT
Start: 2018-11-11 | End: 2018-11-11

## 2018-11-11 RX ORDER — MORPHINE SULFATE 4 MG/ML
2 INJECTION INTRAVENOUS ONCE
Status: COMPLETED | OUTPATIENT
Start: 2018-11-11 | End: 2018-11-11

## 2018-11-11 RX ADMIN — Medication 10 ML: at 21:07

## 2018-11-11 RX ADMIN — METOPROLOL TARTRATE 25 MG: 25 TABLET ORAL at 21:06

## 2018-11-11 RX ADMIN — AMPICILLIN SODIUM AND SULBACTAM SODIUM 3 G: 2; 1 INJECTION, POWDER, FOR SOLUTION INTRAMUSCULAR; INTRAVENOUS at 09:05

## 2018-11-11 RX ADMIN — AMPICILLIN SODIUM AND SULBACTAM SODIUM 3 G: 2; 1 INJECTION, POWDER, FOR SOLUTION INTRAMUSCULAR; INTRAVENOUS at 03:21

## 2018-11-11 RX ADMIN — Medication 5 ML: at 14:00

## 2018-11-11 RX ADMIN — AMLODIPINE BESYLATE 5 MG: 5 TABLET ORAL at 09:05

## 2018-11-11 RX ADMIN — INSULIN LISPRO 2 UNITS: 100 INJECTION, SOLUTION INTRAVENOUS; SUBCUTANEOUS at 06:33

## 2018-11-11 RX ADMIN — HYDROMORPHONE HYDROCHLORIDE 1 MG: 2 INJECTION, SOLUTION INTRAMUSCULAR; INTRAVENOUS; SUBCUTANEOUS at 09:19

## 2018-11-11 RX ADMIN — Medication 10 ML: at 06:34

## 2018-11-11 RX ADMIN — INSULIN LISPRO 2 UNITS: 100 INJECTION, SOLUTION INTRAVENOUS; SUBCUTANEOUS at 00:20

## 2018-11-11 RX ADMIN — HYDROMORPHONE HYDROCHLORIDE 1 MG: 2 INJECTION, SOLUTION INTRAMUSCULAR; INTRAVENOUS; SUBCUTANEOUS at 22:34

## 2018-11-11 RX ADMIN — BENZOCAINE, MENTHOL 1 LOZENGE: 15; 3.6 LOZENGE ORAL at 06:57

## 2018-11-11 RX ADMIN — METOPROLOL TARTRATE 25 MG: 25 TABLET ORAL at 09:05

## 2018-11-11 RX ADMIN — HYDROMORPHONE HYDROCHLORIDE 1 MG: 2 INJECTION, SOLUTION INTRAMUSCULAR; INTRAVENOUS; SUBCUTANEOUS at 00:21

## 2018-11-11 RX ADMIN — AMPICILLIN SODIUM AND SULBACTAM SODIUM 3 G: 2; 1 INJECTION, POWDER, FOR SOLUTION INTRAMUSCULAR; INTRAVENOUS at 21:06

## 2018-11-11 RX ADMIN — MORPHINE SULFATE 2 MG: 4 INJECTION, SOLUTION INTRAMUSCULAR; INTRAVENOUS at 17:00

## 2018-11-11 RX ADMIN — INSULIN GLARGINE 30 UNITS: 100 INJECTION, SOLUTION SUBCUTANEOUS at 09:05

## 2018-11-11 RX ADMIN — SODIUM CHLORIDE AND POTASSIUM CHLORIDE: 4.5; 1.49 INJECTION, SOLUTION INTRAVENOUS at 06:34

## 2018-11-11 NOTE — DISCHARGE SUMMARY
Physician Interim Discharge Summary     Patient ID:  Wendy Triplett  [id number]  67 y.o.  1965    Admit date: 11/8/2018    Admission Diagnoses: Pancreatitis  Gallstone pancreatitis    Current Diagnoses:    Principal Diagnosis   Gallstone pancreatitis                                             Other Diagnoses    Diabetes mellitus type 2, controlled ([facility] (2/16/2013)    Obesity, unspecified (2/16/2013)    Other hyperlipidemia (2/16/2013)    Essential hypertension (2/16/2013)    Pancreatitis (11/8/2018)    Hypokalemia (11/8/2018)    ARF (acute renal failure) ([facility] (11/8/2018)    Abnormal LFTs (11/8/2018)    Cholelithiasis (11/8/2018)    Hyperbilirubinemia (11/9/2018)     Hospital Course through 11/11/2018:   Gallstone pancreatitis / Abnormal LFTs / Cholelithiasis / Hyperbilirubinemia - POA, new. MRCP shows stone. ERCP delayed until 11/10. No stone found, but LFT/bili much better after procedure. Cholecystectomy ASAP after that. NPO for now. Pain and nausea control. IVF.     Sepsis / Sinus tachycardia / Leukocytosis - Developed after admission. No fever, and WBC coming down after ERCP. Might be non infectious, but continue Unasyn for potential developing ascending cholangitis.     Diabetes mellitus type 2, controlled - Diabetic diet and counseling when eating, NPO for now. SSI per protocol. Started Lantus due to persistently elevated BG while NPO. Check A1c.     Anemia - Developed only after hydration. Monitor. Check serologies. Currently SCDs, not lovenox.     Essential hypertension - Continue amlodipine and metoprolol     ARF (acute renal failure) / Hypokalemia - POA due to poor PO intake, now resolved after hydration.   Replete K as needed and follow lytes.     Obesity, unspecified - Advise weight loss.     Other hyperlipidemia - hold statin    PCP: Jenna, MD Manuel    Consults: GI and General Surgery    Significant Diagnostic Studies: See Hospital Course    Further details by discharging physician    Signed:  Ignacio Hanson MD  11/11/2018  10:57 AM

## 2018-11-11 NOTE — PROGRESS NOTES
David Perez MD  
 
Gastroenterology Progress Note November 11, 2018 Admit Date: 11/8/2018 Narrative Assessment and Plan · Acute pancreatitis · Obstructive jaundice- improving. TB 3 (down from 10) · Non-filling of cystic duct on ERCP 
 
PLAN: 
HIDA scan today Tentative cholecystectomy tomorrow, pending HIDA results Dr Gisselle Bishop will resume GI follow up tomorrow Subjective: · Mild epig and RUQ pain ROS:  The previous review of systems on initial consultation / H&P is noted and reviewed. Specific changes noted above in HPI. Current Medications:  
 
Current Facility-Administered Medications Medication Dose Route Frequency  benzocaine-menthol (CEPACOL) lozenge 1 Lozenge  1 Lozenge Mucous Membrane PRN  
 0.45% sodium chloride with KCl 20 mEq/L infusion   IntraVENous CONTINUOUS  
 midazolam (VERSED) injection 0.25-5 mg  0.25-5 mg IntraVENous Multiple  simethicone (MYLICON) 14XJ/8.8OY oral drops 80 mg  1.2 mL Oral Multiple  insulin glargine (LANTUS) injection 30 Units  30 Units SubCUTAneous DAILY  ampicillin-sulbactam (UNASYN) 3 g in 0.9% sodium chloride (MBP/ADV) 100 mL  3 g IntraVENous Q6H  
 amLODIPine (NORVASC) tablet 5 mg  5 mg Oral DAILY  metoprolol tartrate (LOPRESSOR) tablet 25 mg  25 mg Oral Q12H  
 sodium chloride (NS) flush 5-10 mL  5-10 mL IntraVENous Q8H  
 sodium chloride (NS) flush 5-10 mL  5-10 mL IntraVENous PRN  
 ondansetron (ZOFRAN) injection 4 mg  4 mg IntraVENous Q4H PRN  
 insulin lispro (HUMALOG) injection   SubCUTAneous Q6H  
 glucose chewable tablet 16 g  4 Tab Oral PRN  
 dextrose (D50W) injection syrg 12.5-25 g  12.5-25 g IntraVENous PRN  
 glucagon (GLUCAGEN) injection 1 mg  1 mg IntraMUSCular PRN  
 influenza vaccine 2018-19 (6 mos+)(PF) (FLUARIX QUAD/FLULAVAL QUAD) injection 0.5 mL  0.5 mL IntraMUSCular PRIOR TO DISCHARGE  
 HYDROmorphone (PF) (DILAUDID) injection 1 mg  1 mg IntraVENous Q4H PRN Objective: VITALS:  
Last 24hrs VS reviewed since prior progress note. Most recent are: 
Visit Vitals /76 (BP 1 Location: Left arm, BP Patient Position: At rest;Sitting) Pulse 94 Temp 98.8 °F (37.1 °C) Resp 16 Ht 5' 8\" (1.727 m) Wt 110.2 kg (243 lb) SpO2 91% BMI 36.95 kg/m² Temp (24hrs), Av.3 °F (36.8 °C), Min:97.6 °F (36.4 °C), Max:99 °F (37.2 °C) Intake/Output Summary (Last 24 hours) at 2018 1333 Last data filed at 2018 4442 Gross per 24 hour Intake 3270 ml Output 550 ml Net 2720 ml EXAM: 
Gen: AOx3 NAD HEENT: mild icterus Abdo: soft, mild TTP in epig and RUQ. No rebound Lab Data Reviewed:  
Recent Labs 11/11/18 
0328 11/10/18 
0209 18 
0157 WBC 17.2* 22.3* 23.0* HGB 12.0* 14.1 15.0  
HCT 35.5* 40.9 42.7  244 275 Recent Labs 11/11/18 
0328 11/10/18 
0209 18 
0157  140 136  
K 3.6 3.2* 3.2*  
 100 98 CO2 33* 33* 29 * 219* 271* BUN 19 17 13 CREA 1.20 1.33* 1.38* CA 7.3* 8.3* 8.3*  
MG 2.1 1.8 1.3*  
PHOS 1.4* 1.3*  --   
ALB 2.4* 2.7* 3.0* TBILI 3.0* 10.9* 9.6* SGOT 56* 141* 280* * 360* 539* Lab Results Component Value Date/Time Glucose (POC) 145 (H) 2018 11:12 AM  
 Glucose (POC) 146 (H) 2018 07:47 AM  
 Glucose (POC) 148 (H) 2018 06:26 AM  
 Glucose (POC) 159 (H) 2018 12:14 AM  
 Glucose (POC) 202 (H) 11/10/2018 05:17 PM  
 
No results for input(s): PH, PCO2, PO2, HCO3, FIO2 in the last 72 hours. No results for input(s): INR in the last 72 hours. No lab exists for component: INREXT Assessment: (See above) Principal Problem: 
  Gallstone pancreatitis (2018) Active Problems: 
  Diabetes mellitus type 2, controlled ([facility] (2013) Obesity, unspecified (2013) Other hyperlipidemia (2013) Essential hypertension (2013) Pancreatitis (2018) Hypokalemia (2018) ARF (acute renal failure) ([facility] (11/8/2018) Abnormal LFTs (11/8/2018) Cholelithiasis (11/8/2018) Hyperbilirubinemia (11/9/2018) Plan: (See above) Signed By: Mahin Duval MD   
 11/11/2018  1:33 PM

## 2018-11-11 NOTE — PROGRESS NOTES
Problem: Falls - Risk of 
Goal: *Absence of Falls Document Jules Doran Fall Risk and appropriate interventions in the flowsheet. Outcome: Progressing Towards Goal 
Fall Risk Interventions: 
Mobility Interventions: Bed/chair exit alarm, Communicate number of staff needed for ambulation/transfer, Patient to call before getting OOB Medication Interventions: Bed/chair exit alarm, Patient to call before getting OOB, Teach patient to arise slowly Elimination Interventions: Bed/chair exit alarm, Call light in reach, Urinal in reach

## 2018-11-11 NOTE — PROGRESS NOTES
Bedside and Verbal shift change report given to MARIJA Jackson (oncoming nurse) by Smith Jensen (offgoing nurse). Report included the following information SBAR and Kardex.

## 2018-11-11 NOTE — PROGRESS NOTES
C/o sore/pain throat from procedure yesterday informed on call hospitalist.Cepacol Lozenges po as needed.

## 2018-11-11 NOTE — PROGRESS NOTES
[facility] Progress Note NAME: Livia Norris :  1965 MRM:  [id number] Date/Time: 2018  8:31 AM 
 
  
Assessment and Plan:  
 
Gallstone pancreatitis / Abnormal LFTs / Cholelithiasis / Hyperbilirubinemia - POA, new. MRCP shows stone. ERCP delayed until 11/10. No stone found, but LFT/bili much better after procedure. Cholecystectomy ASAP after that. NPO for now. Pain and nausea control. IVF. Sepsis / Sinus tachycardia / Leukocytosis - Developed after admission. No fever, and WBC coming down after ERCP. Might be non infectious, but continue Unasyn for potential developing ascending cholangitis. Diabetes mellitus type 2, controlled - Diabetic diet and counseling when eating, NPO for now. SSI per protocol. Started Lantus due to persistently elevated BG while NPO. Check A1c. Anemia - Developed only after hydration. Monitor. Check serologies. Currently SCDs, not lovenox. Essential hypertension - Continue amlodipine and metoprolol ARF (acute renal failure) / Hypokalemia - POA due to poor PO intake, now resolved after hydration. Replete K as needed and follow lytes. Obesity, unspecified - Advise weight loss. Other hyperlipidemia - hold statin Subjective: Chief Complaint:  Abd pain better, tolerated ERCP. 
 
ROS: 
(bold if positive, if negative) Abd Pain Tolerating PT   NPO Objective:  
 
Last 24hrs VS reviewed since prior progress note. Most recent are: 
 
Visit Vitals /69 (BP 1 Location: Left arm, BP Patient Position: At rest) Pulse 88 Temp 98.5 °F (36.9 °C) Resp 20 Ht 5' 8\" (1.727 m) Wt 110.2 kg (243 lb) SpO2 96% BMI 36.95 kg/m² SpO2 Readings from Last 6 Encounters:  
18 96% O2 Flow Rate (L/min): 4 l/min Intake/Output Summary (Last 24 hours) at 2018 0831 Last data filed at 2018 7501 Gross per 24 hour Intake 3270 ml Output 900 ml Net 2370 ml  
  
 
 Physical Exam: 
 
Gen:  Obese, in no acute distress HEENT:  Pink conjunctivae, jaundice, PERRL, hearing intact to voice, moist mucous membranes Neck:  Supple, without masses, thyroid non-tender Resp:  No accessory muscle use, clear breath sounds without wheezes rales or rhonchi 
Card:  No murmurs, tachycardic S1, S2 without thrills, bruits or peripheral edema Abd:  Soft, minimally tender, non-distended, normoactive bowel sounds are present, no mass Lymph:  No cervical or inguinal adenopathy Musc:  No cyanosis or clubbing Skin:  No rashes or ulcers, skin turgor is good Neuro:  Cranial nerves are grossly intact, no focal motor weakness, follows commands appropriately Psych:  Good insight, oriented to person, place and time, alert Telemetry reviewed:   normal sinus rhythm 
__________________________________________________________________ Medications Reviewed: (see below) Medications:  
 
Current Facility-Administered Medications Medication Dose Route Frequency  benzocaine-menthol (CEPACOL) lozenge 1 Lozenge  1 Lozenge Mucous Membrane PRN  
 0.45% sodium chloride with KCl 20 mEq/L infusion   IntraVENous CONTINUOUS  
 midazolam (VERSED) injection 0.25-5 mg  0.25-5 mg IntraVENous Multiple  simethicone (MYLICON) 21DW/0.7VE oral drops 80 mg  1.2 mL Oral Multiple  atropine injection 0.5 mg  0.5 mg IntraVENous ONCE PRN  
 EPINEPHrine (ADRENALIN) 0.1 mg/mL syringe 1 mg  1 mg Endoscopically ONCE PRN  
 insulin glargine (LANTUS) injection 30 Units  30 Units SubCUTAneous DAILY  ampicillin-sulbactam (UNASYN) 3 g in 0.9% sodium chloride (MBP/ADV) 100 mL  3 g IntraVENous Q6H  
 amLODIPine (NORVASC) tablet 5 mg  5 mg Oral DAILY  metoprolol tartrate (LOPRESSOR) tablet 25 mg  25 mg Oral Q12H  
 sodium chloride (NS) flush 5-10 mL  5-10 mL IntraVENous Q8H  
 sodium chloride (NS) flush 5-10 mL  5-10 mL IntraVENous PRN  
 ondansetron (ZOFRAN) injection 4 mg  4 mg IntraVENous Q4H PRN  
  insulin lispro (HUMALOG) injection   SubCUTAneous Q6H  
 glucose chewable tablet 16 g  4 Tab Oral PRN  
 dextrose (D50W) injection syrg 12.5-25 g  12.5-25 g IntraVENous PRN  
 glucagon (GLUCAGEN) injection 1 mg  1 mg IntraMUSCular PRN  
 influenza vaccine 2018-19 (6 mos+)(PF) (FLUARIX QUAD/FLULAVAL QUAD) injection 0.5 mL  0.5 mL IntraMUSCular PRIOR TO DISCHARGE  
 HYDROmorphone (PF) (DILAUDID) injection 1 mg  1 mg IntraVENous Q4H PRN Lab Data Reviewed: (see below) Lab Review:  
 
Recent Labs 11/11/18 0328 11/10/18 
0209 11/09/18 
0157 WBC 17.2* 22.3* 23.0* HGB 12.0* 14.1 15.0  
HCT 35.5* 40.9 42.7  244 275 Recent Labs 11/11/18 
0328 11/10/18 
0209 11/09/18 
0157  140 136  
K 3.6 3.2* 3.2*  
 100 98 CO2 33* 33* 29 * 219* 271* BUN 19 17 13 CREA 1.20 1.33* 1.38* CA 7.3* 8.3* 8.3*  
MG 2.1 1.8 1.3*  
PHOS 1.4* 1.3*  --   
ALB 2.4* 2.7* 3.0* TBILI 3.0* 10.9* 9.6* SGOT 56* 141* 280* * 360* 539* Lab Results Component Value Date/Time Glucose (POC) 146 (H) 11/11/2018 07:47 AM  
 Glucose (POC) 148 (H) 11/11/2018 06:26 AM  
 Glucose (POC) 159 (H) 11/11/2018 12:14 AM  
 Glucose (POC) 202 (H) 11/10/2018 05:17 PM  
 Glucose (POC) 224 (H) 11/10/2018 06:38 AM  
 
No results for input(s): PH, PCO2, PO2, HCO3, FIO2 in the last 72 hours. No results for input(s): INR in the last 72 hours. No lab exists for component: INREXT, INREXT All Micro Results None I have reviewed notes of prior 24hr. Other pertinent lab: none Total time spent with patient: 45 Minutes Care Plan discussed with: Patient, Care Manager, Nursing Staff, Consultant/Specialist and >50% of time spent in counseling and coordination of care Discussed:  Care Plan Prophylaxis:  SCD's and H2B/PPI Disposition:  Home w/Family 
        
___________________________________________________ Attending Physician: Malena Reyes MD

## 2018-11-11 NOTE — PROGRESS NOTES
Bedside and Verbal shift change report given to MARIJA Contreras (oncoming nurse) by Faisal Bynum RN (offgoing nurse). Report included the following information SBAR and Kardex.

## 2018-11-11 NOTE — PROGRESS NOTES
General Surgery Daily Progress Note Patient: Ida Sue MRN: [id number]  SSN: [id number] YOB: 1965  Age: 48 y.o. Sex: male Admit Date: 11/8/2018 Subjective:  
Patient had ERCP yesterday. No stones in CBD but GB did not fill Dr Sheela Joyner has requested a HIDA scan. Current Facility-Administered Medications Medication Dose Route Frequency  benzocaine-menthol (CEPACOL) lozenge 1 Lozenge  1 Lozenge Mucous Membrane PRN  
 0.45% sodium chloride with KCl 20 mEq/L infusion   IntraVENous CONTINUOUS  
 midazolam (VERSED) injection 0.25-5 mg  0.25-5 mg IntraVENous Multiple  simethicone (MYLICON) 59SW/6.1HB oral drops 80 mg  1.2 mL Oral Multiple  atropine injection 0.5 mg  0.5 mg IntraVENous ONCE PRN  
 EPINEPHrine (ADRENALIN) 0.1 mg/mL syringe 1 mg  1 mg Endoscopically ONCE PRN  
 insulin glargine (LANTUS) injection 30 Units  30 Units SubCUTAneous DAILY  ampicillin-sulbactam (UNASYN) 3 g in 0.9% sodium chloride (MBP/ADV) 100 mL  3 g IntraVENous Q6H  
 amLODIPine (NORVASC) tablet 5 mg  5 mg Oral DAILY  metoprolol tartrate (LOPRESSOR) tablet 25 mg  25 mg Oral Q12H  
 sodium chloride (NS) flush 5-10 mL  5-10 mL IntraVENous Q8H  
 sodium chloride (NS) flush 5-10 mL  5-10 mL IntraVENous PRN  
 ondansetron (ZOFRAN) injection 4 mg  4 mg IntraVENous Q4H PRN  
 insulin lispro (HUMALOG) injection   SubCUTAneous Q6H  
 glucose chewable tablet 16 g  4 Tab Oral PRN  
 dextrose (D50W) injection syrg 12.5-25 g  12.5-25 g IntraVENous PRN  
 glucagon (GLUCAGEN) injection 1 mg  1 mg IntraMUSCular PRN  
 influenza vaccine 2018-19 (6 mos+)(PF) (FLUARIX QUAD/FLULAVAL QUAD) injection 0.5 mL  0.5 mL IntraMUSCular PRIOR TO DISCHARGE  
 HYDROmorphone (PF) (DILAUDID) injection 1 mg  1 mg IntraVENous Q4H PRN Objective: No intake/output data recorded. 11/09 1901 - 11/11 0700 In: 26 [P.O.:20; I.V.:5340] Out: 900 [Urine:900] Patient Vitals for the past 8 hrs: BP Temp Pulse Resp SpO2  
11/11/18 0842 136/76 98.8 °F (37.1 °C) 94 16 91 % 11/11/18 0433 113/69 98.5 °F (36.9 °C) 88 20 96 % Physical Exam: 
General: Alert, cooperative, no distress, appears stated age. Neck:  Supple, symmetrical, trachea midline, no adenopathy, thyroid: no                           enlargement/tenderness/nodules, no carotid bruit and no JVD. Lungs: Clear to auscultation bilaterally. Heart:  Regular rate and rhythm, S1, S2 normal, no murmur, click, rub or gallop. Abdomen: Soft, non-tender. Bowel sounds normal. No masses,  No organomegaly. Extremities: Extremities normal, atraumatic, no cyanosis or edema. Skin:  Skin color, texture, turgor normal. No rashes or lesions Labs:  
Recent Labs 11/11/18 
6621 WBC 17.2* HGB 12.0*  
HCT 35.5*  Recent Labs 11/11/18 
0566   
K 3.6  CO2 33* * BUN 19  
CREA 1.20 CA 7.3*  
MG 2.1 PHOS 1.4* ALB 2.4* TBILI 3.0*  
SGOT 56* * X-ray Assessment / Plan:  
· His white count and bilirubin are coming down · His pain is controlled ·  awaiting HIDA scan · Plan for lap sindy in am by Dr Jamie Hull · Keep NPO after midnight tonight · He can have a regular diet after HIDA today Principal Problem: 
  Gallstone pancreatitis (11/8/2018) Active Problems: 
  Diabetes mellitus type 2, controlled ([facility] [address] (2/16/2013) Obesity, unspecified (2/16/2013) Other hyperlipidemia (2/16/2013) Essential hypertension (2/16/2013) Pancreatitis (11/8/2018) Hypokalemia (11/8/2018) ARF (acute renal failure) ([address] (11/8/2018) Abnormal LFTs (11/8/2018) Cholelithiasis (11/8/2018) Hyperbilirubinemia (11/9/2018)

## 2018-11-12 ENCOUNTER — ANESTHESIA (OUTPATIENT)
Dept: SURGERY | Age: 53
DRG: 417 | End: 2018-11-12
Payer: SELF-PAY

## 2018-11-12 ENCOUNTER — ANESTHESIA EVENT (OUTPATIENT)
Dept: SURGERY | Age: 53
DRG: 417 | End: 2018-11-12
Payer: SELF-PAY

## 2018-11-12 LAB
ALBUMIN SERPL-MCNC: 2.2 G/DL (ref 3.5–5)
ALBUMIN/GLOB SERPL: 0.5 {RATIO} (ref 1.1–2.2)
ALP SERPL-CCNC: 216 U/L (ref 45–117)
ALT SERPL-CCNC: 145 U/L (ref 12–78)
ANION GAP SERPL CALC-SCNC: 7 MMOL/L (ref 5–15)
AST SERPL-CCNC: 32 U/L (ref 15–37)
BILIRUB SERPL-MCNC: 1.9 MG/DL (ref 0.2–1)
BUN SERPL-MCNC: 15 MG/DL (ref 6–20)
BUN/CREAT SERPL: 13 (ref 12–20)
CALCIUM SERPL-MCNC: 7.9 MG/DL (ref 8.5–10.1)
CHLORIDE SERPL-SCNC: 101 MMOL/L (ref 97–108)
CO2 SERPL-SCNC: 30 MMOL/L (ref 21–32)
CREAT SERPL-MCNC: 1.12 MG/DL (ref 0.7–1.3)
ERYTHROCYTE [DISTWIDTH] IN BLOOD BY AUTOMATED COUNT: 16.7 % (ref 11.5–14.5)
FERRITIN SERPL-MCNC: 784 NG/ML (ref 26–388)
FOLATE SERPL-MCNC: 11.4 NG/ML (ref 5–21)
GLOBULIN SER CALC-MCNC: 4.4 G/DL (ref 2–4)
GLUCOSE BLD STRIP.AUTO-MCNC: 112 MG/DL (ref 65–100)
GLUCOSE BLD STRIP.AUTO-MCNC: 129 MG/DL (ref 65–100)
GLUCOSE BLD STRIP.AUTO-MCNC: 146 MG/DL (ref 65–100)
GLUCOSE BLD STRIP.AUTO-MCNC: 162 MG/DL (ref 65–100)
GLUCOSE SERPL-MCNC: 159 MG/DL (ref 65–100)
HAPTOGLOB SERPL-MCNC: 298 MG/DL (ref 30–200)
HCT VFR BLD AUTO: 36.2 % (ref 36.6–50.3)
HGB BLD-MCNC: 12.2 G/DL (ref 12.1–17)
IRON SATN MFR SERPL: 14 % (ref 20–50)
IRON SERPL-MCNC: 36 UG/DL (ref 35–150)
LDH SERPL L TO P-CCNC: 421 U/L (ref 85–241)
MAGNESIUM SERPL-MCNC: 2.3 MG/DL (ref 1.6–2.4)
MCH RBC QN AUTO: 29.5 PG (ref 26–34)
MCHC RBC AUTO-ENTMCNC: 33.7 G/DL (ref 30–36.5)
MCV RBC AUTO: 87.7 FL (ref 80–99)
NRBC # BLD: 0.02 K/UL (ref 0–0.01)
NRBC BLD-RTO: 0.1 PER 100 WBC
PHOSPHATE SERPL-MCNC: 1.8 MG/DL (ref 2.6–4.7)
PLATELET # BLD AUTO: 267 K/UL (ref 150–400)
PMV BLD AUTO: 11.1 FL (ref 8.9–12.9)
POTASSIUM SERPL-SCNC: 3.5 MMOL/L (ref 3.5–5.1)
PROT SERPL-MCNC: 6.6 G/DL (ref 6.4–8.2)
RBC # BLD AUTO: 4.13 M/UL (ref 4.1–5.7)
RETICS # AUTO: 0.09 M/UL (ref 0.03–0.1)
RETICS/RBC NFR AUTO: 2.3 % (ref 0.7–2.1)
SERVICE CMNT-IMP: ABNORMAL
SODIUM SERPL-SCNC: 138 MMOL/L (ref 136–145)
TIBC SERPL-MCNC: 256 UG/DL (ref 250–450)
VIT B12 SERPL-MCNC: 432 PG/ML (ref 193–986)
WBC # BLD AUTO: 16.4 K/UL (ref 4.1–11.1)

## 2018-11-12 PROCEDURE — 77030013079 HC BLNKT BAIR HGGR 3M -A: Performed by: NURSE ANESTHETIST, CERTIFIED REGISTERED

## 2018-11-12 PROCEDURE — 83735 ASSAY OF MAGNESIUM: CPT

## 2018-11-12 PROCEDURE — 77030026438 HC STYL ET INTUB CARD -A: Performed by: NURSE ANESTHETIST, CERTIFIED REGISTERED

## 2018-11-12 PROCEDURE — 84100 ASSAY OF PHOSPHORUS: CPT

## 2018-11-12 PROCEDURE — 77030020053 HC ELECTRD LAPSCP COVD -B: Performed by: SURGERY

## 2018-11-12 PROCEDURE — 74011000250 HC RX REV CODE- 250: Performed by: INTERNAL MEDICINE

## 2018-11-12 PROCEDURE — 77030010517 HC APPL SEAL FLOSEL BAXT -B: Performed by: SURGERY

## 2018-11-12 PROCEDURE — 74011636637 HC RX REV CODE- 636/637: Performed by: INTERNAL MEDICINE

## 2018-11-12 PROCEDURE — 36415 COLL VENOUS BLD VENIPUNCTURE: CPT

## 2018-11-12 PROCEDURE — 77030039266 HC ADH SKN EXOFIN S2SG -A: Performed by: SURGERY

## 2018-11-12 PROCEDURE — 76210000000 HC OR PH I REC 2 TO 2.5 HR: Performed by: SURGERY

## 2018-11-12 PROCEDURE — 65270000029 HC RM PRIVATE

## 2018-11-12 PROCEDURE — 77030010939 HC CLP LIG TELE -B: Performed by: SURGERY

## 2018-11-12 PROCEDURE — 77030010935 HC CLP LIG ABSRB TELE -B: Performed by: SURGERY

## 2018-11-12 PROCEDURE — 77030032490 HC SLV COMPR SCD KNE COVD -B: Performed by: SURGERY

## 2018-11-12 PROCEDURE — 76010000132 HC OR TIME 2.5 TO 3 HR: Performed by: SURGERY

## 2018-11-12 PROCEDURE — 77030011640 HC PAD GRND REM COVD -A: Performed by: SURGERY

## 2018-11-12 PROCEDURE — 77030009852 HC PCH RTVR ENDOSC COVD -B: Performed by: SURGERY

## 2018-11-12 PROCEDURE — 82962 GLUCOSE BLOOD TEST: CPT

## 2018-11-12 PROCEDURE — 77030008684 HC TU ET CUF COVD -B: Performed by: NURSE ANESTHETIST, CERTIFIED REGISTERED

## 2018-11-12 PROCEDURE — 77030035051: Performed by: SURGERY

## 2018-11-12 PROCEDURE — 85045 AUTOMATED RETICULOCYTE COUNT: CPT

## 2018-11-12 PROCEDURE — 74011250636 HC RX REV CODE- 250/636

## 2018-11-12 PROCEDURE — 77030002933 HC SUT MCRYL J&J -A: Performed by: SURGERY

## 2018-11-12 PROCEDURE — 85027 COMPLETE CBC AUTOMATED: CPT

## 2018-11-12 PROCEDURE — 94760 N-INVAS EAR/PLS OXIMETRY 1: CPT

## 2018-11-12 PROCEDURE — 77030032060 HC PWDR HEMSTAT ARISTA ASRB 3GM BARD -C: Performed by: SURGERY

## 2018-11-12 PROCEDURE — 82746 ASSAY OF FOLIC ACID SERUM: CPT

## 2018-11-12 PROCEDURE — 87205 SMEAR GRAM STAIN: CPT

## 2018-11-12 PROCEDURE — 83010 ASSAY OF HAPTOGLOBIN QUANT: CPT

## 2018-11-12 PROCEDURE — 74011000250 HC RX REV CODE- 250: Performed by: SURGERY

## 2018-11-12 PROCEDURE — 77010033678 HC OXYGEN DAILY

## 2018-11-12 PROCEDURE — 77030014647 HC SEAL FBRN TISSL BAXT -D: Performed by: SURGERY

## 2018-11-12 PROCEDURE — 74011250636 HC RX REV CODE- 250/636: Performed by: INTERNAL MEDICINE

## 2018-11-12 PROCEDURE — 82728 ASSAY OF FERRITIN: CPT

## 2018-11-12 PROCEDURE — 77030019908 HC STETH ESOPH SIMS -A: Performed by: NURSE ANESTHETIST, CERTIFIED REGISTERED

## 2018-11-12 PROCEDURE — 77030022952 HC TRCR ENDOSC COVD -C: Performed by: SURGERY

## 2018-11-12 PROCEDURE — 0FT44ZZ RESECTION OF GALLBLADDER, PERCUTANEOUS ENDOSCOPIC APPROACH: ICD-10-PCS | Performed by: SURGERY

## 2018-11-12 PROCEDURE — 77030011244 HC DRN WND HUBLS J&J -B: Performed by: SURGERY

## 2018-11-12 PROCEDURE — 77030020747 HC TU INSUF ENDOSC TELE -A: Performed by: SURGERY

## 2018-11-12 PROCEDURE — 74011250636 HC RX REV CODE- 250/636: Performed by: SURGERY

## 2018-11-12 PROCEDURE — 76060000036 HC ANESTHESIA 2.5 TO 3 HR: Performed by: SURGERY

## 2018-11-12 PROCEDURE — 77030035048 HC TRCR ENDOSC OPTCL COVD -B: Performed by: SURGERY

## 2018-11-12 PROCEDURE — 77030020263 HC SOL INJ SOD CL0.9% LFCR 1000ML: Performed by: SURGERY

## 2018-11-12 PROCEDURE — 88304 TISSUE EXAM BY PATHOLOGIST: CPT

## 2018-11-12 PROCEDURE — 74011000250 HC RX REV CODE- 250: Performed by: ANESTHESIOLOGY

## 2018-11-12 PROCEDURE — 74011250637 HC RX REV CODE- 250/637: Performed by: INTERNAL MEDICINE

## 2018-11-12 PROCEDURE — 82607 VITAMIN B-12: CPT

## 2018-11-12 PROCEDURE — 77030018836 HC SOL IRR NACL ICUM -A: Performed by: SURGERY

## 2018-11-12 PROCEDURE — 87075 CULTR BACTERIA EXCEPT BLOOD: CPT

## 2018-11-12 PROCEDURE — 74011000250 HC RX REV CODE- 250

## 2018-11-12 PROCEDURE — 74011000258 HC RX REV CODE- 258

## 2018-11-12 PROCEDURE — 83540 ASSAY OF IRON: CPT

## 2018-11-12 PROCEDURE — 74011000258 HC RX REV CODE- 258: Performed by: SURGERY

## 2018-11-12 PROCEDURE — 74011000258 HC RX REV CODE- 258: Performed by: INTERNAL MEDICINE

## 2018-11-12 PROCEDURE — 80053 COMPREHEN METABOLIC PANEL: CPT

## 2018-11-12 PROCEDURE — 83615 LACTATE (LD) (LDH) ENZYME: CPT

## 2018-11-12 PROCEDURE — 77030013567 HC DRN WND RESERV BARD -A: Performed by: SURGERY

## 2018-11-12 PROCEDURE — 77030037032 HC INSRT SCIS CLICKLLINE DISP STOR -B: Performed by: SURGERY

## 2018-11-12 PROCEDURE — 77030002966 HC SUT PDS J&J -A: Performed by: SURGERY

## 2018-11-12 RX ORDER — LIDOCAINE HYDROCHLORIDE 10 MG/ML
0.1 INJECTION, SOLUTION EPIDURAL; INFILTRATION; INTRACAUDAL; PERINEURAL AS NEEDED
Status: DISCONTINUED | OUTPATIENT
Start: 2018-11-12 | End: 2018-11-12 | Stop reason: HOSPADM

## 2018-11-12 RX ORDER — SODIUM CHLORIDE 0.9 % (FLUSH) 0.9 %
5-10 SYRINGE (ML) INJECTION EVERY 8 HOURS
Status: DISCONTINUED | OUTPATIENT
Start: 2018-11-12 | End: 2018-11-18 | Stop reason: HOSPADM

## 2018-11-12 RX ORDER — MIDAZOLAM HYDROCHLORIDE 1 MG/ML
INJECTION, SOLUTION INTRAMUSCULAR; INTRAVENOUS AS NEEDED
Status: DISCONTINUED | OUTPATIENT
Start: 2018-11-12 | End: 2018-11-12 | Stop reason: HOSPADM

## 2018-11-12 RX ORDER — ROCURONIUM BROMIDE 10 MG/ML
INJECTION, SOLUTION INTRAVENOUS AS NEEDED
Status: DISCONTINUED | OUTPATIENT
Start: 2018-11-12 | End: 2018-11-12 | Stop reason: HOSPADM

## 2018-11-12 RX ORDER — OXYCODONE AND ACETAMINOPHEN 5; 325 MG/1; MG/1
1 TABLET ORAL
Status: DISCONTINUED | OUTPATIENT
Start: 2018-11-12 | End: 2018-11-14

## 2018-11-12 RX ORDER — SODIUM CHLORIDE 0.9 % (FLUSH) 0.9 %
5-10 SYRINGE (ML) INJECTION AS NEEDED
Status: DISCONTINUED | OUTPATIENT
Start: 2018-11-12 | End: 2018-11-18 | Stop reason: HOSPADM

## 2018-11-12 RX ORDER — SODIUM CHLORIDE, SODIUM LACTATE, POTASSIUM CHLORIDE, CALCIUM CHLORIDE 600; 310; 30; 20 MG/100ML; MG/100ML; MG/100ML; MG/100ML
125 INJECTION, SOLUTION INTRAVENOUS CONTINUOUS
Status: DISCONTINUED | OUTPATIENT
Start: 2018-11-12 | End: 2018-11-13

## 2018-11-12 RX ORDER — DIPHENHYDRAMINE HYDROCHLORIDE 50 MG/ML
12.5 INJECTION, SOLUTION INTRAMUSCULAR; INTRAVENOUS AS NEEDED
Status: DISCONTINUED | OUTPATIENT
Start: 2018-11-12 | End: 2018-11-12 | Stop reason: HOSPADM

## 2018-11-12 RX ORDER — SODIUM CHLORIDE, SODIUM LACTATE, POTASSIUM CHLORIDE, CALCIUM CHLORIDE 600; 310; 30; 20 MG/100ML; MG/100ML; MG/100ML; MG/100ML
125 INJECTION, SOLUTION INTRAVENOUS CONTINUOUS
Status: DISCONTINUED | OUTPATIENT
Start: 2018-11-12 | End: 2018-11-12 | Stop reason: HOSPADM

## 2018-11-12 RX ORDER — NALOXONE HYDROCHLORIDE 0.4 MG/ML
0.04 INJECTION, SOLUTION INTRAMUSCULAR; INTRAVENOUS; SUBCUTANEOUS
Status: DISCONTINUED | OUTPATIENT
Start: 2018-11-12 | End: 2018-11-12 | Stop reason: HOSPADM

## 2018-11-12 RX ORDER — OXYCODONE AND ACETAMINOPHEN 10; 325 MG/1; MG/1
1 TABLET ORAL
Status: DISCONTINUED | OUTPATIENT
Start: 2018-11-12 | End: 2018-11-14

## 2018-11-12 RX ORDER — FLUMAZENIL 0.1 MG/ML
0.2 INJECTION INTRAVENOUS
Status: DISCONTINUED | OUTPATIENT
Start: 2018-11-12 | End: 2018-11-12 | Stop reason: HOSPADM

## 2018-11-12 RX ORDER — PROPOFOL 10 MG/ML
INJECTION, EMULSION INTRAVENOUS AS NEEDED
Status: DISCONTINUED | OUTPATIENT
Start: 2018-11-12 | End: 2018-11-12 | Stop reason: HOSPADM

## 2018-11-12 RX ORDER — SODIUM CHLORIDE 9 MG/ML
INJECTION, SOLUTION INTRAVENOUS
Status: DISCONTINUED | OUTPATIENT
Start: 2018-11-12 | End: 2018-11-12 | Stop reason: HOSPADM

## 2018-11-12 RX ORDER — ONDANSETRON 2 MG/ML
4 INJECTION INTRAMUSCULAR; INTRAVENOUS
Status: DISCONTINUED | OUTPATIENT
Start: 2018-11-12 | End: 2018-11-18 | Stop reason: HOSPADM

## 2018-11-12 RX ORDER — LABETALOL HYDROCHLORIDE 5 MG/ML
5 INJECTION, SOLUTION INTRAVENOUS ONCE
Status: COMPLETED | OUTPATIENT
Start: 2018-11-12 | End: 2018-11-12

## 2018-11-12 RX ORDER — SODIUM CHLORIDE 0.9 % (FLUSH) 0.9 %
5-10 SYRINGE (ML) INJECTION AS NEEDED
Status: DISCONTINUED | OUTPATIENT
Start: 2018-11-12 | End: 2018-11-12 | Stop reason: HOSPADM

## 2018-11-12 RX ORDER — ONDANSETRON 2 MG/ML
INJECTION INTRAMUSCULAR; INTRAVENOUS AS NEEDED
Status: DISCONTINUED | OUTPATIENT
Start: 2018-11-12 | End: 2018-11-12 | Stop reason: HOSPADM

## 2018-11-12 RX ORDER — FENTANYL CITRATE 50 UG/ML
INJECTION, SOLUTION INTRAMUSCULAR; INTRAVENOUS AS NEEDED
Status: DISCONTINUED | OUTPATIENT
Start: 2018-11-12 | End: 2018-11-12 | Stop reason: HOSPADM

## 2018-11-12 RX ORDER — HYDROMORPHONE HYDROCHLORIDE 2 MG/ML
0.5 INJECTION, SOLUTION INTRAMUSCULAR; INTRAVENOUS; SUBCUTANEOUS
Status: DISCONTINUED | OUTPATIENT
Start: 2018-11-12 | End: 2018-11-12 | Stop reason: HOSPADM

## 2018-11-12 RX ADMIN — Medication 10 ML: at 06:59

## 2018-11-12 RX ADMIN — AMPICILLIN SODIUM AND SULBACTAM SODIUM 3 G: 2; 1 INJECTION, POWDER, FOR SOLUTION INTRAMUSCULAR; INTRAVENOUS at 14:56

## 2018-11-12 RX ADMIN — FENTANYL CITRATE 100 MCG: 50 INJECTION, SOLUTION INTRAMUSCULAR; INTRAVENOUS at 14:31

## 2018-11-12 RX ADMIN — MIDAZOLAM HYDROCHLORIDE 2 MG: 1 INJECTION, SOLUTION INTRAMUSCULAR; INTRAVENOUS at 14:22

## 2018-11-12 RX ADMIN — HYDROMORPHONE HYDROCHLORIDE 1 MG: 2 INJECTION, SOLUTION INTRAMUSCULAR; INTRAVENOUS; SUBCUTANEOUS at 08:29

## 2018-11-12 RX ADMIN — FENTANYL CITRATE 50 MCG: 50 INJECTION, SOLUTION INTRAMUSCULAR; INTRAVENOUS at 15:06

## 2018-11-12 RX ADMIN — PIPERACILLIN SODIUM,TAZOBACTAM SODIUM 3.38 G: 3; .375 INJECTION, POWDER, FOR SOLUTION INTRAVENOUS at 19:01

## 2018-11-12 RX ADMIN — AMLODIPINE BESYLATE 5 MG: 5 TABLET ORAL at 09:21

## 2018-11-12 RX ADMIN — INSULIN LISPRO 2 UNITS: 100 INJECTION, SOLUTION INTRAVENOUS; SUBCUTANEOUS at 00:34

## 2018-11-12 RX ADMIN — SODIUM CHLORIDE AND POTASSIUM CHLORIDE: 4.5; 1.49 INJECTION, SOLUTION INTRAVENOUS at 02:07

## 2018-11-12 RX ADMIN — SODIUM CHLORIDE: 900 INJECTION, SOLUTION INTRAVENOUS at 12:05

## 2018-11-12 RX ADMIN — ROCURONIUM BROMIDE 5 MG: 10 INJECTION, SOLUTION INTRAVENOUS at 15:08

## 2018-11-12 RX ADMIN — LABETALOL HYDROCHLORIDE 5 MG: 5 INJECTION INTRAVENOUS at 18:03

## 2018-11-12 RX ADMIN — HYDROMORPHONE HYDROCHLORIDE 1 MG: 2 INJECTION, SOLUTION INTRAMUSCULAR; INTRAVENOUS; SUBCUTANEOUS at 12:05

## 2018-11-12 RX ADMIN — Medication 10 ML: at 22:48

## 2018-11-12 RX ADMIN — METOPROLOL TARTRATE 25 MG: 25 TABLET ORAL at 22:48

## 2018-11-12 RX ADMIN — HYDROMORPHONE HYDROCHLORIDE 1 MG: 2 INJECTION, SOLUTION INTRAMUSCULAR; INTRAVENOUS; SUBCUTANEOUS at 22:48

## 2018-11-12 RX ADMIN — INSULIN LISPRO 2 UNITS: 100 INJECTION, SOLUTION INTRAVENOUS; SUBCUTANEOUS at 12:52

## 2018-11-12 RX ADMIN — PROPOFOL 200 MG: 10 INJECTION, EMULSION INTRAVENOUS at 14:31

## 2018-11-12 RX ADMIN — SODIUM CHLORIDE, SODIUM LACTATE, POTASSIUM CHLORIDE, AND CALCIUM CHLORIDE 125 ML/HR: 600; 310; 30; 20 INJECTION, SOLUTION INTRAVENOUS at 17:13

## 2018-11-12 RX ADMIN — ONDANSETRON 4 MG: 2 INJECTION INTRAMUSCULAR; INTRAVENOUS at 15:55

## 2018-11-12 RX ADMIN — FENTANYL CITRATE 50 MCG: 50 INJECTION, SOLUTION INTRAMUSCULAR; INTRAVENOUS at 14:22

## 2018-11-12 RX ADMIN — SODIUM CHLORIDE: 9 INJECTION, SOLUTION INTRAVENOUS at 14:22

## 2018-11-12 RX ADMIN — METOPROLOL TARTRATE 25 MG: 25 TABLET ORAL at 09:21

## 2018-11-12 RX ADMIN — AMPICILLIN SODIUM AND SULBACTAM SODIUM 3 G: 2; 1 INJECTION, POWDER, FOR SOLUTION INTRAMUSCULAR; INTRAVENOUS at 09:21

## 2018-11-12 RX ADMIN — HYDROMORPHONE HYDROCHLORIDE 1 MG: 2 INJECTION, SOLUTION INTRAMUSCULAR; INTRAVENOUS; SUBCUTANEOUS at 03:33

## 2018-11-12 RX ADMIN — INSULIN GLARGINE 15 UNITS: 100 INJECTION, SOLUTION SUBCUTANEOUS at 09:20

## 2018-11-12 RX ADMIN — ROCURONIUM BROMIDE 35 MG: 10 INJECTION, SOLUTION INTRAVENOUS at 14:31

## 2018-11-12 RX ADMIN — FENTANYL CITRATE 50 MCG: 50 INJECTION, SOLUTION INTRAMUSCULAR; INTRAVENOUS at 14:30

## 2018-11-12 RX ADMIN — AMPICILLIN SODIUM AND SULBACTAM SODIUM 3 G: 2; 1 INJECTION, POWDER, FOR SOLUTION INTRAMUSCULAR; INTRAVENOUS at 02:12

## 2018-11-12 NOTE — PROGRESS NOTES
Medical Progress Note NAME: Vin Dubois :  1965 MRM:  [id number] Date/Time: 2018  10:38 AM 
  
Assessment / Plan:  
 
Sepsis:  Resolved. WBC much improved. -- continue unasyn for now 
  
Diabetes mellitus type 2, controlled:  Glucose well controlled. A1c 6.9 in July. -- resume metformin at DC 
-- continue lantus for now Gallstone pancreatitis / Cholelithiasis:  S/P ERCP w/o stone found. LFT and lipase improved. Pain of RUQ persists. -- lap sindy planned today 
  
Hypophosphatemia: 
-- replete with IV K-phos 
  
Essential hypertension: 
-- continue amlodipine and metoprolol 
  
ARF (acute renal failure):  Resolved with IVF. 
  
Obesity, unspecified:  Advise weight loss. 
  
Other hyperlipidemia:   
-- hold statin until LFT improved 
  
 
 
  
Subjective: Chief Complaint:  abd pain. Has ruq abd pains. No n/v.  Pain was worse overnight, but improved this am. 
 
ROS: 
(bold if positive, if negative) Abd Pain Objective:  
 
 
Vitals:  
 
 
  
Last 24hrs VS reviewed since prior progress note. Most recent are: 
 
Visit Vitals /80 (BP 1 Location: Left arm, BP Patient Position: At rest) Pulse 92 Temp 98.2 °F (36.8 °C) Resp 16 Ht 5' 8\" (1.727 m) Wt 110.2 kg (243 lb) SpO2 92% BMI 36.95 kg/m² SpO2 Readings from Last 6 Encounters:  
18 92% O2 Flow Rate (L/min): 4 l/min Intake/Output Summary (Last 24 hours) at 2018 1038 Last data filed at 2018 3177 Gross per 24 hour Intake 1982.5 ml Output  Net 1982.5 ml Exam:  
 
Physical Exam: 
 
Gen:  Well-developed, well-nourished, in no acute distress HEENT:  Pink conjunctivae, hearing intact to voice, moist mucous membranes Resp:  No accessory muscle use, clear breath sounds without wheezes rales or rhonchi 
Card:  No murmurs, normal S1, S2 without thrills or peripheral edema Abd:  Soft, RUQ tender, non-distended, normoactive bowel sounds are present Skin:  No rashes Neuro: Face symmetric, tongue midline, speech fluent,  strength is 5/5 bilaterally and dorsi / plantarflexion is 5/5 bilaterally, follows commands appropriately Psych:  Good insight, alert Medications Reviewed: (see below) Lab Data Reviewed: (see below) 
 
______________________________________________________________________ Medications:  
 
Current Facility-Administered Medications Medication Dose Route Frequency  potassium phosphate 30 mmol in 0.9% sodium chloride 250 mL infusion   IntraVENous ONCE  
 benzocaine-menthol (CEPACOL) lozenge 1 Lozenge  1 Lozenge Mucous Membrane PRN  
 0.45% sodium chloride with KCl 20 mEq/L infusion   IntraVENous CONTINUOUS  
 midazolam (VERSED) injection 0.25-5 mg  0.25-5 mg IntraVENous Multiple  simethicone (MYLICON) 16GF/0.2OH oral drops 80 mg  1.2 mL Oral Multiple  insulin glargine (LANTUS) injection 30 Units  30 Units SubCUTAneous DAILY  ampicillin-sulbactam (UNASYN) 3 g in 0.9% sodium chloride (MBP/ADV) 100 mL  3 g IntraVENous Q6H  
 amLODIPine (NORVASC) tablet 5 mg  5 mg Oral DAILY  metoprolol tartrate (LOPRESSOR) tablet 25 mg  25 mg Oral Q12H  
 sodium chloride (NS) flush 5-10 mL  5-10 mL IntraVENous Q8H  
 sodium chloride (NS) flush 5-10 mL  5-10 mL IntraVENous PRN  
 ondansetron (ZOFRAN) injection 4 mg  4 mg IntraVENous Q4H PRN  
 insulin lispro (HUMALOG) injection   SubCUTAneous Q6H  
 glucose chewable tablet 16 g  4 Tab Oral PRN  
 dextrose (D50W) injection syrg 12.5-25 g  12.5-25 g IntraVENous PRN  
 glucagon (GLUCAGEN) injection 1 mg  1 mg IntraMUSCular PRN  
 influenza vaccine 2018-19 (6 mos+)(PF) (FLUARIX QUAD/FLULAVAL QUAD) injection 0.5 mL  0.5 mL IntraMUSCular PRIOR TO DISCHARGE  
 HYDROmorphone (PF) (DILAUDID) injection 1 mg  1 mg IntraVENous Q4H PRN Lab Review:  
 
Recent Labs 11/12/18 
2649 11/11/18 
0328 11/10/18 
8952 WBC 16.4* 17.2* 22.3* HGB 12.2 12.0* 14.1 HCT 36.2* 35.5* 40.9  230 244 Recent Labs 11/12/18 2004 11/11/18 0328 11/10/18 
9024  140 140  
K 3.5 3.6 3.2*  
 103 100 CO2 30 33* 33* * 157* 219* BUN 15 19 17 CREA 1.12 1.20 1.33* CA 7.9* 7.3* 8.3*  
MG 2.3 2.1 1.8 PHOS 1.8* 1.4* 1.3* ALB 2.2* 2.4* 2.7* TBILI 1.9* 3.0* 10.9* SGOT 32 56* 141* * 238* 360* Lab Results Component Value Date/Time Glucose (POC) 129 (H) 11/12/2018 06:58 AM  
 Glucose (POC) 162 (H) 11/12/2018 12:21 AM  
 Glucose (POC) 133 (H) 11/11/2018 05:52 PM  
 Glucose (POC) 145 (H) 11/11/2018 11:12 AM  
 Glucose (POC) 146 (H) 11/11/2018 07:47 AM  
 
 
 
Total time spent with patient: 25 Minutes Care Plan discussed with: Patient and Family Discussed:  Care Plan Disposition:  Home w/Family 
        
___________________________________________________ Attending Physician: Alessandra Montanez MD

## 2018-11-12 NOTE — PROGRESS NOTES
6135- Bedside and Verbal shift change report given to [facility] (oncoming nurse) by Deneen Prasad (offgoing nurse). Report included the following information SBAR, Kardex, Intake/Output, MAR and Recent Results. Pt observed in bed, resting quietly, on room air, states some abd pain at this time. Will assess. 1126- Call placed to Dr. Radha Yancey regarding patient NPO status and upcoming meds. Stated OK to give AM oral meds and to give 15 Units of Lantus rather than the 30 units.

## 2018-11-12 NOTE — PROGRESS NOTES
Bedside and Verbal shift change report given to Jennifer Odom (oncoming nurse) by Kaity Mirza (offgoing nurse). Report included the following information SBAR, Kardex, ED Summary, Intake/Output, MAR, Accordion and Recent Results.

## 2018-11-12 NOTE — PROGRESS NOTES
Gastrointestinal Progress Note 11/12/2018 Admit Date: 11/8/2018 Subjective:  
 
New Complaints Today: States \"had a rough night\". Had a lot of pain after HIDA scan. Pain located in upper abdominal region. Denies nausea or vomiting. Improves with pain medication. Feeling better this AM. Bleeding:  None Current Facility-Administered Medications Medication Dose Route Frequency  potassium phosphate 30 mmol in 0.9% sodium chloride 250 mL infusion   IntraVENous ONCE  
 benzocaine-menthol (CEPACOL) lozenge 1 Lozenge  1 Lozenge Mucous Membrane PRN  
 0.45% sodium chloride with KCl 20 mEq/L infusion   IntraVENous CONTINUOUS  
 midazolam (VERSED) injection 0.25-5 mg  0.25-5 mg IntraVENous Multiple  simethicone (MYLICON) 13ED/0.4KW oral drops 80 mg  1.2 mL Oral Multiple  insulin glargine (LANTUS) injection 30 Units  30 Units SubCUTAneous DAILY  ampicillin-sulbactam (UNASYN) 3 g in 0.9% sodium chloride (MBP/ADV) 100 mL  3 g IntraVENous Q6H  
 amLODIPine (NORVASC) tablet 5 mg  5 mg Oral DAILY  metoprolol tartrate (LOPRESSOR) tablet 25 mg  25 mg Oral Q12H  
 sodium chloride (NS) flush 5-10 mL  5-10 mL IntraVENous Q8H  
 sodium chloride (NS) flush 5-10 mL  5-10 mL IntraVENous PRN  
 ondansetron (ZOFRAN) injection 4 mg  4 mg IntraVENous Q4H PRN  
 insulin lispro (HUMALOG) injection   SubCUTAneous Q6H  
 glucose chewable tablet 16 g  4 Tab Oral PRN  
 dextrose (D50W) injection syrg 12.5-25 g  12.5-25 g IntraVENous PRN  
 glucagon (GLUCAGEN) injection 1 mg  1 mg IntraMUSCular PRN  
 influenza vaccine 2018-19 (6 mos+)(PF) (FLUARIX QUAD/FLULAVAL QUAD) injection 0.5 mL  0.5 mL IntraMUSCular PRIOR TO DISCHARGE  
 HYDROmorphone (PF) (DILAUDID) injection 1 mg  1 mg IntraVENous Q4H PRN Objective:  
 
Blood pressure 152/80, pulse 92, temperature 98.2 °F (36.8 °C), resp. rate 16, height 5' 8\" (1.727 m), weight 110.2 kg (243 lb), SpO2 92 %. 11/12 0701 - 11/12 1900 In: 982.5 [I.V.:982.5] Out: -  
 
11/10 1901 - 11/12 0700 In: 2970 [P.O.:220; I.V.:2750] Out: 250 [Urine:250] EXAM:  GENERAL: alert, cooperative, no distress, HEART: regular rate and rhythm, S1, S2 normal, no murmur, click, rub or gallop, LUNGS: chest clear, no wheezing, rales, normal symmetric air entry, ABDOMEN:  Bowel sounds present, soft, nondistended, mild epigastric and RUQ tenderness EXTREMITY: extremities normal, atraumatic, no cyanosis or edema Data Review Recent Results (from the past 24 hour(s)) GLUCOSE, POC Collection Time: 11/11/18 11:12 AM  
Result Value Ref Range Glucose (POC) 145 (H) 65 - 100 mg/dL Performed by Ilsa Hanson GLUCOSE, POC Collection Time: 11/11/18  5:52 PM  
Result Value Ref Range Glucose (POC) 133 (H) 65 - 100 mg/dL Performed by Prashant Langley (PCT) GLUCOSE, POC Collection Time: 11/12/18 12:21 AM  
Result Value Ref Range Glucose (POC) 162 (H) 65 - 100 mg/dL Performed by Jorge Funes GLUCOSE, POC Collection Time: 11/12/18  6:58 AM  
Result Value Ref Range Glucose (POC) 129 (H) 65 - 100 mg/dL Performed by Jorge Funes CBC W/O DIFF Collection Time: 11/12/18  7:36 AM  
Result Value Ref Range WBC 16.4 (H) 4.1 - 11.1 K/uL  
 RBC 4.13 4.10 - 5.70 M/uL  
 HGB 12.2 12.1 - 17.0 g/dL HCT 36.2 (L) 36.6 - 50.3 % MCV 87.7 80.0 - 99.0 FL  
 MCH 29.5 26.0 - 34.0 PG  
 MCHC 33.7 30.0 - 36.5 g/dL  
 RDW 16.7 (H) 11.5 - 14.5 % PLATELET 836 347 - 657 K/uL MPV 11.1 8.9 - 12.9 FL  
 NRBC 0.1 (H) 0  WBC ABSOLUTE NRBC 0.02 (H) 0.00 - 0.01 K/uL MAGNESIUM Collection Time: 11/12/18  7:36 AM  
Result Value Ref Range Magnesium 2.3 1.6 - 2.4 mg/dL PHOSPHORUS Collection Time: 11/12/18  7:36 AM  
Result Value Ref Range Phosphorus 1.8 (L) 2.6 - 4.7 MG/DL  
METABOLIC PANEL, COMPREHENSIVE Collection Time: 11/12/18  7:36 AM  
Result Value Ref Range Sodium 138 136 - 145 mmol/L  Potassium 3.5 3.5 - 5.1 mmol/L  
 Chloride 101 97 - 108 mmol/L  
 CO2 30 21 - 32 mmol/L Anion gap 7 5 - 15 mmol/L Glucose 159 (H) 65 - 100 mg/dL BUN 15 6 - 20 MG/DL Creatinine 1.12 0.70 - 1.30 MG/DL  
 BUN/Creatinine ratio 13 12 - 20 GFR est AA >60 >60 ml/min/1.73m2 GFR est non-AA >60 >60 ml/min/1.73m2 Calcium 7.9 (L) 8.5 - 10.1 MG/DL Bilirubin, total 1.9 (H) 0.2 - 1.0 MG/DL  
 ALT (SGPT) 145 (H) 12 - 78 U/L  
 AST (SGOT) 32 15 - 37 U/L Alk. phosphatase 216 (H) 45 - 117 U/L Protein, total 6.6 6.4 - 8.2 g/dL Albumin 2.2 (L) 3.5 - 5.0 g/dL Globulin 4.4 (H) 2.0 - 4.0 g/dL A-G Ratio 0.5 (L) 1.1 - 2.2 LD Collection Time: 11/12/18  7:36 AM  
Result Value Ref Range  (H) 85 - 241 U/L  
RETICULOCYTE COUNT Collection Time: 11/12/18  7:36 AM  
Result Value Ref Range Reticulocyte count 2.3 (H) 0.7 - 2.1 % Absolute Retic Cnt. 0.0946 0.0260 - 0.0950 M/ul Assessment:  
 
Principal Problem: 
  Gallstone pancreatitis (11/8/2018) Active Problems: 
  Diabetes mellitus type 2, controlled ([facility] [address] (2/16/2013) Obesity, unspecified (2/16/2013) Other hyperlipidemia (2/16/2013) Essential hypertension (2/16/2013) Pancreatitis (11/8/2018) Hypokalemia (11/8/2018) ARF (acute renal failure) ([facility] [address] (11/8/2018) Abnormal LFTs (11/8/2018) Cholelithiasis (11/8/2018) Hyperbilirubinemia (11/9/2018) LFTs continue to trend down after ERCP. HIDA scan shows cystic duct obstruction. Plan: 1.  OR today for cholecystectomy 2. No additional GI recommendations/procedures at this time Dex Sanchez PA-C 
11/12/18 
10:43 AM 
 
I have interviewed and examined patient with addendum to note above and formulation care plan to reflect my evaluation Elena tSanton M.D.

## 2018-11-12 NOTE — ANESTHESIA PREPROCEDURE EVALUATION
Anesthetic History Other anesthesia complications Comments: ,mild confusion postop after ERCP Review of Systems / Medical History Patient summary reviewed, nursing notes reviewed and pertinent labs reviewed Pulmonary Within defined limits Neuro/Psych Within defined limits Cardiovascular Hypertension GI/Hepatic/Renal 
  
 
 
Renal disease: ARF Liver disease Comments: CBD stone causing Pancreatitis Endo/Other Diabetes Morbid obesity Other Findings Physical Exam 
 
Airway Mallampati: III Neck ROM: normal range of motion Mouth opening: Normal 
 
 Cardiovascular Rhythm: regular Rate: normal 
 
 
 
 Dental 
No notable dental hx Pulmonary Breath sounds clear to auscultation Abdominal 
GI exam deferred Other Findings Anesthetic Plan ASA: 3 Anesthesia type: general 
 
 
 
 
Induction: Intravenous Anesthetic plan and risks discussed with: Patient

## 2018-11-12 NOTE — OP NOTES
Date: 11/12/18    Pre-Operative Diagnosis: Gallstone pancreatitis with choledocholithiasis     Post-Operative Diagnosis: Gallstone pancreatitis with acute cholecystitis      Procedure: Laparoscopic cholecystectomy     Surgeon: Aurelio Lantigua MD     Surgical Assistant: Gayla Castillo     Estimated Blood Loss: 100 cc     Findings: Inflamed gallbladder containing multiple stones. Specimen: Gallbladder. Gallbladder aspirate for culture. Complication: None    Implant: None     Indication: This is a 48year-old male with morbid obesity admitted with severe abdominal pain. Work-up demonstrated gallstone pancreatitis with hyperbilirubinemia. MRCP that showed choledocholithiasis. He underwent pre-operative ERCP with stone extraction. He recoved from that procedure, recommendation was for laparoscopic cholecystectomy.      Procedure: The patient was brought to the operating room and underwent general anesthesia and endotracheal intubation. All appropriate monitoring devices were placed. The patient was placed supine on the operating table. All pressure points were padded and then the abdomen was prepped and draped in the usual sterile fashion. A time out was completed verifying patient, procedure, site, positioning and any needed special equipment prior to beginning this procedure. Pre-incision antibiotics were given 30 minutes prior to skin incision. The laparoscopic camera and CO2 insufflation apparatus were affixed to the drapes in the usual fashion. Pre-incision 1% lidocaine with epinephrine and 0.5% bupivicaine anesthetic was injected at the infraumbilical incision. Through a horizontal infra-umbilical skin incision, the abdomen was entered via a direct cut-down, a 5-12 mm blunt tip balloon port was inserted. Insufflation was established satisfactorily and maintained at 15 mmHg.  The laparoscopic camera was introduced and it was confirmed that there was no intraabdominal visceral injury or bleeding in attaining access. Under direct laparoscopic vision, one epigastric and two right subcostal 5 mm ports were placed after injection of local anesthetic. An additional supra-umbilical 5 mm port was placed. The patient was positioned in reverse Trendelenburg with left tilt. The gallbladder was acutely inflamed and tense. It was needle aspirated under laparoscopic guidance, purulent material was aspirated. The fundus was grasped and lifted up towards the diaphragm. The infundibulum was retracted laterally and inferiorly. The cystic duct and artery were bluntly dissected free from the peritoneum and adjacent lymphatic tissue. The dissection was challenging due to the degree of inflammation from acute cholecystitis. The critical view of Calot's triangle was obtained and the structures were clearly identified. The dissection and procedure were made more difficult due to the patient's morbid obesity.      The cystic duct was clipped and divided with laparoscopic scissors. The cystic artery was clipped on both sides and divided with laparoscopic scissors. With electrocautery, the gallbladder was then gently dissected completely off from the liver bed and placed in a retrieval bag. There was disruption of the gallbladder with spillage of stones. The majority of these were retrieved or suctioned. Hemostasis was obtained with FloSeal and Pati. The 5mm ports were removed under direct laparoscopic vision, there was no preperitoneal, rectus arterial or venous bleeding. Additional local anesthesia was injected into all the port sites.      The 8-02 infra-umbilical fascial defect was closed with a 0-PDS. All skin incisions were closed with subcuticular 4-0 Monocryl, and Dermabond. The patient awoke in satisfactory condition. Sponge and instrument counts were correct x 2.  I directly went to discuss the findings with the patient's family in the waiting area.   Bart Knapp MD

## 2018-11-12 NOTE — PROGRESS NOTES
General Surgery Progress Note S: Continues to have upper abdominal pain requiring Dilaudid. ERCP over the weekend with improvement in hyperbilirubinemia. HIDA showed cystic duct obstruction, is on Unasyn. Patient Vitals for the past 24 hrs: 
 Temp Pulse Resp BP SpO2  
11/12/18 0508 98.2 °F (36.8 °C) 84 16 126/67 92 % 11/12/18 0042 98 °F (36.7 °C) 88 16 123/72 92 % 11/11/18 1930 98.6 °F (37 °C) 99 18 140/83 95 % 11/11/18 1716 99 °F (37.2 °C) 88 18 137/64 90 % 11/11/18 0842 98.8 °F (37.1 °C) 94 16 136/76 91 % Date 11/11/18 0700 - 11/12/18 2442 11/12/18 0700 - 11/13/18 5132 Shift 4281-4181 3858-0976 24 Hour Total 6390-9956 1767-8241 24 Hour Total  
INTAKE  
P.O. 200  200     
  P. O. 200  200     
I. V.(mL/kg/hr) 700(0.5) 200(0.2) 900(0.3) 982.5  982.5 Volume (0.45% sodium chloride with KCl 20 mEq/L infusion) 600 0 600 982.5  982.5 Volume (ampicillin-sulbactam (UNASYN) 3 g in 0.9% sodium chloride (MBP/ADV) 100 mL) 100 200 300 Shift Total(mL/kg) 900(8.2) 200(1.8) 1100(10) 982. 5(8.9)  982. 5(8.9) OUTPUT Urine(mL/kg/hr) Urine Occurrence(s) 2 x  2 x Shift Total(mL/kg)  033 9312 982.5  982.5 Weight (kg) 110.2 110.2 110.2 110.2 110.2 110.2 Physical Exam: 
 
 
General: NAD, A&Ox3 Eyes: + Scleral icterus Resp: non-labored CV: RRR Abdomen: soft, mildly tender in the epigastric region, moderately distended Extremity: no edema Lab Results Component Value Date/Time WBC 17.2 (H) 11/11/2018 03:28 AM  
 HGB 12.0 (L) 11/11/2018 03:28 AM  
 HCT 35.5 (L) 11/11/2018 03:28 AM  
 PLATELET 208 10/95/4654 03:28 AM  
 MCV 86.4 11/11/2018 03:28 AM  
 
 
Lab Results Component Value Date/Time  Sodium 140 11/11/2018 03:28 AM  
 Potassium 3.6 11/11/2018 03:28 AM  
 Chloride 103 11/11/2018 03:28 AM  
 CO2 33 (H) 11/11/2018 03:28 AM  
 Anion gap 4 (L) 11/11/2018 03:28 AM  
 Glucose 157 (H) 11/11/2018 03:28 AM  
 BUN 19 11/11/2018 03:28 AM  
 Creatinine 1.20 11/11/2018 03:28 AM  
 BUN/Creatinine ratio 16 11/11/2018 03:28 AM  
 GFR est AA >60 11/11/2018 03:28 AM  
 GFR est non-AA >60 11/11/2018 03:28 AM  
 Calcium 7.3 (L) 11/11/2018 03:28 AM  
 
  
Lab Results Component Value Date/Time ALT (SGPT) 238 (H) 11/11/2018 03:28 AM  
 AST (SGOT) 56 (H) 11/11/2018 03:28 AM  
 Alk. phosphatase 276 (H) 11/11/2018 03:28 AM  
 Bilirubin, direct 7.9 (H) 11/09/2018 01:57 AM  
 Bilirubin, total 3.0 (H) 11/11/2018 03:28 AM  
 
 
No results found for: INR, PTMR, PTP, PT1, PT2 
 
 
 
A/P: 
48year-old male with gallstone pancreatitis, MRCP with choledocholithiasis and worsening hyperbilirubinemia, s/p ERCP. HIDA with cystic duct obstruction. Laparoscopic cholecystectomy today, on schedule for 3 PM 
Risks discussed included the risk of bleeding, infection, hernia, bile leak, injury to intra-abdominal organs or blood vessels, common bile duct injury, conversion to open surgery. All questions answered. NPO Continue Socrates Drummond MD

## 2018-11-13 LAB
ALBUMIN SERPL-MCNC: 2.1 G/DL (ref 3.5–5)
ALBUMIN/GLOB SERPL: 0.5 {RATIO} (ref 1.1–2.2)
ALP SERPL-CCNC: 195 U/L (ref 45–117)
ALT SERPL-CCNC: 180 U/L (ref 12–78)
ANION GAP SERPL CALC-SCNC: 4 MMOL/L (ref 5–15)
AST SERPL-CCNC: 167 U/L (ref 15–37)
BILIRUB SERPL-MCNC: 1.4 MG/DL (ref 0.2–1)
BUN SERPL-MCNC: 13 MG/DL (ref 6–20)
BUN/CREAT SERPL: 10 (ref 12–20)
CALCIUM SERPL-MCNC: 7.6 MG/DL (ref 8.5–10.1)
CHLORIDE SERPL-SCNC: 101 MMOL/L (ref 97–108)
CO2 SERPL-SCNC: 30 MMOL/L (ref 21–32)
CREAT SERPL-MCNC: 1.29 MG/DL (ref 0.7–1.3)
ERYTHROCYTE [DISTWIDTH] IN BLOOD BY AUTOMATED COUNT: 16.7 % (ref 11.5–14.5)
GLOBULIN SER CALC-MCNC: 4.5 G/DL (ref 2–4)
GLUCOSE BLD STRIP.AUTO-MCNC: 147 MG/DL (ref 65–100)
GLUCOSE BLD STRIP.AUTO-MCNC: 170 MG/DL (ref 65–100)
GLUCOSE BLD STRIP.AUTO-MCNC: 190 MG/DL (ref 65–100)
GLUCOSE BLD STRIP.AUTO-MCNC: 198 MG/DL (ref 65–100)
GLUCOSE BLD STRIP.AUTO-MCNC: 497 MG/DL (ref 65–100)
GLUCOSE SERPL-MCNC: 215 MG/DL (ref 65–100)
HCT VFR BLD AUTO: 35 % (ref 36.6–50.3)
HGB BLD-MCNC: 11.6 G/DL (ref 12.1–17)
MCH RBC QN AUTO: 29 PG (ref 26–34)
MCHC RBC AUTO-ENTMCNC: 33.1 G/DL (ref 30–36.5)
MCV RBC AUTO: 87.5 FL (ref 80–99)
NRBC # BLD: 0.04 K/UL (ref 0–0.01)
NRBC BLD-RTO: 0.3 PER 100 WBC
PHOSPHATE SERPL-MCNC: 2.9 MG/DL (ref 2.6–4.7)
PLATELET # BLD AUTO: 270 K/UL (ref 150–400)
PMV BLD AUTO: 10.9 FL (ref 8.9–12.9)
POTASSIUM SERPL-SCNC: 3.2 MMOL/L (ref 3.5–5.1)
PROT SERPL-MCNC: 6.6 G/DL (ref 6.4–8.2)
RBC # BLD AUTO: 4 M/UL (ref 4.1–5.7)
SERVICE CMNT-IMP: ABNORMAL
SODIUM SERPL-SCNC: 135 MMOL/L (ref 136–145)
WBC # BLD AUTO: 13.8 K/UL (ref 4.1–11.1)

## 2018-11-13 PROCEDURE — 74011250636 HC RX REV CODE- 250/636: Performed by: INTERNAL MEDICINE

## 2018-11-13 PROCEDURE — 77010033678 HC OXYGEN DAILY

## 2018-11-13 PROCEDURE — 82962 GLUCOSE BLOOD TEST: CPT

## 2018-11-13 PROCEDURE — 74011250637 HC RX REV CODE- 250/637: Performed by: INTERNAL MEDICINE

## 2018-11-13 PROCEDURE — 74011636637 HC RX REV CODE- 636/637: Performed by: INTERNAL MEDICINE

## 2018-11-13 PROCEDURE — 74011250637 HC RX REV CODE- 250/637: Performed by: SURGERY

## 2018-11-13 PROCEDURE — 80053 COMPREHEN METABOLIC PANEL: CPT

## 2018-11-13 PROCEDURE — 85027 COMPLETE CBC AUTOMATED: CPT

## 2018-11-13 PROCEDURE — 74011250636 HC RX REV CODE- 250/636: Performed by: SURGERY

## 2018-11-13 PROCEDURE — 84100 ASSAY OF PHOSPHORUS: CPT

## 2018-11-13 PROCEDURE — 74011000258 HC RX REV CODE- 258: Performed by: SURGERY

## 2018-11-13 PROCEDURE — 77030020782 HC GWN BAIR PAWS FLX 3M -B

## 2018-11-13 PROCEDURE — 36415 COLL VENOUS BLD VENIPUNCTURE: CPT

## 2018-11-13 PROCEDURE — 94760 N-INVAS EAR/PLS OXIMETRY 1: CPT

## 2018-11-13 PROCEDURE — 65270000029 HC RM PRIVATE

## 2018-11-13 RX ORDER — METFORMIN HYDROCHLORIDE 500 MG/1
1000 TABLET, EXTENDED RELEASE ORAL
Status: DISCONTINUED | OUTPATIENT
Start: 2018-11-13 | End: 2018-11-16

## 2018-11-13 RX ORDER — GLIPIZIDE 5 MG/1
5 TABLET ORAL
Status: DISCONTINUED | OUTPATIENT
Start: 2018-11-13 | End: 2018-11-18 | Stop reason: HOSPADM

## 2018-11-13 RX ORDER — POTASSIUM CHLORIDE 750 MG/1
40 TABLET, FILM COATED, EXTENDED RELEASE ORAL EVERY 4 HOURS
Status: COMPLETED | OUTPATIENT
Start: 2018-11-13 | End: 2018-11-13

## 2018-11-13 RX ADMIN — OXYCODONE AND ACETAMINOPHEN 1 TABLET: 5; 325 TABLET ORAL at 00:04

## 2018-11-13 RX ADMIN — Medication 10 ML: at 06:00

## 2018-11-13 RX ADMIN — GLIPIZIDE 5 MG: 5 TABLET ORAL at 08:46

## 2018-11-13 RX ADMIN — SODIUM CHLORIDE, SODIUM LACTATE, POTASSIUM CHLORIDE, AND CALCIUM CHLORIDE 125 ML/HR: 600; 310; 30; 20 INJECTION, SOLUTION INTRAVENOUS at 01:45

## 2018-11-13 RX ADMIN — OXYCODONE AND ACETAMINOPHEN 1 TABLET: 5; 325 TABLET ORAL at 01:41

## 2018-11-13 RX ADMIN — POTASSIUM CHLORIDE 40 MEQ: 750 TABLET, FILM COATED, EXTENDED RELEASE ORAL at 08:43

## 2018-11-13 RX ADMIN — Medication 10 ML: at 18:12

## 2018-11-13 RX ADMIN — PIPERACILLIN SODIUM,TAZOBACTAM SODIUM 3.38 G: 3; .375 INJECTION, POWDER, FOR SOLUTION INTRAVENOUS at 10:59

## 2018-11-13 RX ADMIN — METFORMIN HYDROCHLORIDE 1000 MG: 500 TABLET, EXTENDED RELEASE ORAL at 18:05

## 2018-11-13 RX ADMIN — METFORMIN HYDROCHLORIDE 1000 MG: 500 TABLET, EXTENDED RELEASE ORAL at 08:46

## 2018-11-13 RX ADMIN — OXYCODONE HYDROCHLORIDE AND ACETAMINOPHEN 1 TABLET: 10; 325 TABLET ORAL at 10:57

## 2018-11-13 RX ADMIN — INSULIN LISPRO 2 UNITS: 100 INJECTION, SOLUTION INTRAVENOUS; SUBCUTANEOUS at 12:33

## 2018-11-13 RX ADMIN — METOPROLOL TARTRATE 25 MG: 25 TABLET ORAL at 20:44

## 2018-11-13 RX ADMIN — HYDROMORPHONE HYDROCHLORIDE 1 MG: 2 INJECTION, SOLUTION INTRAMUSCULAR; INTRAVENOUS; SUBCUTANEOUS at 03:58

## 2018-11-13 RX ADMIN — INSULIN LISPRO 10 UNITS: 100 INJECTION, SOLUTION INTRAVENOUS; SUBCUTANEOUS at 08:55

## 2018-11-13 RX ADMIN — POTASSIUM CHLORIDE 40 MEQ: 750 TABLET, FILM COATED, EXTENDED RELEASE ORAL at 12:33

## 2018-11-13 RX ADMIN — HYDROMORPHONE HYDROCHLORIDE 1 MG: 2 INJECTION, SOLUTION INTRAMUSCULAR; INTRAVENOUS; SUBCUTANEOUS at 23:47

## 2018-11-13 RX ADMIN — Medication 10 ML: at 22:00

## 2018-11-13 RX ADMIN — METOPROLOL TARTRATE 25 MG: 25 TABLET ORAL at 08:46

## 2018-11-13 RX ADMIN — AMLODIPINE BESYLATE 5 MG: 5 TABLET ORAL at 08:46

## 2018-11-13 RX ADMIN — PIPERACILLIN SODIUM,TAZOBACTAM SODIUM 3.38 G: 3; .375 INJECTION, POWDER, FOR SOLUTION INTRAVENOUS at 18:05

## 2018-11-13 RX ADMIN — HYDROMORPHONE HYDROCHLORIDE 1 MG: 2 INJECTION, SOLUTION INTRAMUSCULAR; INTRAVENOUS; SUBCUTANEOUS at 08:43

## 2018-11-13 RX ADMIN — OXYCODONE HYDROCHLORIDE AND ACETAMINOPHEN 1 TABLET: 10; 325 TABLET ORAL at 14:53

## 2018-11-13 RX ADMIN — Medication 5 ML: at 22:00

## 2018-11-13 RX ADMIN — PIPERACILLIN SODIUM,TAZOBACTAM SODIUM 3.38 G: 3; .375 INJECTION, POWDER, FOR SOLUTION INTRAVENOUS at 01:42

## 2018-11-13 RX ADMIN — OXYCODONE HYDROCHLORIDE AND ACETAMINOPHEN 1 TABLET: 10; 325 TABLET ORAL at 19:39

## 2018-11-13 NOTE — PROGRESS NOTES
1930- Bedside and Verbal shift change report given to MARIJA Mayfield (oncoming nurse) by Fransico Garcia (offgoing nurse). Report included the following information SBAR, Kardex, Intake/Output, MAR and Recent Results.

## 2018-11-13 NOTE — PROGRESS NOTES
Nutrition Assessment: 
 
RECOMMENDATIONS/INTERVENTION(S):  
Continue Full liquids- NCS. Advance to low fat/CCD 2200 kcal as tolerated Monitor tolerance, PO intakes, weight, Gi status Add Ensure HP to help meet estimated kcal/protein needs. ASSESSMENT:  
11/13: 48 yr old male admitted with abdominal pain. PMHx: DM, HTN, HLD, obesity. Pt screened for LOS. Pt POD#1 cholecystectomy. Pt likely to d/c tomorrow. Pt weight down 6 lbs about 6 months ago and weight stable since. BM+, pain improving, seems to be tolerating full liquids. K+ 3.2, Na 135. , 159, 157, 219 mg/dL. Phos 1.8. SUBJECTIVE/OBJECTIVE:  
Diet Order: Cardiac 
% Eaten:  No data found. Pertinent Medications: [x] Reviewed Labs reviewed:  [x] Anthropometrics: Height: 5' 8\" (172.7 cm) Weight: 110.2 kg (243 lb) IBW (%IBW):   ( ) UBW (%UBW):   (  %) BMI: Body mass index is 36.95 kg/m². This BMI is indicative of: 
 
 [] Underweight    [] Normal    [] Overweight    [x]  Obesity    []  Extreme Obesity (BMI>40) Estimated Nutrition Needs (Based on): 2498 Kcals/day(BMR(1921x1.3)) , 110 g(-132g/day(1.0-1.2g/kg)) Protein Carbohydrate: At Least 130 g/day  Fluids: 2500 mL/day (1mL/kg rounded to 50 mL) Last BM: 11/12   []Active     []Hyperactive  []Hypoactive       [] Absent   BS Skin:    [] Intact   [x] Incision- x5 lap sites  [] Breakdown   [] DTI   [] Tears/Excoriation/Abrasion  []Edema [] Other: Wt Readings from Last 30 Encounters:  
11/08/18 110.2 kg (243 lb) 11/08/18 110.2 kg (242 lb 15.2 oz)  
07/16/18 110.2 kg (242 lb 15.2 oz)  
07/10/18 110.2 kg (243 lb) 05/14/18 112.9 kg (249 lb) 04/10/18 113.9 kg (251 lb) 01/15/18 113.9 kg (251 lb) 04/04/17 112.1 kg (247 lb 3.2 oz) 02/20/17 115.2 kg (254 lb)  
07/29/16 112.5 kg (248 lb) 12/01/15 113.9 kg (251 lb)  
11/24/15 115.7 kg (255 lb)  
06/22/15 113.4 kg (250 lb)  
03/27/15 113.9 kg (251 lb)  
06/27/14 117.5 kg (259 lb) 12/16/13 117 kg (258 lb) 09/23/13 113.8 kg (250 lb 12.8 oz) 06/24/13 115.7 kg (255 lb) 04/12/13 114.3 kg (252 lb) NUTRITION DIAGNOSES:  
Problem:  Altered GI function Etiology: related to alteration in GI structure/function Signs/Symptoms: as evidenced by admiting dxsindy s/p day 1 NUTRITION INTERVENTIONS: 
Meals/Snacks: General/healthful diet   Supplements: Commercial supplement GOAL:  
Pt will tolerate low fat diet within 2-4 days Cultural, Samaritan, or Ethnic Dietary Needs: None LEARNING NEEDS (Diet, Food/Nutrient-Drug Interaction):  
 [x] None Identified 
 [] Identified and Education Provided/Documented 
 [] Identified and Pt declined/was not appropriate [x] Interdisciplinary Care Plan Reviewed/Documented  
 [x] Discharge Needs:    TBD [] No Nutrition Related Discharge Needs NUTRITION RISK:  
Pt Is At Nutrition Risk  [x] No Nutrition Risk Identified  [] PT SEEN FOR:  
 []  MD Consult: []Calorie Count []Diabetic Diet Education []Diet Education []Electrolyte Management []General Nutrition Management and Supplements []Management of Tube Feeding []TPN Recommendations []  RN Referral:  []MST score >=2 
   []Enteral/Parenteral Nutrition PTA []Pregnant: Gestational DM or Multigestation  
              [] Pressure Ulcer 
   
[]  Low BMI      [x]  Length of Stay       [] Dysphagia Diet     [] Ventilator      [] Follow-Up Previous Recommendations: 
 [] Implemented          [] Not Implemented          [x] Not Applicable Previous Goal: 
 [] Met              [] Progressing Towards Goal              [] Not Progressing Towards Goal   [x] Not Applicable Dacia Officer, MARIELA Pager: [phone] Office: [phone]

## 2018-11-13 NOTE — PROGRESS NOTES
Medical Progress Note NAME: Matilda Moss :  1965 MRM:  [id number] Date/Time: 2018  7:29 AM 
  
Assessment / Plan:  
 
Diabetes mellitus type 2, controlled:  Glucose well controlled. A1c 6.9 in July. Hyperglycemia this AM, but had decreased dose of lantus yesterday in preparation for surgery. -- resume metformin and glipizide and stop lantus --  Continue SSI Gallstone pancreatitis / Cholelithiasis:  S/P ERCP w/o stone found. Lap sindy on . Bump in LFT likely from ERCP/lap sindy. -- post op management per surgeon -- stop zosyn if okay with surgery / GI 
  
Hypokalemia: 
-- replete with po KCL 
  
Essential hypertension: 
-- continue amlodipine and metoprolol 
  
ARF (acute renal failure):  Resolved with IVF. 
  
Obesity, unspecified:  Advise weight loss. 
  
Other hyperlipidemia:   
-- hold statin until LFT improved 
  
Sepsis:  Resolved. Suspect DANILO: 
-- recommend outpatient sleep study -- check 6 mwt 
  
 
 
  
Subjective: Chief Complaint:  abd pain. Has had intermittent abd pain overnight. Pain up to 8. No n/v. 
 
ROS: 
(bold if positive, if negative) Abd Pain Objective:  
 
 
Vitals:  
 
 
  
Last 24hrs VS reviewed since prior progress note. Most recent are: 
 
Visit Vitals /86 (BP 1 Location: Right arm, BP Patient Position: Sitting) Pulse 95 Temp 99.6 °F (37.6 °C) Resp 17 Ht 5' 8\" (1.727 m) Wt 110.2 kg (243 lb) SpO2 90% BMI 36.95 kg/m² SpO2 Readings from Last 6 Encounters:  
18 90% O2 Flow Rate (L/min): 2 l/min Intake/Output Summary (Last 24 hours) at 2018 3023 Last data filed at 2018 5288 Gross per 24 hour Intake 2432.5 ml Output 855 ml Net 1577.5 ml Exam:  
 
Physical Exam: 
 
Gen:  Well-developed, well-nourished, in no acute distress HEENT:  Pink conjunctivae, hearing intact to voice, moist mucous membranes Resp:  No accessory muscle use, clear breath sounds without wheezes rales or rhonchi 
Card:  No murmurs, normal S1, S2 without thrills or peripheral edema Abd:  Soft, RUQ tender, non-distended, normoactive bowel sounds are present Neuro: Face symmetric, tongue midline, speech fluent,  strength is 5/5 bilaterally, follows commands appropriately Psych:  Good insight, alert Medications Reviewed: (see below) Lab Data Reviewed: (see below) 
 
______________________________________________________________________ Medications:  
 
Current Facility-Administered Medications Medication Dose Route Frequency  potassium chloride SR (KLOR-CON 10) tablet 40 mEq  40 mEq Oral Q4H  
 sodium chloride (NS) flush 5-10 mL  5-10 mL IntraVENous Q8H  
 sodium chloride (NS) flush 5-10 mL  5-10 mL IntraVENous PRN  
 oxyCODONE-acetaminophen (PERCOCET) 5-325 mg per tablet 1 Tab  1 Tab Oral Q4H PRN  
 oxyCODONE-acetaminophen (PERCOCET 10)  mg per tablet 1 Tab  1 Tab Oral Q4H PRN  
 ondansetron (ZOFRAN) injection 4 mg  4 mg IntraVENous Q6H PRN  piperacillin-tazobactam (ZOSYN) 3.375 g in 0.9% sodium chloride (MBP/ADV) 100 mL  3.375 g IntraVENous Q8H  
 benzocaine-menthol (CEPACOL) lozenge 1 Lozenge  1 Lozenge Mucous Membrane PRN  
 insulin glargine (LANTUS) injection 30 Units  30 Units SubCUTAneous DAILY  amLODIPine (NORVASC) tablet 5 mg  5 mg Oral DAILY  metoprolol tartrate (LOPRESSOR) tablet 25 mg  25 mg Oral Q12H  
 sodium chloride (NS) flush 5-10 mL  5-10 mL IntraVENous Q8H  
 sodium chloride (NS) flush 5-10 mL  5-10 mL IntraVENous PRN  
 ondansetron (ZOFRAN) injection 4 mg  4 mg IntraVENous Q4H PRN  
 insulin lispro (HUMALOG) injection   SubCUTAneous Q6H  
 glucose chewable tablet 16 g  4 Tab Oral PRN  
 dextrose (D50W) injection syrg 12.5-25 g  12.5-25 g IntraVENous PRN  
 glucagon (GLUCAGEN) injection 1 mg  1 mg IntraMUSCular PRN  
  influenza vaccine 2018-19 (6 mos+)(PF) (FLUARIX QUAD/FLULAVAL QUAD) injection 0.5 mL  0.5 mL IntraMUSCular PRIOR TO DISCHARGE  
 HYDROmorphone (PF) (DILAUDID) injection 1 mg  1 mg IntraVENous Q4H PRN Lab Review:  
 
Recent Labs 11/13/18 
8537 11/12/18 
4689 11/11/18 
7643 WBC 13.8* 16.4* 17.2* HGB 11.6* 12.2 12.0*  
HCT 35.0* 36.2* 35.5*  267 230 Recent Labs 11/13/18 
2574 11/12/18 
6600 11/11/18 
2730 * 138 140  
K 3.2* 3.5 3.6  101 103 CO2 30 30 33* * 159* 157* BUN 13 15 19 CREA 1.29 1.12 1.20 CA 7.6* 7.9* 7.3*  
MG  --  2.3 2.1 PHOS 2.9 1.8* 1.4* ALB 2.1* 2.2* 2.4* TBILI 1.4* 1.9* 3.0*  
SGOT 167* 32 56* * 145* 238* Lab Results Component Value Date/Time Glucose (POC) 497 (H) 11/13/2018 06:40 AM  
 Glucose (POC) 190 (H) 11/13/2018 01:57 AM  
 Glucose (POC) 112 (H) 11/12/2018 05:16 PM  
 Glucose (POC) 146 (H) 11/12/2018 12:13 PM  
 Glucose (POC) 129 (H) 11/12/2018 06:58 AM  
 
 
 
Total time spent with patient: 25 Minutes Care Plan discussed with: Patient and Family Discussed:  Care Plan Disposition:  Home w/Family 
        
___________________________________________________ Attending Physician: Wilma Butler MD

## 2018-11-13 NOTE — PERIOP NOTES
TRANSFER - OUT REPORT: 
 
Verbal report given to Miryam Bautista RN on Margarita Mccloud  being transferred to  for routine post - op Report consisted of patients Situation, Background, Assessment and  
Recommendations(SBAR). Information from the following report(s) SBAR and MAR was reviewed with the receiving nurse. Opportunity for questions and clarification was provided. Patient transported with: 
 Registered Nurse

## 2018-11-13 NOTE — PROGRESS NOTES
General Surgery Daily Progress Note Patient: Swetha Anton MRN: [id number]  SSN: [id number] YOB: 1965  Age: 48 y.o. Sex: male Admit Date: 11/8/2018 Subjective:  
Feeling ok, denies N/V, + flatus and BM. Pain controlled. Current Facility-Administered Medications Medication Dose Route Frequency  potassium chloride SR (KLOR-CON 10) tablet 40 mEq  40 mEq Oral Q4H  
 glipiZIDE (GLUCOTROL) tablet 5 mg  5 mg Oral ACB  metFORMIN ER (GLUCOPHAGE XR) tablet 1,000 mg  1,000 mg Oral ACB&D  
 sodium chloride (NS) flush 5-10 mL  5-10 mL IntraVENous Q8H  
 sodium chloride (NS) flush 5-10 mL  5-10 mL IntraVENous PRN  
 oxyCODONE-acetaminophen (PERCOCET) 5-325 mg per tablet 1 Tab  1 Tab Oral Q4H PRN  
 oxyCODONE-acetaminophen (PERCOCET 10)  mg per tablet 1 Tab  1 Tab Oral Q4H PRN  
 ondansetron (ZOFRAN) injection 4 mg  4 mg IntraVENous Q6H PRN  piperacillin-tazobactam (ZOSYN) 3.375 g in 0.9% sodium chloride (MBP/ADV) 100 mL  3.375 g IntraVENous Q8H  
 benzocaine-menthol (CEPACOL) lozenge 1 Lozenge  1 Lozenge Mucous Membrane PRN  
 amLODIPine (NORVASC) tablet 5 mg  5 mg Oral DAILY  metoprolol tartrate (LOPRESSOR) tablet 25 mg  25 mg Oral Q12H  
 sodium chloride (NS) flush 5-10 mL  5-10 mL IntraVENous Q8H  
 sodium chloride (NS) flush 5-10 mL  5-10 mL IntraVENous PRN  
 insulin lispro (HUMALOG) injection   SubCUTAneous Q6H  
 glucose chewable tablet 16 g  4 Tab Oral PRN  
 dextrose (D50W) injection syrg 12.5-25 g  12.5-25 g IntraVENous PRN  
 glucagon (GLUCAGEN) injection 1 mg  1 mg IntraMUSCular PRN  
 influenza vaccine 2018-19 (6 mos+)(PF) (FLUARIX QUAD/FLULAVAL QUAD) injection 0.5 mL  0.5 mL IntraMUSCular PRIOR TO DISCHARGE  
 HYDROmorphone (PF) (DILAUDID) injection 1 mg  1 mg IntraVENous Q4H PRN Objective: No intake/output data recorded. 11/11 1901 - 11/13 0700 In: 2632.5 [I.V.:2632.5] Out: 855 [Urine:825] Patient Vitals for the past 8 hrs: BP Temp Pulse Resp SpO2  
11/13/18 0813 (!) 163/93 97.7 °F (36.5 °C) 99 18 97 % 11/13/18 0641 144/86 99.6 °F (37.6 °C) 95 17 90 % 11/13/18 0424 129/77 98.9 °F (37.2 °C) 93 18 93 % Physical Exam: 
General: Alert, cooperative, NAD Lungs: Unlabored Heart:  Regular rate and  rhythm Abdomen: Soft, ATTP, mildly distended. + bowel sounds. Incisions c/d/i. Extremities: Warm, moves all, no edema Skin:  Warm and dry, no rash Labs:  
Recent Labs 11/13/18 0414 WBC 13.8* HGB 11.6* HCT 35.0*  
 Recent Labs 11/13/18 0414 11/12/18 
6424 * 138  
K 3.2* 3.5  101 CO2 30 30 * 159* BUN 13 15 CREA 1.29 1.12  
CA 7.6* 7.9*  
MG  --  2.3 PHOS 2.9 1.8* ALB 2.1* 2.2* TBILI 1.4* 1.9*  
SGOT 167* 32 * 145* Assessment / Plan: · Gallstone pancreatitis · Choledocholithiasis · POD#1 lap sindy · Advance to full liquids · PO pain meds · Replete K+ · Mobilize · Will need a total of 7 days ABX post-op. May transition to Augmentin at discharge

## 2018-11-13 NOTE — PROGRESS NOTES
Oximetry Six Minute walk attempted at 10:00 AM today and again at 1:15 PM.  Patient refused both times due to pain/gas. He want's to wait until tomorrow AM.  I will attempt to work with patient again tomorrow.

## 2018-11-13 NOTE — ANESTHESIA POSTPROCEDURE EVALUATION
Procedure(s): CHOLECYSTECTOMY LAPAROSCOPIC. Anesthesia Post Evaluation Patient location during evaluation: PACU Level of consciousness: awake Pain management: adequate Airway patency: patent Anesthetic complications: no 
Cardiovascular status: acceptable Respiratory status: acceptable Hydration status: acceptable Visit Vitals BP (!) 187/107 Pulse 89 Temp 36.7 °C (98 °F) Resp 20 Ht 5' 8\" (1.727 m) Wt 110.2 kg (243 lb) SpO2 95% BMI 36.95 kg/m²

## 2018-11-13 NOTE — ANESTHESIA POSTPROCEDURE EVALUATION
Procedure(s): CHOLECYSTECTOMY LAPAROSCOPIC. Anesthesia Post Evaluation Patient location during evaluation: bedside Patient participation: complete - patient participated Level of consciousness: awake Pain management: adequate Airway patency: patent Anesthetic complications: no 
Cardiovascular status: acceptable Respiratory status: acceptable Hydration status: acceptable Visit Vitals BP (!) 187/107 Pulse 89 Temp 36.7 °C (98 °F) Resp 20 Ht 5' 8\" (1.727 m) Wt 110.2 kg (243 lb) SpO2 95% BMI 36.95 kg/m²

## 2018-11-13 NOTE — PROGRESS NOTES
Gastrointestinal Progress Note 11/13/2018 Admit Date: 11/8/2018 Subjective:  
 
New Complaints Today: Complaints of increased pain after surgery yesterday. Feeling better this AM. Pain around incision sites. Denies nausea or vomiting. Bleeding:  None Current Facility-Administered Medications Medication Dose Route Frequency  potassium chloride SR (KLOR-CON 10) tablet 40 mEq  40 mEq Oral Q4H  
 glipiZIDE (GLUCOTROL) tablet 5 mg  5 mg Oral ACB  metFORMIN ER (GLUCOPHAGE XR) tablet 1,000 mg  1,000 mg Oral ACB&D  
 sodium chloride (NS) flush 5-10 mL  5-10 mL IntraVENous Q8H  
 sodium chloride (NS) flush 5-10 mL  5-10 mL IntraVENous PRN  
 oxyCODONE-acetaminophen (PERCOCET) 5-325 mg per tablet 1 Tab  1 Tab Oral Q4H PRN  
 oxyCODONE-acetaminophen (PERCOCET 10)  mg per tablet 1 Tab  1 Tab Oral Q4H PRN  
 ondansetron (ZOFRAN) injection 4 mg  4 mg IntraVENous Q6H PRN  piperacillin-tazobactam (ZOSYN) 3.375 g in 0.9% sodium chloride (MBP/ADV) 100 mL  3.375 g IntraVENous Q8H  
 benzocaine-menthol (CEPACOL) lozenge 1 Lozenge  1 Lozenge Mucous Membrane PRN  
 amLODIPine (NORVASC) tablet 5 mg  5 mg Oral DAILY  metoprolol tartrate (LOPRESSOR) tablet 25 mg  25 mg Oral Q12H  
 sodium chloride (NS) flush 5-10 mL  5-10 mL IntraVENous Q8H  
 sodium chloride (NS) flush 5-10 mL  5-10 mL IntraVENous PRN  
 ondansetron (ZOFRAN) injection 4 mg  4 mg IntraVENous Q4H PRN  
 insulin lispro (HUMALOG) injection   SubCUTAneous Q6H  
 glucose chewable tablet 16 g  4 Tab Oral PRN  
 dextrose (D50W) injection syrg 12.5-25 g  12.5-25 g IntraVENous PRN  
 glucagon (GLUCAGEN) injection 1 mg  1 mg IntraMUSCular PRN  
 influenza vaccine 2018-19 (6 mos+)(PF) (FLUARIX QUAD/FLULAVAL QUAD) injection 0.5 mL  0.5 mL IntraMUSCular PRIOR TO DISCHARGE  
 HYDROmorphone (PF) (DILAUDID) injection 1 mg  1 mg IntraVENous Q4H PRN Objective: Blood pressure (!) 163/93, pulse 99, temperature 97.7 °F (36.5 °C), resp. rate 18, height 5' 8\" (1.727 m), weight 110.2 kg (243 lb), SpO2 97 %. No intake/output data recorded. 11/11 1901 - 11/13 0700 In: 2632.5 [I.V.:2632.5] Out: 855 [Urine:825] EXAM:  GENERAL: alert, cooperative, no distress ABDOMEN:  Bowel sounds present, soft, nondistended, tender around incision sites Data Review Recent Results (from the past 24 hour(s)) GLUCOSE, POC Collection Time: 11/12/18 12:13 PM  
Result Value Ref Range Glucose (POC) 146 (H) 65 - 100 mg/dL Performed by Shantelle (PCT) CULTURE, BODY FLUID W GRAM STAIN Collection Time: 11/12/18  3:00 PM  
Result Value Ref Range Special Requests: gall bladder fluid GRAM STAIN RARE WBCS SEEN    
 GRAM STAIN NO ORGANISMS SEEN Culture result: PENDING   
GLUCOSE, POC Collection Time: 11/12/18  5:16 PM  
Result Value Ref Range Glucose (POC) 112 (H) 65 - 100 mg/dL Performed by Sunrise GLUCOSE, POC Collection Time: 11/13/18  1:57 AM  
Result Value Ref Range Glucose (POC) 190 (H) 65 - 100 mg/dL Performed by Chele Zeng CBC W/O DIFF Collection Time: 11/13/18  4:14 AM  
Result Value Ref Range WBC 13.8 (H) 4.1 - 11.1 K/uL  
 RBC 4.00 (L) 4.10 - 5.70 M/uL  
 HGB 11.6 (L) 12.1 - 17.0 g/dL HCT 35.0 (L) 36.6 - 50.3 % MCV 87.5 80.0 - 99.0 FL  
 MCH 29.0 26.0 - 34.0 PG  
 MCHC 33.1 30.0 - 36.5 g/dL  
 RDW 16.7 (H) 11.5 - 14.5 % PLATELET 694 811 - 830 K/uL MPV 10.9 8.9 - 12.9 FL  
 NRBC 0.3 (H) 0  WBC ABSOLUTE NRBC 0.04 (H) 0.00 - 0.01 K/uL METABOLIC PANEL, COMPREHENSIVE Collection Time: 11/13/18  4:14 AM  
Result Value Ref Range Sodium 135 (L) 136 - 145 mmol/L Potassium 3.2 (L) 3.5 - 5.1 mmol/L Chloride 101 97 - 108 mmol/L  
 CO2 30 21 - 32 mmol/L Anion gap 4 (L) 5 - 15 mmol/L Glucose 215 (H) 65 - 100 mg/dL BUN 13 6 - 20 MG/DL  Creatinine 1.29 0.70 - 1.30 MG/DL  
 BUN/Creatinine ratio 10 (L) 12 - 20 GFR est AA >60 >60 ml/min/1.73m2 GFR est non-AA 58 (L) >60 ml/min/1.73m2 Calcium 7.6 (L) 8.5 - 10.1 MG/DL Bilirubin, total 1.4 (H) 0.2 - 1.0 MG/DL  
 ALT (SGPT) 180 (H) 12 - 78 U/L  
 AST (SGOT) 167 (H) 15 - 37 U/L Alk. phosphatase 195 (H) 45 - 117 U/L Protein, total 6.6 6.4 - 8.2 g/dL Albumin 2.1 (L) 3.5 - 5.0 g/dL Globulin 4.5 (H) 2.0 - 4.0 g/dL A-G Ratio 0.5 (L) 1.1 - 2.2 PHOSPHORUS Collection Time: 11/13/18  4:14 AM  
Result Value Ref Range Phosphorus 2.9 2.6 - 4.7 MG/DL  
GLUCOSE, POC Collection Time: 11/13/18  6:40 AM  
Result Value Ref Range Glucose (POC) 497 (H) 65 - 100 mg/dL Performed by Clovis Godinez (PCT) Assessment:  
 
Principal Problem: 
  Gallstone pancreatitis (11/8/2018) Active Problems: 
  Diabetes mellitus type 2, controlled ([facility] [address] (2/16/2013) Obesity, unspecified (2/16/2013) Other hyperlipidemia (2/16/2013) Essential hypertension (2/16/2013) Pancreatitis (11/8/2018) Hypokalemia (11/8/2018) ARF (acute renal failure) ([address] (11/8/2018) Abnormal LFTs (11/8/2018) Cholelithiasis (11/8/2018) Hyperbilirubinemia (11/9/2018) Slight bump in AST/ALT but Tbili continued to trend down - this is likely reactive from cholecystectomy Plan: 1. Post op management/diet recommendations per surgery 2. No need for abx from GI standpoint 3. Will arrange for patient to have outpatient screening colonsocopy 4. Will see again as needed Tisha Curry PA-C 
11/13/18 
9:50 AM 
 
I have interviewed and examined patient with addendum to note above and formulation care plan to reflect my evaluation Aure Kerr M.D.

## 2018-11-13 NOTE — PROGRESS NOTES
Bedside shift change report given to Janet (oncoming nurse) by Chucho Guillen (offgoing nurse). Report included the following information SBAR, Kardex, Intake/Output, MAR, Accordion and Recent Results.

## 2018-11-14 ENCOUNTER — APPOINTMENT (OUTPATIENT)
Dept: GENERAL RADIOLOGY | Age: 53
DRG: 417 | End: 2018-11-14
Attending: INTERNAL MEDICINE
Payer: SELF-PAY

## 2018-11-14 LAB
ALBUMIN SERPL-MCNC: 2.1 G/DL (ref 3.5–5)
ALBUMIN/GLOB SERPL: 0.4 {RATIO} (ref 1.1–2.2)
ALP SERPL-CCNC: 190 U/L (ref 45–117)
ALT SERPL-CCNC: 106 U/L (ref 12–78)
ANION GAP SERPL CALC-SCNC: 10 MMOL/L (ref 5–15)
AST SERPL-CCNC: 42 U/L (ref 15–37)
BILIRUB SERPL-MCNC: 1.3 MG/DL (ref 0.2–1)
BUN SERPL-MCNC: 13 MG/DL (ref 6–20)
BUN/CREAT SERPL: 12 (ref 12–20)
CALCIUM SERPL-MCNC: 8.3 MG/DL (ref 8.5–10.1)
CHLORIDE SERPL-SCNC: 99 MMOL/L (ref 97–108)
CO2 SERPL-SCNC: 27 MMOL/L (ref 21–32)
CREAT SERPL-MCNC: 1.05 MG/DL (ref 0.7–1.3)
ERYTHROCYTE [DISTWIDTH] IN BLOOD BY AUTOMATED COUNT: 16.7 % (ref 11.5–14.5)
GLOBULIN SER CALC-MCNC: 5 G/DL (ref 2–4)
GLUCOSE BLD STRIP.AUTO-MCNC: 146 MG/DL (ref 65–100)
GLUCOSE BLD STRIP.AUTO-MCNC: 164 MG/DL (ref 65–100)
GLUCOSE BLD STRIP.AUTO-MCNC: 195 MG/DL (ref 65–100)
GLUCOSE BLD STRIP.AUTO-MCNC: 213 MG/DL (ref 65–100)
GLUCOSE SERPL-MCNC: 210 MG/DL (ref 65–100)
HCT VFR BLD AUTO: 35.9 % (ref 36.6–50.3)
HGB BLD-MCNC: 11.8 G/DL (ref 12.1–17)
MCH RBC QN AUTO: 28.6 PG (ref 26–34)
MCHC RBC AUTO-ENTMCNC: 32.9 G/DL (ref 30–36.5)
MCV RBC AUTO: 87.1 FL (ref 80–99)
NRBC # BLD: 0.02 K/UL (ref 0–0.01)
NRBC BLD-RTO: 0.1 PER 100 WBC
PLATELET # BLD AUTO: 314 K/UL (ref 150–400)
PMV BLD AUTO: 11.4 FL (ref 8.9–12.9)
POTASSIUM SERPL-SCNC: 3.3 MMOL/L (ref 3.5–5.1)
PROT SERPL-MCNC: 7.1 G/DL (ref 6.4–8.2)
RBC # BLD AUTO: 4.12 M/UL (ref 4.1–5.7)
SERVICE CMNT-IMP: ABNORMAL
SODIUM SERPL-SCNC: 136 MMOL/L (ref 136–145)
WBC # BLD AUTO: 16.4 K/UL (ref 4.1–11.1)

## 2018-11-14 PROCEDURE — 74011250637 HC RX REV CODE- 250/637: Performed by: INTERNAL MEDICINE

## 2018-11-14 PROCEDURE — 65270000029 HC RM PRIVATE

## 2018-11-14 PROCEDURE — 85027 COMPLETE CBC AUTOMATED: CPT

## 2018-11-14 PROCEDURE — 74011000258 HC RX REV CODE- 258: Performed by: SURGERY

## 2018-11-14 PROCEDURE — 36415 COLL VENOUS BLD VENIPUNCTURE: CPT

## 2018-11-14 PROCEDURE — 80053 COMPREHEN METABOLIC PANEL: CPT

## 2018-11-14 PROCEDURE — 94761 N-INVAS EAR/PLS OXIMETRY MLT: CPT

## 2018-11-14 PROCEDURE — 82962 GLUCOSE BLOOD TEST: CPT

## 2018-11-14 PROCEDURE — 71046 X-RAY EXAM CHEST 2 VIEWS: CPT

## 2018-11-14 PROCEDURE — 74011250636 HC RX REV CODE- 250/636: Performed by: INTERNAL MEDICINE

## 2018-11-14 PROCEDURE — 74011250636 HC RX REV CODE- 250/636: Performed by: SURGERY

## 2018-11-14 PROCEDURE — 74011636637 HC RX REV CODE- 636/637: Performed by: INTERNAL MEDICINE

## 2018-11-14 RX ORDER — HYDROCODONE BITARTRATE AND ACETAMINOPHEN 7.5; 325 MG/1; MG/1
1 TABLET ORAL
Status: DISCONTINUED | OUTPATIENT
Start: 2018-11-14 | End: 2018-11-18 | Stop reason: HOSPADM

## 2018-11-14 RX ORDER — POTASSIUM CHLORIDE 750 MG/1
40 TABLET, FILM COATED, EXTENDED RELEASE ORAL EVERY 4 HOURS
Status: COMPLETED | OUTPATIENT
Start: 2018-11-14 | End: 2018-11-14

## 2018-11-14 RX ADMIN — PIPERACILLIN SODIUM,TAZOBACTAM SODIUM 3.38 G: 3; .375 INJECTION, POWDER, FOR SOLUTION INTRAVENOUS at 01:58

## 2018-11-14 RX ADMIN — Medication 10 ML: at 06:22

## 2018-11-14 RX ADMIN — HYDROCODONE BITARTRATE AND ACETAMINOPHEN 1 TABLET: 7.5; 325 TABLET ORAL at 10:23

## 2018-11-14 RX ADMIN — INSULIN LISPRO 3 UNITS: 100 INJECTION, SOLUTION INTRAVENOUS; SUBCUTANEOUS at 18:11

## 2018-11-14 RX ADMIN — METOPROLOL TARTRATE 25 MG: 25 TABLET ORAL at 09:25

## 2018-11-14 RX ADMIN — HYDROMORPHONE HYDROCHLORIDE 1 MG: 2 INJECTION, SOLUTION INTRAMUSCULAR; INTRAVENOUS; SUBCUTANEOUS at 06:41

## 2018-11-14 RX ADMIN — INSULIN LISPRO 2 UNITS: 100 INJECTION, SOLUTION INTRAVENOUS; SUBCUTANEOUS at 06:30

## 2018-11-14 RX ADMIN — HYDROCODONE BITARTRATE AND ACETAMINOPHEN 1 TABLET: 7.5; 325 TABLET ORAL at 20:34

## 2018-11-14 RX ADMIN — Medication 10 ML: at 22:00

## 2018-11-14 RX ADMIN — GLIPIZIDE 5 MG: 5 TABLET ORAL at 06:21

## 2018-11-14 RX ADMIN — POTASSIUM CHLORIDE 40 MEQ: 750 TABLET, FILM COATED, EXTENDED RELEASE ORAL at 13:23

## 2018-11-14 RX ADMIN — AMLODIPINE BESYLATE 5 MG: 5 TABLET ORAL at 09:24

## 2018-11-14 RX ADMIN — PIPERACILLIN SODIUM,TAZOBACTAM SODIUM 3.38 G: 3; .375 INJECTION, POWDER, FOR SOLUTION INTRAVENOUS at 18:08

## 2018-11-14 RX ADMIN — METFORMIN HYDROCHLORIDE 1000 MG: 500 TABLET, EXTENDED RELEASE ORAL at 06:21

## 2018-11-14 RX ADMIN — POTASSIUM CHLORIDE 40 MEQ: 750 TABLET, FILM COATED, EXTENDED RELEASE ORAL at 16:47

## 2018-11-14 RX ADMIN — METOPROLOL TARTRATE 25 MG: 25 TABLET ORAL at 20:34

## 2018-11-14 RX ADMIN — HYDROCODONE BITARTRATE AND ACETAMINOPHEN 1 TABLET: 7.5; 325 TABLET ORAL at 15:52

## 2018-11-14 RX ADMIN — INSULIN LISPRO 2 UNITS: 100 INJECTION, SOLUTION INTRAVENOUS; SUBCUTANEOUS at 12:26

## 2018-11-14 RX ADMIN — PIPERACILLIN SODIUM,TAZOBACTAM SODIUM 3.38 G: 3; .375 INJECTION, POWDER, FOR SOLUTION INTRAVENOUS at 09:25

## 2018-11-14 RX ADMIN — METFORMIN HYDROCHLORIDE 1000 MG: 500 TABLET, EXTENDED RELEASE ORAL at 16:47

## 2018-11-14 NOTE — PROGRESS NOTES
Medical Progress Note NAME: Livia Norris :  1965 MRM:  [id number] Date/Time: 2018  7:29 AM 
  
Assessment / Plan:  
 
Diabetes mellitus type 2, controlled:  Glucose well controlled. A1c 6.9 in July. -- continue metformin and glipizide -- continue SSI Gallstone pancreatitis / Cholelithiasis:  S/P ERCP w/o stone found. Lap sindy on . Bump in LFT likely from ERCP/lap sindy. -- post op management per surgeon 
-- to continue abx as per surgery 
  
Essential hypertension: 
-- continue amlodipine and metoprolol Fever:  Had isolated fever overnight. May be post op fever / ATX. No recurrence. -- check CXR 
-- continue IS  
 
ARF (acute renal failure):  Resolved with IVF. 
  
Obesity, unspecified:  Advise weight loss. 
  
Other hyperlipidemia:   
-- hold statin until LFT improved 
  
Sepsis:  Resolved. Suspect DANILO:  Has trace peripheral edema and bibasilar rales. May have some edema due to IVF. -- recommend outpatient sleep study -- check 6 mwt 
-- check CXR 
  
 
 
  
Subjective: Chief Complaint:  Feels much better. Less abd pain. Believes percocet is too strong. No n/v. Tolerating diet. ROS: 
(bold if positive, if negative) Objective:  
 
 
Vitals:  
 
 
  
Last 24hrs VS reviewed since prior progress note. Most recent are: 
 
Visit Vitals /89 (BP 1 Location: Left arm) Pulse 96 Temp 98.2 °F (36.8 °C) Resp 16 Ht 5' 8\" (1.727 m) Wt 110.2 kg (243 lb) SpO2 93% BMI 36.95 kg/m² SpO2 Readings from Last 6 Encounters:  
18 93% O2 Flow Rate (L/min): 2 l/min Intake/Output Summary (Last 24 hours) at 2018 1495 Last data filed at 2018 1805 Gross per 24 hour Intake 840 ml Output 900 ml Net -60 ml Exam:  
 
Physical Exam: 
 
Gen:  Well-developed, well-nourished, in no acute distress HEENT:  Pink conjunctivae, hearing intact to voice, moist mucous membranes Resp:  No accessory muscle use, scant bibasilar rales Card:  No murmurs, normal S1, S2 without thrills, trace peripheral edema Abd:  Soft, non-tender, non-distended, normoactive bowel sounds are present Neuro: Face symmetric, tongue midline, speech fluent,  strength is 5/5 bilaterally, follows commands appropriately Psych:  Good insight, alert Medications Reviewed: (see below) Lab Data Reviewed: (see below) 
 
______________________________________________________________________ Medications:  
 
Current Facility-Administered Medications Medication Dose Route Frequency  HYDROcodone-acetaminophen (NORCO) 7.5-325 mg per tablet 1 Tab  1 Tab Oral Q4H PRN  
 glipiZIDE (GLUCOTROL) tablet 5 mg  5 mg Oral ACB  metFORMIN ER (GLUCOPHAGE XR) tablet 1,000 mg  1,000 mg Oral ACB&D  
 sodium chloride (NS) flush 5-10 mL  5-10 mL IntraVENous Q8H  
 sodium chloride (NS) flush 5-10 mL  5-10 mL IntraVENous PRN  
 ondansetron (ZOFRAN) injection 4 mg  4 mg IntraVENous Q6H PRN  piperacillin-tazobactam (ZOSYN) 3.375 g in 0.9% sodium chloride (MBP/ADV) 100 mL  3.375 g IntraVENous Q8H  
 benzocaine-menthol (CEPACOL) lozenge 1 Lozenge  1 Lozenge Mucous Membrane PRN  
 amLODIPine (NORVASC) tablet 5 mg  5 mg Oral DAILY  metoprolol tartrate (LOPRESSOR) tablet 25 mg  25 mg Oral Q12H  
 sodium chloride (NS) flush 5-10 mL  5-10 mL IntraVENous Q8H  
 sodium chloride (NS) flush 5-10 mL  5-10 mL IntraVENous PRN  
 insulin lispro (HUMALOG) injection   SubCUTAneous Q6H  
 glucose chewable tablet 16 g  4 Tab Oral PRN  
 dextrose (D50W) injection syrg 12.5-25 g  12.5-25 g IntraVENous PRN  
 glucagon (GLUCAGEN) injection 1 mg  1 mg IntraMUSCular PRN  
 influenza vaccine 2018-19 (6 mos+)(PF) (FLUARIX QUAD/FLULAVAL QUAD) injection 0.5 mL  0.5 mL IntraMUSCular PRIOR TO DISCHARGE Lab Review:  
 
Recent Labs 11/13/18 
0414 11/12/18 
7476 WBC 13.8* 16.4* HGB 11.6* 12.2 HCT 35.0* 36.2*  
  267 Recent Labs 11/13/18 
0414 11/12/18 
9568 * 138  
K 3.2* 3.5  101 CO2 30 30 * 159* BUN 13 15 CREA 1.29 1.12  
CA 7.6* 7.9*  
MG  --  2.3 PHOS 2.9 1.8* ALB 2.1* 2.2* TBILI 1.4* 1.9*  
SGOT 167* 32 * 145* Lab Results Component Value Date/Time Glucose (POC) 164 (H) 11/14/2018 06:20 AM  
 Glucose (POC) 146 (H) 11/14/2018 12:50 AM  
 Glucose (POC) 198 (H) 11/13/2018 08:58 PM  
 Glucose (POC) 170 (H) 11/13/2018 04:57 PM  
 Glucose (POC) 147 (H) 11/13/2018 12:12 PM  
 
 
 
Total time spent with patient: 25 Minutes Care Plan discussed with: Patient and Family Discussed:  Care Plan Disposition:  Home w/Family 
        
___________________________________________________ Attending Physician: Lali Marion MD

## 2018-11-14 NOTE — PROGRESS NOTES
General Surgery Daily Progress Note Patient: Lelia Rosas MRN: [id number]  SSN: [id number] YOB: 1965  Age: 48 y.o. Sex: male Admit Date: 11/8/2018 Subjective:  
States Percocet was too strong, was replaced with Norco. Denies N/V, + flatus and BM. Current Facility-Administered Medications Medication Dose Route Frequency  HYDROcodone-acetaminophen (NORCO) 7.5-325 mg per tablet 1 Tab  1 Tab Oral Q4H PRN  
 glipiZIDE (GLUCOTROL) tablet 5 mg  5 mg Oral ACB  metFORMIN ER (GLUCOPHAGE XR) tablet 1,000 mg  1,000 mg Oral ACB&D  
 sodium chloride (NS) flush 5-10 mL  5-10 mL IntraVENous Q8H  
 sodium chloride (NS) flush 5-10 mL  5-10 mL IntraVENous PRN  
 ondansetron (ZOFRAN) injection 4 mg  4 mg IntraVENous Q6H PRN  piperacillin-tazobactam (ZOSYN) 3.375 g in 0.9% sodium chloride (MBP/ADV) 100 mL  3.375 g IntraVENous Q8H  
 benzocaine-menthol (CEPACOL) lozenge 1 Lozenge  1 Lozenge Mucous Membrane PRN  
 amLODIPine (NORVASC) tablet 5 mg  5 mg Oral DAILY  metoprolol tartrate (LOPRESSOR) tablet 25 mg  25 mg Oral Q12H  
 sodium chloride (NS) flush 5-10 mL  5-10 mL IntraVENous Q8H  
 sodium chloride (NS) flush 5-10 mL  5-10 mL IntraVENous PRN  
 insulin lispro (HUMALOG) injection   SubCUTAneous Q6H  
 glucose chewable tablet 16 g  4 Tab Oral PRN  
 dextrose (D50W) injection syrg 12.5-25 g  12.5-25 g IntraVENous PRN  
 glucagon (GLUCAGEN) injection 1 mg  1 mg IntraMUSCular PRN  
 influenza vaccine 2018-19 (6 mos+)(PF) (FLUARIX QUAD/FLULAVAL QUAD) injection 0.5 mL  0.5 mL IntraMUSCular PRIOR TO DISCHARGE Objective: No intake/output data recorded. 11/12 1901 - 11/14 0700 In: 840 [P.O.:840] Out: 1250 [WBQEF:0852] Patient Vitals for the past 8 hrs: 
 BP Temp Pulse Resp SpO2  
11/14/18 0826 154/90 97.3 °F (36.3 °C) 91 18 94 % 11/14/18 0622 143/89 98.2 °F (36.8 °C) 96 16 93 % Physical Exam: 
General: Alert, cooperative, NAD Lungs: Unlabored Heart:  Regular rate and  rhythm Abdomen: Soft, ATTP, mildly distended. + bowel sounds. Incisions c/d/i. Extremities: Warm, moves all, no edema Skin:  Warm and dry, no rash Labs:  
Recent Labs 11/13/18 
0414 WBC 13.8* HGB 11.6* HCT 35.0*  
 Recent Labs 11/13/18 0414 11/12/18 
9099 * 138  
K 3.2* 3.5  101 CO2 30 30 * 159* BUN 13 15 CREA 1.29 1.12  
CA 7.6* 7.9*  
MG  --  2.3 PHOS 2.9 1.8* ALB 2.1* 2.2* TBILI 1.4* 1.9*  
SGOT 167* 32 * 145* Assessment / Plan: · Gallstone pancreatitis · Choledocholithiasis · Acute cholecystitis with cholelithiasis · POD#2 laparoscopic cholecystectomy · Fever overnight, CXR with atelectasis. Respiratory therapy c/s placed for pulmonary toilet · Advance to diabetic diet · Percocet changed to [address] [place] [address],[address] for pain · CBC, BMP pending this AM 
· Mobilize · Will need a total of 7 days ABX post-op. May transition to Augmentin at discharge Mera Hernandez MD

## 2018-11-14 NOTE — PROGRESS NOTES
CM Note: 
Met with pt and gave him a list of [facility] sleep d/o centers with addresses and phone numbers.   MARIJA Reinoso

## 2018-11-14 NOTE — DIABETES MGMT
DTC Progress Note Recommendations/ Comments: Noted home meds resumed yesterday morning. Fasting BG WNL, but then begins to trend upwards throughout the day. If appropriate, please consider increasing Glipizide to 10mg once daily before breakfast.  
 
Current hospital DM medication:  
-Humalog normal sensitivity correction 
-Glipizide 5mg once daily before breakfast  
-Metformin ER 1,000mg bid Chart reviewed on Migel Goldberg. Patient is a 48 y.o. male with known history of Type 2 Diabetes on Metformin ER 1,000mg bid with meals and Glipizide 5mg once daily with dinner at home. A1c:  
Lab Results Component Value Date/Time Hemoglobin A1c 6.9 (H) 07/10/2018 01:45 PM  
 Hemoglobin A1c 8.0 (H) 04/10/2018 01:42 PM  
 
 
Recent Glucose Results:  
Lab Results Component Value Date/Time  (H) 11/14/2018 11:36 AM  
 GLUCPOC 195 (H) 11/14/2018 11:31 AM  
 GLUCPOC 164 (H) 11/14/2018 06:20 AM  
 GLUCPOC 146 (H) 11/14/2018 12:50 AM  
  
 
Lab Results Component Value Date/Time Creatinine 1.05 11/14/2018 11:36 AM  
 
Estimated Creatinine Clearance: 97.9 mL/min (based on SCr of 1.05 mg/dL). Active Orders Diet DIET DIABETIC CONSISTENT CARB Regular PO intake:  
Patient Vitals for the past 72 hrs: 
 % Diet Eaten 11/14/18 1510 10 % 11/14/18 0930 25 % 11/13/18 1805 50 % 11/13/18 1411 25 % 11/13/18 1045 25 % Will continue to follow as needed. Thank you Ade Carmona RD, CDE [facility] Office: [phone] Pager: [phone]

## 2018-11-14 NOTE — PROGRESS NOTES
Shift Summary 0700 Bedside and Verbal shift change report given to BERYL Sauceda RN (oncoming nurse) by BERYL Day RN (offgoing nurse). Report included the following information SBAR, Kardex, MAR and Recent Results. Patient up and moving about and walking to the bathroom. Increased mobility encouraged. [address] Bedside and Verbal shift change report given to Momo Martin (oncoming nurse) by Philomena Villanueva. Deneen Sauceda  (offgoing nurse). Report included the following information SBAR, Kardex, MAR and Recent Results.

## 2018-11-14 NOTE — PROGRESS NOTES
Oximetry 6 Minute Walk Test was performed in hallway. Patient tolerated well with no shortness of breath. His resting room air O2 saturation was 94% with a heart rate of 92 bpm.  While walking on room air his lowest O2 saturation was 89%. His heart rate increased to 110 bpm.  With rest he returned to his baseline within 2 minutes. No supplemental O2 was indicated.

## 2018-11-14 NOTE — PROGRESS NOTES
Bedside and Verbal shift change report given to MARIJA Campbell  by Megha Bland RN. Report included the following information SBAR, Kardex and MAR.

## 2018-11-15 LAB
ALBUMIN SERPL-MCNC: 2 G/DL (ref 3.5–5)
ALBUMIN/GLOB SERPL: 0.5 {RATIO} (ref 1.1–2.2)
ALP SERPL-CCNC: 174 U/L (ref 45–117)
ALT SERPL-CCNC: 79 U/L (ref 12–78)
ANION GAP SERPL CALC-SCNC: 9 MMOL/L (ref 5–15)
AST SERPL-CCNC: 24 U/L (ref 15–37)
BASOPHILS # BLD: 0 K/UL (ref 0–0.1)
BASOPHILS NFR BLD: 0 % (ref 0–1)
BILIRUB SERPL-MCNC: 1.1 MG/DL (ref 0.2–1)
BUN SERPL-MCNC: 12 MG/DL (ref 6–20)
BUN/CREAT SERPL: 12 (ref 12–20)
CALCIUM SERPL-MCNC: 8.1 MG/DL (ref 8.5–10.1)
CHLORIDE SERPL-SCNC: 103 MMOL/L (ref 97–108)
CO2 SERPL-SCNC: 28 MMOL/L (ref 21–32)
CREAT SERPL-MCNC: 1 MG/DL (ref 0.7–1.3)
DIFFERENTIAL METHOD BLD: ABNORMAL
EOSINOPHIL # BLD: 0.7 K/UL (ref 0–0.4)
EOSINOPHIL NFR BLD: 5 % (ref 0–7)
ERYTHROCYTE [DISTWIDTH] IN BLOOD BY AUTOMATED COUNT: 16.7 % (ref 11.5–14.5)
GLOBULIN SER CALC-MCNC: 3.7 G/DL (ref 2–4)
GLUCOSE BLD STRIP.AUTO-MCNC: 185 MG/DL (ref 65–100)
GLUCOSE BLD STRIP.AUTO-MCNC: 209 MG/DL (ref 65–100)
GLUCOSE BLD STRIP.AUTO-MCNC: 229 MG/DL (ref 65–100)
GLUCOSE BLD STRIP.AUTO-MCNC: 229 MG/DL (ref 65–100)
GLUCOSE BLD STRIP.AUTO-MCNC: 231 MG/DL (ref 65–100)
GLUCOSE SERPL-MCNC: 185 MG/DL (ref 65–100)
HCT VFR BLD AUTO: 33.8 % (ref 36.6–50.3)
HGB BLD-MCNC: 11 G/DL (ref 12.1–17)
IMM GRANULOCYTES # BLD: 0 K/UL
IMM GRANULOCYTES NFR BLD AUTO: 0 %
LYMPHOCYTES # BLD: 1.9 K/UL (ref 0.8–3.5)
LYMPHOCYTES NFR BLD: 14 % (ref 12–49)
MCH RBC QN AUTO: 28.5 PG (ref 26–34)
MCHC RBC AUTO-ENTMCNC: 32.5 G/DL (ref 30–36.5)
MCV RBC AUTO: 87.6 FL (ref 80–99)
METAMYELOCYTES NFR BLD MANUAL: 1 %
MONOCYTES # BLD: 1.8 K/UL (ref 0–1)
MONOCYTES NFR BLD: 13 % (ref 5–13)
MYELOCYTES NFR BLD MANUAL: 1 %
NEUTS SEG # BLD: 9 K/UL (ref 1.8–8)
NEUTS SEG NFR BLD: 66 % (ref 32–75)
NRBC # BLD: 0.02 K/UL (ref 0–0.01)
NRBC BLD-RTO: 0.1 PER 100 WBC
PLATELET # BLD AUTO: 319 K/UL (ref 150–400)
PMV BLD AUTO: 11.4 FL (ref 8.9–12.9)
POTASSIUM SERPL-SCNC: 4.1 MMOL/L (ref 3.5–5.1)
PROT SERPL-MCNC: 5.7 G/DL (ref 6.4–8.2)
RBC # BLD AUTO: 3.86 M/UL (ref 4.1–5.7)
RBC MORPH BLD: ABNORMAL
SERVICE CMNT-IMP: ABNORMAL
SODIUM SERPL-SCNC: 140 MMOL/L (ref 136–145)
WBC # BLD AUTO: 13.7 K/UL (ref 4.1–11.1)

## 2018-11-15 PROCEDURE — 90686 IIV4 VACC NO PRSV 0.5 ML IM: CPT | Performed by: INTERNAL MEDICINE

## 2018-11-15 PROCEDURE — 90471 IMMUNIZATION ADMIN: CPT

## 2018-11-15 PROCEDURE — 74011250637 HC RX REV CODE- 250/637: Performed by: INTERNAL MEDICINE

## 2018-11-15 PROCEDURE — 74011250636 HC RX REV CODE- 250/636: Performed by: INTERNAL MEDICINE

## 2018-11-15 PROCEDURE — 74011250636 HC RX REV CODE- 250/636: Performed by: SURGERY

## 2018-11-15 PROCEDURE — 36415 COLL VENOUS BLD VENIPUNCTURE: CPT

## 2018-11-15 PROCEDURE — 80053 COMPREHEN METABOLIC PANEL: CPT

## 2018-11-15 PROCEDURE — 82962 GLUCOSE BLOOD TEST: CPT

## 2018-11-15 PROCEDURE — 85025 COMPLETE CBC W/AUTO DIFF WBC: CPT

## 2018-11-15 PROCEDURE — 74011636637 HC RX REV CODE- 636/637: Performed by: INTERNAL MEDICINE

## 2018-11-15 PROCEDURE — 65270000029 HC RM PRIVATE

## 2018-11-15 PROCEDURE — 74011000258 HC RX REV CODE- 258: Performed by: SURGERY

## 2018-11-15 RX ORDER — AMOXICILLIN AND CLAVULANATE POTASSIUM 875; 125 MG/1; MG/1
1 TABLET, FILM COATED ORAL 2 TIMES DAILY WITH MEALS
Status: DISCONTINUED | OUTPATIENT
Start: 2018-11-15 | End: 2018-11-18 | Stop reason: HOSPADM

## 2018-11-15 RX ORDER — FUROSEMIDE 10 MG/ML
20 INJECTION INTRAMUSCULAR; INTRAVENOUS ONCE
Status: COMPLETED | OUTPATIENT
Start: 2018-11-15 | End: 2018-11-15

## 2018-11-15 RX ORDER — LANOLIN ALCOHOL/MO/W.PET/CERES
3 CREAM (GRAM) TOPICAL
Status: DISCONTINUED | OUTPATIENT
Start: 2018-11-15 | End: 2018-11-18 | Stop reason: HOSPADM

## 2018-11-15 RX ADMIN — FUROSEMIDE 20 MG: 10 INJECTION, SOLUTION INTRAMUSCULAR; INTRAVENOUS at 08:36

## 2018-11-15 RX ADMIN — GLIPIZIDE 5 MG: 5 TABLET ORAL at 06:32

## 2018-11-15 RX ADMIN — INFLUENZA VIRUS VACCINE 0.5 ML: 15; 15; 15; 15 SUSPENSION INTRAMUSCULAR at 12:09

## 2018-11-15 RX ADMIN — Medication 10 ML: at 20:53

## 2018-11-15 RX ADMIN — AMOXICILLIN AND CLAVULANATE POTASSIUM 1 TABLET: 875; 125 TABLET, FILM COATED ORAL at 18:01

## 2018-11-15 RX ADMIN — METOPROLOL TARTRATE 25 MG: 25 TABLET ORAL at 08:36

## 2018-11-15 RX ADMIN — AMLODIPINE BESYLATE 5 MG: 5 TABLET ORAL at 08:36

## 2018-11-15 RX ADMIN — AMOXICILLIN AND CLAVULANATE POTASSIUM 1 TABLET: 875; 125 TABLET, FILM COATED ORAL at 08:36

## 2018-11-15 RX ADMIN — HYDROCODONE BITARTRATE AND ACETAMINOPHEN 1 TABLET: 7.5; 325 TABLET ORAL at 00:33

## 2018-11-15 RX ADMIN — Medication 10 ML: at 06:33

## 2018-11-15 RX ADMIN — FUROSEMIDE 20 MG: 10 INJECTION, SOLUTION INTRAMUSCULAR; INTRAVENOUS at 18:01

## 2018-11-15 RX ADMIN — INSULIN LISPRO 3 UNITS: 100 INJECTION, SOLUTION INTRAVENOUS; SUBCUTANEOUS at 12:09

## 2018-11-15 RX ADMIN — METFORMIN HYDROCHLORIDE 1000 MG: 500 TABLET, EXTENDED RELEASE ORAL at 18:01

## 2018-11-15 RX ADMIN — INSULIN LISPRO 2 UNITS: 100 INJECTION, SOLUTION INTRAVENOUS; SUBCUTANEOUS at 06:00

## 2018-11-15 RX ADMIN — INSULIN LISPRO 2 UNITS: 100 INJECTION, SOLUTION INTRAVENOUS; SUBCUTANEOUS at 00:33

## 2018-11-15 RX ADMIN — PIPERACILLIN SODIUM,TAZOBACTAM SODIUM 3.38 G: 3; .375 INJECTION, POWDER, FOR SOLUTION INTRAVENOUS at 02:11

## 2018-11-15 RX ADMIN — MELATONIN TAB 3 MG 3 MG: 3 TAB at 23:22

## 2018-11-15 RX ADMIN — INSULIN LISPRO 3 UNITS: 100 INJECTION, SOLUTION INTRAVENOUS; SUBCUTANEOUS at 18:01

## 2018-11-15 RX ADMIN — METFORMIN HYDROCHLORIDE 1000 MG: 500 TABLET, EXTENDED RELEASE ORAL at 06:32

## 2018-11-15 RX ADMIN — METOPROLOL TARTRATE 25 MG: 25 TABLET ORAL at 20:52

## 2018-11-15 NOTE — PROGRESS NOTES
Medical Progress Note NAME: Vielka Rodriges :  1965 MRM:  [id number] Date/Time: 11/15/2018  7:58 AM 
  
Assessment / Plan:  
 
Diabetes mellitus type 2, controlled:  Glucose well controlled. A1c 6.9 in July. -- continue metformin and glipizide -- continue SSI Gallstone pancreatitis / Cholelithiasis:  S/P ERCP w/o stone found. Lap sindy on . Bump in LFT likely from ERCP/lap sindy. -- change to augmentin 
  
Essential hypertension: 
-- continue amlodipine and metoprolol Fever:  Had isolated fever overnight. May be post op fever / ATX. No recurrence. CXR likely atelectasis and poor inspiratory changes. No fever since . 
-- continue IS  
 
ARF (acute renal failure):  Resolved with IVF. 
  
Obesity, unspecified:  Advise weight loss. 
  
Other hyperlipidemia:   
-- hold statin until LFT improved 
  
Sepsis:  Resolved. Suspect DANILO:  Has trace peripheral edema and bibasilar rales. May have some edema due to IVF. No sig hypoxia on 6 mwt. -- outpatient sleep study recommended and info given 
  
Edema:  Likely from IVF given due to SG. -- lasix x 1 this AM 
 
Potential DC this afternoon. Subjective: Chief Complaint:  Feels well other than edema Reports edema of legs not much better. ROS: 
(bold if positive, if negative) Objective:  
 
 
Vitals:  
 
 
  
Last 24hrs VS reviewed since prior progress note. Most recent are: 
 
Visit Vitals /87 (BP 1 Location: Left arm, BP Patient Position: At rest) Pulse 88 Temp 98.4 °F (36.9 °C) Resp 16 Ht 5' 8\" (1.727 m) Wt 110.2 kg (243 lb) SpO2 97% BMI 36.95 kg/m² SpO2 Readings from Last 6 Encounters:  
11/15/18 97% O2 Flow Rate (L/min): 2 l/min Intake/Output Summary (Last 24 hours) at 11/15/2018 0751 Last data filed at 11/15/2018 0211 Gross per 24 hour Intake 820 ml Output 380 ml Net 440 ml Exam:  
 
Physical Exam: Gen:  Well-developed, well-nourished, in no acute distress HEENT:  Pink conjunctivae, hearing intact to voice, moist mucous membranes Resp:  No accessory muscle use, scant bibasilar rales Card:  No murmurs, normal S1, S2 without thrills, trace peripheral edema Abd:  Soft, non-tender, non-distended, normoactive bowel sounds are present Neuro: Face symmetric, tongue midline, speech fluent,  strength is 5/5 bilaterally, follows commands appropriately Psych:  Good insight, alert Medications Reviewed: (see below) Lab Data Reviewed: (see below) 
 
______________________________________________________________________ Medications:  
 
Current Facility-Administered Medications Medication Dose Route Frequency  furosemide (LASIX) injection 20 mg  20 mg IntraVENous ONCE  
 HYDROcodone-acetaminophen (NORCO) 7.5-325 mg per tablet 1 Tab  1 Tab Oral Q4H PRN  
 glipiZIDE (GLUCOTROL) tablet 5 mg  5 mg Oral ACB  metFORMIN ER (GLUCOPHAGE XR) tablet 1,000 mg  1,000 mg Oral ACB&D  
 sodium chloride (NS) flush 5-10 mL  5-10 mL IntraVENous Q8H  
 sodium chloride (NS) flush 5-10 mL  5-10 mL IntraVENous PRN  
 ondansetron (ZOFRAN) injection 4 mg  4 mg IntraVENous Q6H PRN  piperacillin-tazobactam (ZOSYN) 3.375 g in 0.9% sodium chloride (MBP/ADV) 100 mL  3.375 g IntraVENous Q8H  
 benzocaine-menthol (CEPACOL) lozenge 1 Lozenge  1 Lozenge Mucous Membrane PRN  
 amLODIPine (NORVASC) tablet 5 mg  5 mg Oral DAILY  metoprolol tartrate (LOPRESSOR) tablet 25 mg  25 mg Oral Q12H  
 sodium chloride (NS) flush 5-10 mL  5-10 mL IntraVENous Q8H  
 sodium chloride (NS) flush 5-10 mL  5-10 mL IntraVENous PRN  
 insulin lispro (HUMALOG) injection   SubCUTAneous Q6H  
 glucose chewable tablet 16 g  4 Tab Oral PRN  
 dextrose (D50W) injection syrg 12.5-25 g  12.5-25 g IntraVENous PRN  
 glucagon (GLUCAGEN) injection 1 mg  1 mg IntraMUSCular PRN  
  influenza vaccine 2018-19 (6 mos+)(PF) (FLUARIX QUAD/FLULAVAL QUAD) injection 0.5 mL  0.5 mL IntraMUSCular PRIOR TO DISCHARGE Lab Review:  
 
Recent Labs 11/15/18 
[id number] 11/14/18 1136 11/13/18 
0414 WBC 13.7* 16.4* 13.8* HGB 11.0* 11.8* 11.6* HCT 33.8* 35.9* 35.0*  
 314 270 Recent Labs 11/15/18 
[id number] 11/14/18 1136 11/13/18 
0414  136 135* K 4.1 3.3* 3.2*  
 99 101 CO2 28 27 30 * 210* 215* BUN 12 13 13 CREA 1.00 1.05 1.29  
CA 8.1* 8.3* 7.6* PHOS  --   --  2.9 ALB 2.0* 2.1* 2.1* TBILI 1.1* 1.3* 1.4* SGOT 24 42* 167* ALT 79* 106* 180* Lab Results Component Value Date/Time Glucose (POC) 209 (H) 11/15/2018 07:33 AM  
 Glucose (POC) 185 (H) 11/15/2018 06:04 AM  
 Glucose (POC) 229 (H) 11/15/2018 12:14 AM  
 Glucose (POC) 213 (H) 11/14/2018 05:51 PM  
 Glucose (POC) 195 (H) 11/14/2018 11:31 AM  
 
 
              
Care Plan discussed with: Patient and Family Discussed:  Care Plan Disposition:  Home w/Family 
        
___________________________________________________ Attending Physician: Lali Marion MD

## 2018-11-15 NOTE — DIABETES MGMT
DTC Progress Note Recommendations/ Comments: If appropriate, please consider increasing Glipizide to 10mg once daily before breakfast.  
 
Current hospital DM medication:  
-Humalog normal sensitivity correction 
-Glipizide 5mg once daily before breakfast  
-Metformin ER 1,000mg bid Chart reviewed on Jayme Rivera. Patient is a 48 y.o. male with known history of Type 2 Diabetes on Metformin ER 1,000mg bid with meals and Glipizide 5mg once daily with dinner at home. A1c:  
Lab Results Component Value Date/Time Hemoglobin A1c 6.9 (H) 07/10/2018 01:45 PM  
 Hemoglobin A1c 8.0 (H) 04/10/2018 01:42 PM  
 
 
Recent Glucose Results:  
Lab Results Component Value Date/Time  (H) 11/15/2018 05:57 AM  
 GLUCPOC 229 (H) 11/15/2018 11:49 AM  
 GLUCPOC 209 (H) 11/15/2018 07:33 AM  
 GLUCPOC 185 (H) 11/15/2018 06:04 AM  
  
 
Lab Results Component Value Date/Time Creatinine 1.00 11/15/2018 05:57 AM  
 
Estimated Creatinine Clearance: 102.8 mL/min (based on SCr of 1 mg/dL). Active Orders Diet DIET DIABETIC CONSISTENT CARB Regular PO intake:  
Patient Vitals for the past 72 hrs: 
 % Diet Eaten 11/14/18 1510 10 % 11/14/18 0930 25 % 11/13/18 1805 50 % 11/13/18 1411 25 % 11/13/18 1045 25 % Will continue to follow as needed. Thank you Aliza Petersen RD, CDE [facility] Office: [phone] Pager: [phone]

## 2018-11-15 NOTE — PROGRESS NOTES
General Surgery Daily Progress Note Patient: Sanam Golden MRN: [id number]  SSN: [id number] YOB: 1965  Age: 48 y.o. Sex: male Admit Date: 11/8/2018 Subjective:  
Feeling well, pain controlled, tolerating diet Current Facility-Administered Medications Medication Dose Route Frequency  amoxicillin-clavulanate (AUGMENTIN) 875-125 mg per tablet 1 Tab  1 Tab Oral BID WITH MEALS  
 HYDROcodone-acetaminophen (NORCO) 7.5-325 mg per tablet 1 Tab  1 Tab Oral Q4H PRN  
 glipiZIDE (GLUCOTROL) tablet 5 mg  5 mg Oral ACB  metFORMIN ER (GLUCOPHAGE XR) tablet 1,000 mg  1,000 mg Oral ACB&D  
 sodium chloride (NS) flush 5-10 mL  5-10 mL IntraVENous Q8H  
 sodium chloride (NS) flush 5-10 mL  5-10 mL IntraVENous PRN  
 ondansetron (ZOFRAN) injection 4 mg  4 mg IntraVENous Q6H PRN  
 benzocaine-menthol (CEPACOL) lozenge 1 Lozenge  1 Lozenge Mucous Membrane PRN  
 amLODIPine (NORVASC) tablet 5 mg  5 mg Oral DAILY  metoprolol tartrate (LOPRESSOR) tablet 25 mg  25 mg Oral Q12H  
 sodium chloride (NS) flush 5-10 mL  5-10 mL IntraVENous Q8H  
 sodium chloride (NS) flush 5-10 mL  5-10 mL IntraVENous PRN  
 insulin lispro (HUMALOG) injection   SubCUTAneous Q6H  
 glucose chewable tablet 16 g  4 Tab Oral PRN  
 dextrose (D50W) injection syrg 12.5-25 g  12.5-25 g IntraVENous PRN  
 glucagon (GLUCAGEN) injection 1 mg  1 mg IntraMUSCular PRN  
 influenza vaccine 2018-19 (6 mos+)(PF) (FLUARIX QUAD/FLULAVAL QUAD) injection 0.5 mL  0.5 mL IntraMUSCular PRIOR TO DISCHARGE Objective: No intake/output data recorded. 11/13 1901 - 11/15 0700 In: 36 [P.O.:220; I.V.:600] Out: 380 [Urine:380] Patient Vitals for the past 8 hrs: 
 BP Temp Pulse Resp SpO2  
11/15/18 0731 135/87 98.4 °F (36.9 °C) 88 16 97 % 11/15/18 0607 142/83 98 °F (36.7 °C) 84 16 97 % Physical Exam: 
General: Alert, cooperative, NAD Lungs: Unlabored Heart:  Regular rate and  rhythm Abdomen: Soft, ATTP, mildly distended. + bowel sounds. Incisions c/d/i. Extremities: Warm, moves all, no edema Skin:  Warm and dry, no rash Labs:  
Recent Labs 11/15/18 
0557 WBC 13.7* HGB 11.0*  
HCT 33.8*  
 Recent Labs 11/15/18 
[id number]  11/13/18 
0414    < > 135* K 4.1   < > 3.2*  
   < > 101 CO2 28   < > 30 *   < > 215* BUN 12   < > 13 CREA 1.00   < > 1.29  
CA 8.1*   < > 7.6* PHOS  --   --  2.9 ALB 2.0*   < > 2.1* TBILI 1.1*   < > 1.4* SGOT 24   < > 167* ALT 79*   < > 180*  
 < > = values in this interval not displayed. Assessment / Plan: · Gallstone pancreatitis · Choledocholithiasis · POD#3 lap sindy · Diet as tolerated · PO pain meds · OK for discharge from our standpoint on PO ABX. F/u in 2 weeks.

## 2018-11-15 NOTE — PROGRESS NOTES
Bedside and Verbal shift change report given to MARIJA Reid   by Sury Michelle RN. Report included the following information SBAR, Christa and MAR.

## 2018-11-15 NOTE — ROUTINE PROCESS
Bedside shift change report given to Lavonne Schilder (oncoming nurse) by Fei Fernandez (offgoing nurse). Report included the following information SBAR.

## 2018-11-16 ENCOUNTER — APPOINTMENT (OUTPATIENT)
Dept: CT IMAGING | Age: 53
DRG: 417 | End: 2018-11-16
Attending: INTERNAL MEDICINE
Payer: SELF-PAY

## 2018-11-16 ENCOUNTER — APPOINTMENT (OUTPATIENT)
Dept: NUCLEAR MEDICINE | Age: 53
DRG: 417 | End: 2018-11-16
Attending: PHYSICIAN ASSISTANT
Payer: SELF-PAY

## 2018-11-16 LAB
ALBUMIN SERPL-MCNC: 2.1 G/DL (ref 3.5–5)
ALBUMIN/GLOB SERPL: 0.4 {RATIO} (ref 1.1–2.2)
ALP SERPL-CCNC: 169 U/L (ref 45–117)
ALT SERPL-CCNC: 64 U/L (ref 12–78)
ANION GAP SERPL CALC-SCNC: 10 MMOL/L (ref 5–15)
AST SERPL-CCNC: 45 U/L (ref 15–37)
BACTERIA SPEC CULT: NORMAL
BACTERIA SPEC CULT: NORMAL
BILIRUB DIRECT SERPL-MCNC: 0.4 MG/DL (ref 0–0.2)
BILIRUB SERPL-MCNC: 0.9 MG/DL (ref 0.2–1)
BUN SERPL-MCNC: 15 MG/DL (ref 6–20)
BUN/CREAT SERPL: 14 (ref 12–20)
CALCIUM SERPL-MCNC: 8.7 MG/DL (ref 8.5–10.1)
CHLORIDE SERPL-SCNC: 101 MMOL/L (ref 97–108)
CO2 SERPL-SCNC: 28 MMOL/L (ref 21–32)
CREAT SERPL-MCNC: 1.08 MG/DL (ref 0.7–1.3)
ERYTHROCYTE [DISTWIDTH] IN BLOOD BY AUTOMATED COUNT: 16.3 % (ref 11.5–14.5)
GLOBULIN SER CALC-MCNC: 4.8 G/DL (ref 2–4)
GLUCOSE BLD STRIP.AUTO-MCNC: 189 MG/DL (ref 65–100)
GLUCOSE BLD STRIP.AUTO-MCNC: 203 MG/DL (ref 65–100)
GLUCOSE BLD STRIP.AUTO-MCNC: 205 MG/DL (ref 65–100)
GLUCOSE BLD STRIP.AUTO-MCNC: 205 MG/DL (ref 65–100)
GLUCOSE BLD STRIP.AUTO-MCNC: 228 MG/DL (ref 65–100)
GLUCOSE BLD STRIP.AUTO-MCNC: 251 MG/DL (ref 65–100)
GLUCOSE SERPL-MCNC: 224 MG/DL (ref 65–100)
GRAM STN SPEC: NORMAL
GRAM STN SPEC: NORMAL
HCT VFR BLD AUTO: 33.4 % (ref 36.6–50.3)
HGB BLD-MCNC: 11.1 G/DL (ref 12.1–17)
LIPASE SERPL-CCNC: 277 U/L (ref 73–393)
MCH RBC QN AUTO: 29.3 PG (ref 26–34)
MCHC RBC AUTO-ENTMCNC: 33.2 G/DL (ref 30–36.5)
MCV RBC AUTO: 88.1 FL (ref 80–99)
NRBC # BLD: 0.02 K/UL (ref 0–0.01)
NRBC BLD-RTO: 0.1 PER 100 WBC
PLATELET # BLD AUTO: 376 K/UL (ref 150–400)
PMV BLD AUTO: 11.2 FL (ref 8.9–12.9)
POTASSIUM SERPL-SCNC: 3.6 MMOL/L (ref 3.5–5.1)
PROT SERPL-MCNC: 6.9 G/DL (ref 6.4–8.2)
RBC # BLD AUTO: 3.79 M/UL (ref 4.1–5.7)
SERVICE CMNT-IMP: ABNORMAL
SERVICE CMNT-IMP: NORMAL
SERVICE CMNT-IMP: NORMAL
SODIUM SERPL-SCNC: 139 MMOL/L (ref 136–145)
WBC # BLD AUTO: 15.1 K/UL (ref 4.1–11.1)

## 2018-11-16 PROCEDURE — 80076 HEPATIC FUNCTION PANEL: CPT

## 2018-11-16 PROCEDURE — 74011636637 HC RX REV CODE- 636/637: Performed by: INTERNAL MEDICINE

## 2018-11-16 PROCEDURE — 74177 CT ABD & PELVIS W/CONTRAST: CPT

## 2018-11-16 PROCEDURE — 80048 BASIC METABOLIC PNL TOTAL CA: CPT

## 2018-11-16 PROCEDURE — 93970 EXTREMITY STUDY: CPT

## 2018-11-16 PROCEDURE — 83690 ASSAY OF LIPASE: CPT

## 2018-11-16 PROCEDURE — 82962 GLUCOSE BLOOD TEST: CPT

## 2018-11-16 PROCEDURE — 36415 COLL VENOUS BLD VENIPUNCTURE: CPT

## 2018-11-16 PROCEDURE — 74011636320 HC RX REV CODE- 636/320: Performed by: RADIOLOGY

## 2018-11-16 PROCEDURE — 74011250637 HC RX REV CODE- 250/637: Performed by: INTERNAL MEDICINE

## 2018-11-16 PROCEDURE — 94760 N-INVAS EAR/PLS OXIMETRY 1: CPT

## 2018-11-16 PROCEDURE — 74011250636 HC RX REV CODE- 250/636: Performed by: INTERNAL MEDICINE

## 2018-11-16 PROCEDURE — 85027 COMPLETE CBC AUTOMATED: CPT

## 2018-11-16 PROCEDURE — 65270000029 HC RM PRIVATE

## 2018-11-16 PROCEDURE — A9537 TC99M MEBROFENIN: HCPCS

## 2018-11-16 PROCEDURE — 71275 CT ANGIOGRAPHY CHEST: CPT

## 2018-11-16 RX ORDER — POTASSIUM CHLORIDE 750 MG/1
40 TABLET, FILM COATED, EXTENDED RELEASE ORAL
Status: DISCONTINUED | OUTPATIENT
Start: 2018-11-16 | End: 2018-11-16

## 2018-11-16 RX ORDER — FUROSEMIDE 10 MG/ML
40 INJECTION INTRAMUSCULAR; INTRAVENOUS ONCE
Status: COMPLETED | OUTPATIENT
Start: 2018-11-16 | End: 2018-11-16

## 2018-11-16 RX ORDER — POTASSIUM CHLORIDE 750 MG/1
40 TABLET, FILM COATED, EXTENDED RELEASE ORAL EVERY 4 HOURS
Status: COMPLETED | OUTPATIENT
Start: 2018-11-16 | End: 2018-11-16

## 2018-11-16 RX ORDER — DOCUSATE SODIUM 100 MG/1
100 CAPSULE, LIQUID FILLED ORAL 2 TIMES DAILY
Status: DISCONTINUED | OUTPATIENT
Start: 2018-11-16 | End: 2018-11-18 | Stop reason: HOSPADM

## 2018-11-16 RX ORDER — ENOXAPARIN SODIUM 100 MG/ML
40 INJECTION SUBCUTANEOUS EVERY 24 HOURS
Status: DISCONTINUED | OUTPATIENT
Start: 2018-11-16 | End: 2018-11-18 | Stop reason: HOSPADM

## 2018-11-16 RX ADMIN — INSULIN LISPRO 2 UNITS: 100 INJECTION, SOLUTION INTRAVENOUS; SUBCUTANEOUS at 06:26

## 2018-11-16 RX ADMIN — INSULIN LISPRO 3 UNITS: 100 INJECTION, SOLUTION INTRAVENOUS; SUBCUTANEOUS at 18:21

## 2018-11-16 RX ADMIN — AMOXICILLIN AND CLAVULANATE POTASSIUM 1 TABLET: 875; 125 TABLET, FILM COATED ORAL at 18:21

## 2018-11-16 RX ADMIN — Medication 10 ML: at 22:07

## 2018-11-16 RX ADMIN — AMLODIPINE BESYLATE 5 MG: 5 TABLET ORAL at 08:58

## 2018-11-16 RX ADMIN — IOPAMIDOL 80 ML: 755 INJECTION, SOLUTION INTRAVENOUS at 08:14

## 2018-11-16 RX ADMIN — ENOXAPARIN SODIUM 40 MG: 40 INJECTION SUBCUTANEOUS at 08:59

## 2018-11-16 RX ADMIN — POTASSIUM CHLORIDE 40 MEQ: 750 TABLET, FILM COATED, EXTENDED RELEASE ORAL at 12:04

## 2018-11-16 RX ADMIN — IOPAMIDOL 70 ML: 755 INJECTION, SOLUTION INTRAVENOUS at 13:56

## 2018-11-16 RX ADMIN — INSULIN LISPRO 3 UNITS: 100 INJECTION, SOLUTION INTRAVENOUS; SUBCUTANEOUS at 12:04

## 2018-11-16 RX ADMIN — METOPROLOL TARTRATE 25 MG: 25 TABLET ORAL at 08:58

## 2018-11-16 RX ADMIN — GLIPIZIDE 5 MG: 5 TABLET ORAL at 08:59

## 2018-11-16 RX ADMIN — FUROSEMIDE 40 MG: 10 INJECTION, SOLUTION INTRAMUSCULAR; INTRAVENOUS at 08:59

## 2018-11-16 RX ADMIN — Medication 10 ML: at 06:27

## 2018-11-16 RX ADMIN — POTASSIUM CHLORIDE 40 MEQ: 750 TABLET, FILM COATED, EXTENDED RELEASE ORAL at 08:59

## 2018-11-16 RX ADMIN — AMOXICILLIN AND CLAVULANATE POTASSIUM 1 TABLET: 875; 125 TABLET, FILM COATED ORAL at 08:59

## 2018-11-16 RX ADMIN — Medication 10 ML: at 22:08

## 2018-11-16 RX ADMIN — INSULIN LISPRO 3 UNITS: 100 INJECTION, SOLUTION INTRAVENOUS; SUBCUTANEOUS at 02:01

## 2018-11-16 RX ADMIN — METOPROLOL TARTRATE 25 MG: 25 TABLET ORAL at 22:06

## 2018-11-16 NOTE — ROUTINE PROCESS
Bedside and Verbal shift change report given to Jeanette (oncoming nurse) by Karen Torrez (offgoing nurse). Report included the following information SBAR, Kardex, MAR, Accordion and Recent Results.

## 2018-11-16 NOTE — ROUTINE PROCESS
Bedside shift change report given to drea (oncoming nurse) by Hoda Bustillos (offgoing nurse). Report included the following information SBAR.

## 2018-11-16 NOTE — DISCHARGE SUMMARY
Physician Interim Summary   Patient ID:  Stefani Ahumada  [id number]  48 y.o.  1965    PCP: Akbar Hurtado MD     Consults: GI and General Surgery    Covering dates: 11/12/2018 through 11/16/2018    Admission Diagnoses: Pancreatitis  Gallstone pancreatitis    Hospital Course:   Gallstone pancreatitis / Cholelithiasis:  Patient admitted for GS pancreatitis. S/P ERCP w/o stone found. Symptoms improved with IVF and pain control. Lap sindy on 11/12. Initially on Zosyn and changed to Augmentin. Due to persistent leukocytosis, additional imaging was done with abnormal fluid collection in GB fossa with inflammatory changes. Gen surgery reconsulted and HIDA is being performed to assess for leak.     Edema:  Likely from IVF given due to SG. Voiding well, but legs still edematous. CXR with low volumes and atelectasis contributing to low sats. CT w/o PE. Lasix has been continued with improvement. LE doppler negative for DVT. Echo ordered as well.     Diabetes mellitus type 2, controlled:  Glucose well controlled. Metformin held post CT.     Essential hypertension:  Well controlled on amlodipine and metoprolol.      ARF (acute renal failure):  Resolved with IVF.     Obesity, unspecified:  Advise weight loss.      Suspect DANILO:  Has trace peripheral edema and bibasilar rales. Info on outpatient sleep study given to patient by CM. Additional hospital course and discharge summary will be done by discharging physician.

## 2018-11-16 NOTE — PROCEDURES
[address]  *** FINAL REPORT ***    Name: Pam Meadows  MRN: [id number]    Inpatient  : 1965  HIS Order #: [id number]  [address] [facility] Visit #: [id number]  Date: 2018    TYPE OF TEST: Peripheral Venous Testing    REASON FOR TEST  Edema    Right Leg:-  Deep venous thrombosis:           No  Superficial venous thrombosis:    No  Deep venous insufficiency:        No  Superficial venous insufficiency: No    Left Leg:-  Deep venous thrombosis:           No  Superficial venous thrombosis:    No  Deep venous insufficiency:        No  Superficial venous insufficiency: No      INTERPRETATION/FINDINGS  PROCEDURE:  BILATERAL LE VENOUS DUPLEX. Evaluation of lower extremity veins with ultrasound (B-mode imaging,  pulsed Doppler, color Doppler). Includes the common femoral, deep  femoral, femoral, popliteal, posterior tibial, peroneal, and great  saphenous veins. FINDINGS: Technically difficult exam due to patient edema. Gray scale  imaging suboptimal. Gray scale and color flow duplex images of the  veins in both lower extremities demonstrate normal compressibility,  spontaneous and augmented flow profiles, and absence of filling  defects throughout the deep and superficial veins in both lower  extremities. CONCLUSION: Bilateral lower extremity venous duplex negative for deep  venous thrombosis or thrombophlebitis. NOTE: Deep venous reflux was  noted in the right popliteal. Enlarged lymphnode noted in the left  groin measuring 1.71 x 0.63 cm. ADDITIONAL COMMENTS    I have personally reviewed the data relevant to the interpretation of  this  study. TECHNOLOGIST: Galindo Ramirez. Rusty  Signed: 2018 08:49 AM    PHYSICIAN: Tawni Spatz.  Neha White MD  Signed: 2018 11:34 AM

## 2018-11-16 NOTE — DIABETES MGMT
DTC Progress Note Recommendations/ Comments: BG begins to trend upwards throughout the day, pt has required 14 units of correction over the past 24 hours. If appropriate, please consider increasing Glipizide to 10mg once daily before breakfast.  
 
Current hospital DM medication:  
-Humalog normal sensitivity correction 
-Glipizide 5mg once daily before breakfast  
-Metformin ER 1,000mg bid Chart reviewed on [facility]. Patient is a 48 y.o. male with known history of Type 2 Diabetes on Metformin ER 1,000mg bid with meals and Glipizide 5mg once daily with dinner at home. A1c:  
Lab Results Component Value Date/Time Hemoglobin A1c 6.9 (H) 07/10/2018 01:45 PM  
 Hemoglobin A1c 8.0 (H) 04/10/2018 01:42 PM  
 
 
Recent Glucose Results:  
Lab Results Component Value Date/Time  (H) 11/16/2018 01:50 AM  
 GLUCPOC 228 (H) 11/16/2018 11:35 AM  
 GLUCPOC 205 (H) 11/16/2018 07:41 AM  
 GLUCPOC 189 (H) 11/16/2018 05:55 AM  
  
 
Lab Results Component Value Date/Time Creatinine 1.08 11/16/2018 01:50 AM  
 
Estimated Creatinine Clearance: 95.2 mL/min (based on SCr of 1.08 mg/dL). Active Orders Diet DIET DIABETIC CONSISTENT CARB Regular PO intake:  
Patient Vitals for the past 72 hrs: 
 % Diet Eaten 11/16/18 1255 10 % 11/15/18 1506 100 % 11/14/18 1510 10 % 11/14/18 0930 25 % 11/13/18 1805 50 % 11/13/18 1411 25 % Will continue to follow as needed. Thank you Quita Bhatti RD, CDE [facility] Office: [phone] Pager: [phone]

## 2018-11-16 NOTE — PROGRESS NOTES
Pt. Requests something to help him sleep. RN notifies Dr. Erin Carcamo. MD states to place order for melatonin 3mg. Will continue to monitor.

## 2018-11-16 NOTE — PROGRESS NOTES
Medical Progress Note NAME: Matilda Moss :  1965 MRM:  [id number] Date/Time: 2018  7:20 AM 
  
Assessment / Plan:  
 
Edema:  Likely from IVF given due to GS. Voiding well, but legs still edematous. Denies sob, but has mild hypoxia. CXR with low volumes and atelectasis contributing to low sats. Is not wearing SCDs. -- lasix 40 mg iv now 
-- check doppler LE as well as CTA chest 
 
Diabetes mellitus type 2, controlled:  Glucose well controlled. A1c 6.9 in July. -- continue metformin and glipizide -- continue SSI Gallstone pancreatitis / Cholelithiasis:  S/P ERCP w/o stone found. Lap sindy on . Bump in LFT likely from ERCP/lap sindy. -- complete course of augmentin 
  
Essential hypertension: 
-- continue amlodipine and metoprolol Fever:  Had isolated fever without recurrence. May be post op fever / ATX. CXR likely atelectasis and poor inspiratory changes. Has persistent leukocytosis, but clinically improved. -- continue IS  
-- finish augmentin ARF (acute renal failure):  Resolved with IVF. 
  
Obesity, unspecified:  Advise weight loss. 
  
Other hyperlipidemia:   
-- hold statin until LFT improved 
  
Suspect DANILO:  Has trace peripheral edema and bibasilar rales. May have some edema due to IVF. No sig hypoxia on 6 mwt. -- outpatient sleep study recommended and info given 
  
**1:10 PM 
CT w/o PE. Doppler neg for DVT. However, persistent inflammatory changes around pancreas and new around hepatic flexure with thickening. Discussed with radiology. Asked gen surg to see. Will plan for repeat abd CT today. Discussed low risk of CARMEN as contrast already given earlier today, but creatinine has normalized here. Patient agrees to proceed. Benefit of repeat contrast outweighs risk. Updated Ms Paez by phone as well with patient's permission. Subjective: Chief Complaint:  edema Legs still feel tight due to swelling. Denies sob or chest pain. ROS: 
(bold if positive, if negative) Objective:  
 
 
Vitals:  
 
 
  
Last 24hrs VS reviewed since prior progress note. Most recent are: 
 
Visit Vitals /75 (BP 1 Location: Left arm, BP Patient Position: At rest) Pulse 81 Temp 98.7 °F (37.1 °C) Resp 16 Ht 5' 8\" (1.727 m) Wt 110.2 kg (243 lb) SpO2 93% BMI 36.95 kg/m² SpO2 Readings from Last 6 Encounters:  
11/16/18 93% O2 Flow Rate (L/min): 2 l/min Intake/Output Summary (Last 24 hours) at 11/16/2018 6078 Last data filed at 11/15/2018 2323 Gross per 24 hour Intake 360 ml Output 1875 ml Net -1515 ml Exam:  
 
Physical Exam: 
 
Gen:  Well-developed, well-nourished, in no acute distress HEENT:  Pink conjunctivae, hearing intact to voice, moist mucous membranes Resp:  No accessory muscle use, clear to auscultation Card:  No murmurs, normal S1, S2 without thrills, 1+ peripheral edema Abd:  Soft, non-tender, non-distended, normoactive bowel sounds are present Neuro: Face symmetric, tongue midline, speech fluent,  strength is 5/5 bilaterally, follows commands appropriately Psych:  Good insight, alert Medications Reviewed: (see below) Lab Data Reviewed: (see below) 
 
______________________________________________________________________ Medications:  
 
Current Facility-Administered Medications Medication Dose Route Frequency  furosemide (LASIX) injection 40 mg  40 mg IntraVENous ONCE  potassium chloride SR (KLOR-CON 10) tablet 40 mEq  40 mEq Oral Q4H  
 amoxicillin-clavulanate (AUGMENTIN) 875-125 mg per tablet 1 Tab  1 Tab Oral BID WITH MEALS  melatonin tablet 3 mg  3 mg Oral QHS PRN  
 HYDROcodone-acetaminophen (NORCO) 7.5-325 mg per tablet 1 Tab  1 Tab Oral Q4H PRN  
 glipiZIDE (GLUCOTROL) tablet 5 mg  5 mg Oral ACB  metFORMIN ER (GLUCOPHAGE XR) tablet 1,000 mg  1,000 mg Oral ACB&D  
 sodium chloride (NS) flush 5-10 mL  5-10 mL IntraVENous Q8H  
  sodium chloride (NS) flush 5-10 mL  5-10 mL IntraVENous PRN  
 ondansetron (ZOFRAN) injection 4 mg  4 mg IntraVENous Q6H PRN  
 benzocaine-menthol (CEPACOL) lozenge 1 Lozenge  1 Lozenge Mucous Membrane PRN  
 amLODIPine (NORVASC) tablet 5 mg  5 mg Oral DAILY  metoprolol tartrate (LOPRESSOR) tablet 25 mg  25 mg Oral Q12H  
 sodium chloride (NS) flush 5-10 mL  5-10 mL IntraVENous Q8H  
 sodium chloride (NS) flush 5-10 mL  5-10 mL IntraVENous PRN  
 insulin lispro (HUMALOG) injection   SubCUTAneous Q6H  
 glucose chewable tablet 16 g  4 Tab Oral PRN  
 dextrose (D50W) injection syrg 12.5-25 g  12.5-25 g IntraVENous PRN  
 glucagon (GLUCAGEN) injection 1 mg  1 mg IntraMUSCular PRN Lab Review:  
 
Recent Labs 11/16/18 
0150 11/15/18 
0557 11/14/18 
1136 WBC 15.1* 13.7* 16.4* HGB 11.1* 11.0* 11.8* HCT 33.4* 33.8* 35.9*  
 319 314 Recent Labs 11/16/18 
0150 11/15/18 
0557 11/14/18 
1136  140 136  
K 3.6 4.1 3.3*  
 103 99 CO2 28 28 27 * 185* 210* BUN 15 12 13 CREA 1.08 1.00 1.05  
CA 8.7 8.1* 8.3* ALB  --  2.0* 2.1* TBILI  --  1.1* 1.3*  
SGOT  --  24 42* ALT  --  79* 106* Lab Results Component Value Date/Time Glucose (POC) 189 (H) 11/16/2018 05:55 AM  
 Glucose (POC) 205 (H) 11/16/2018 01:45 AM  
 Glucose (POC) 231 (H) 11/15/2018 05:52 PM  
 Glucose (POC) 229 (H) 11/15/2018 11:49 AM  
 Glucose (POC) 209 (H) 11/15/2018 07:33 AM  
 
 
Time spent in care:  30 min Care Plan discussed with: Patient and Family Prophylaxis:  SCD, add lovenox Discussed:  Care Plan Disposition:  Home w/Family 
        
___________________________________________________ Attending Physician: Carolin Valentino MD

## 2018-11-16 NOTE — PROGRESS NOTES
General Surgery Daily Progress Note Patient: Jessika Narvaez MRN: [id number]  SSN: [id number] YOB: 1965  Age: 48 y.o. Sex: male Admit Date: 11/8/2018 Subjective:  
Feeling well, pain controlled, tolerating diet. + BM and flatus. Had CTA chest for hypoxia today. Denies SOB currently. Current Facility-Administered Medications Medication Dose Route Frequency  enoxaparin (LOVENOX) injection 40 mg  40 mg SubCUTAneous Q24H  
 docusate sodium (COLACE) capsule 100 mg  100 mg Oral BID  iopamidol (ISOVUE-370) 76 % injection 70 mL  70 mL IntraVENous RAD ONCE  
 amoxicillin-clavulanate (AUGMENTIN) 875-125 mg per tablet 1 Tab  1 Tab Oral BID WITH MEALS  melatonin tablet 3 mg  3 mg Oral QHS PRN  
 HYDROcodone-acetaminophen (NORCO) 7.5-325 mg per tablet 1 Tab  1 Tab Oral Q4H PRN  
 glipiZIDE (GLUCOTROL) tablet 5 mg  5 mg Oral ACB  sodium chloride (NS) flush 5-10 mL  5-10 mL IntraVENous Q8H  
 sodium chloride (NS) flush 5-10 mL  5-10 mL IntraVENous PRN  
 ondansetron (ZOFRAN) injection 4 mg  4 mg IntraVENous Q6H PRN  
 benzocaine-menthol (CEPACOL) lozenge 1 Lozenge  1 Lozenge Mucous Membrane PRN  
 amLODIPine (NORVASC) tablet 5 mg  5 mg Oral DAILY  metoprolol tartrate (LOPRESSOR) tablet 25 mg  25 mg Oral Q12H  
 sodium chloride (NS) flush 5-10 mL  5-10 mL IntraVENous Q8H  
 sodium chloride (NS) flush 5-10 mL  5-10 mL IntraVENous PRN  
 insulin lispro (HUMALOG) injection   SubCUTAneous Q6H  
 glucose chewable tablet 16 g  4 Tab Oral PRN  
 dextrose (D50W) injection syrg 12.5-25 g  12.5-25 g IntraVENous PRN  
 glucagon (GLUCAGEN) injection 1 mg  1 mg IntraMUSCular PRN Objective:  
11/16 0701 - 11/16 1900 In: 120 [P.O.:120] Out: 1100 [Urine:1100] 11/14 1901 - 11/16 0700 In: 960 [P.O.:360; I.V.:600] Out: 0817 [Urine:2255] Patient Vitals for the past 8 hrs: 
 BP Temp Pulse Resp SpO2  
11/16/18 1126 113/71 98.8 °F (37.1 °C) 77 16 98 % 11/16/18 0747 141/80 98.9 °F (37.2 °C) 80 16 93 % Physical Exam: 
General: Alert, cooperative, NAD Lungs: Unlabored Heart:  Regular rate and  rhythm Abdomen: Soft, ATTP, mildly distended. + bowel sounds. Incisions c/d/i. Extremities: Warm, moves all, no edema Skin:  Warm and dry, no rash Labs:  
Recent Labs 11/16/18 
0150 WBC 15.1* HGB 11.1*  
HCT 33.4*  
 Recent Labs 11/16/18 
0150 11/15/18 
0557  140  
K 3.6 4.1  103 CO2 28 28 * 185* BUN 15 12 CREA 1.08 1.00  
CA 8.7 8.1* ALB  --  2.0*  
TBILI  --  1.1*  
SGOT  --  24 ALT  --  79* Assessment / Plan: · Gallstone pancreatitis · Choledocholithiasis · POD#4 lap sindy · Diet as tolerated · PO pain meds · Imaged portion of abdomen on CTA chest showed right retroperitoneal inflammatory changes. Likely 2/2 to pancreatitis. CT A/P pending. Will await results

## 2018-11-17 LAB
ALBUMIN SERPL-MCNC: 2.1 G/DL (ref 3.5–5)
ALBUMIN/GLOB SERPL: 0.4 {RATIO} (ref 1.1–2.2)
ALP SERPL-CCNC: 156 U/L (ref 45–117)
ALT SERPL-CCNC: 51 U/L (ref 12–78)
ANION GAP SERPL CALC-SCNC: 11 MMOL/L (ref 5–15)
AST SERPL-CCNC: 29 U/L (ref 15–37)
BASOPHILS # BLD: 0 K/UL (ref 0–0.1)
BASOPHILS NFR BLD: 0 % (ref 0–1)
BILIRUB SERPL-MCNC: 0.9 MG/DL (ref 0.2–1)
BUN SERPL-MCNC: 13 MG/DL (ref 6–20)
BUN/CREAT SERPL: 11 (ref 12–20)
CALCIUM SERPL-MCNC: 9 MG/DL (ref 8.5–10.1)
CHLORIDE SERPL-SCNC: 100 MMOL/L (ref 97–108)
CO2 SERPL-SCNC: 26 MMOL/L (ref 21–32)
CREAT SERPL-MCNC: 1.21 MG/DL (ref 0.7–1.3)
DIFFERENTIAL METHOD BLD: ABNORMAL
EOSINOPHIL # BLD: 0.3 K/UL (ref 0–0.4)
EOSINOPHIL NFR BLD: 2 % (ref 0–7)
ERYTHROCYTE [DISTWIDTH] IN BLOOD BY AUTOMATED COUNT: 15.9 % (ref 11.5–14.5)
GLOBULIN SER CALC-MCNC: 4.9 G/DL (ref 2–4)
GLUCOSE BLD STRIP.AUTO-MCNC: 219 MG/DL (ref 65–100)
GLUCOSE BLD STRIP.AUTO-MCNC: 229 MG/DL (ref 65–100)
GLUCOSE BLD STRIP.AUTO-MCNC: 247 MG/DL (ref 65–100)
GLUCOSE BLD STRIP.AUTO-MCNC: 252 MG/DL (ref 65–100)
GLUCOSE BLD STRIP.AUTO-MCNC: 317 MG/DL (ref 65–100)
GLUCOSE SERPL-MCNC: 232 MG/DL (ref 65–100)
HCT VFR BLD AUTO: 34.8 % (ref 36.6–50.3)
HGB BLD-MCNC: 11.5 G/DL (ref 12.1–17)
IMM GRANULOCYTES # BLD: 0 K/UL
IMM GRANULOCYTES NFR BLD AUTO: 0 %
LYMPHOCYTES # BLD: 2 K/UL (ref 0.8–3.5)
LYMPHOCYTES NFR BLD: 13 % (ref 12–49)
MAGNESIUM SERPL-MCNC: 2 MG/DL (ref 1.6–2.4)
MCH RBC QN AUTO: 29.1 PG (ref 26–34)
MCHC RBC AUTO-ENTMCNC: 33 G/DL (ref 30–36.5)
MCV RBC AUTO: 88.1 FL (ref 80–99)
METAMYELOCYTES NFR BLD MANUAL: 1 %
MONOCYTES # BLD: 1.1 K/UL (ref 0–1)
MONOCYTES NFR BLD: 7 % (ref 5–13)
NEUTS BAND NFR BLD MANUAL: 3 % (ref 0–6)
NEUTS SEG # BLD: 11.7 K/UL (ref 1.8–8)
NEUTS SEG NFR BLD: 74 % (ref 32–75)
NRBC # BLD: 0 K/UL (ref 0–0.01)
NRBC BLD-RTO: 0 PER 100 WBC
PHOSPHATE SERPL-MCNC: 3.4 MG/DL (ref 2.6–4.7)
PLATELET # BLD AUTO: 447 K/UL (ref 150–400)
PMV BLD AUTO: 11.2 FL (ref 8.9–12.9)
POTASSIUM SERPL-SCNC: 3.8 MMOL/L (ref 3.5–5.1)
PROT SERPL-MCNC: 7 G/DL (ref 6.4–8.2)
RBC # BLD AUTO: 3.95 M/UL (ref 4.1–5.7)
RBC MORPH BLD: ABNORMAL
SERVICE CMNT-IMP: ABNORMAL
SODIUM SERPL-SCNC: 137 MMOL/L (ref 136–145)
WBC # BLD AUTO: 15.2 K/UL (ref 4.1–11.1)

## 2018-11-17 PROCEDURE — 74011636637 HC RX REV CODE- 636/637: Performed by: INTERNAL MEDICINE

## 2018-11-17 PROCEDURE — 82962 GLUCOSE BLOOD TEST: CPT

## 2018-11-17 PROCEDURE — 80053 COMPREHEN METABOLIC PANEL: CPT

## 2018-11-17 PROCEDURE — 96374 THER/PROPH/DIAG INJ IV PUSH: CPT

## 2018-11-17 PROCEDURE — 84100 ASSAY OF PHOSPHORUS: CPT

## 2018-11-17 PROCEDURE — 94760 N-INVAS EAR/PLS OXIMETRY 1: CPT

## 2018-11-17 PROCEDURE — 74011250636 HC RX REV CODE- 250/636: Performed by: INTERNAL MEDICINE

## 2018-11-17 PROCEDURE — 83735 ASSAY OF MAGNESIUM: CPT

## 2018-11-17 PROCEDURE — 74011250637 HC RX REV CODE- 250/637: Performed by: INTERNAL MEDICINE

## 2018-11-17 PROCEDURE — 65270000029 HC RM PRIVATE

## 2018-11-17 PROCEDURE — 74011250637 HC RX REV CODE- 250/637: Performed by: SURGERY

## 2018-11-17 PROCEDURE — 36415 COLL VENOUS BLD VENIPUNCTURE: CPT

## 2018-11-17 PROCEDURE — 85025 COMPLETE CBC W/AUTO DIFF WBC: CPT

## 2018-11-17 RX ORDER — DIPHENHYDRAMINE HCL 25 MG
25 CAPSULE ORAL
Status: DISCONTINUED | OUTPATIENT
Start: 2018-11-17 | End: 2018-11-18 | Stop reason: HOSPADM

## 2018-11-17 RX ORDER — FUROSEMIDE 10 MG/ML
40 INJECTION INTRAMUSCULAR; INTRAVENOUS ONCE
Status: COMPLETED | OUTPATIENT
Start: 2018-11-17 | End: 2018-11-17

## 2018-11-17 RX ADMIN — INSULIN LISPRO 3 UNITS: 100 INJECTION, SOLUTION INTRAVENOUS; SUBCUTANEOUS at 01:23

## 2018-11-17 RX ADMIN — GLIPIZIDE 5 MG: 5 TABLET ORAL at 08:54

## 2018-11-17 RX ADMIN — Medication 10 ML: at 14:19

## 2018-11-17 RX ADMIN — DOCUSATE SODIUM 100 MG: 100 CAPSULE, LIQUID FILLED ORAL at 08:54

## 2018-11-17 RX ADMIN — AMLODIPINE BESYLATE 5 MG: 5 TABLET ORAL at 08:54

## 2018-11-17 RX ADMIN — AMOXICILLIN AND CLAVULANATE POTASSIUM 1 TABLET: 875; 125 TABLET, FILM COATED ORAL at 08:54

## 2018-11-17 RX ADMIN — DIPHENHYDRAMINE HYDROCHLORIDE 25 MG: 25 CAPSULE ORAL at 22:11

## 2018-11-17 RX ADMIN — INSULIN LISPRO 5 UNITS: 100 INJECTION, SOLUTION INTRAVENOUS; SUBCUTANEOUS at 12:30

## 2018-11-17 RX ADMIN — FUROSEMIDE 40 MG: 10 INJECTION, SOLUTION INTRAMUSCULAR; INTRAVENOUS at 14:19

## 2018-11-17 RX ADMIN — INSULIN LISPRO 3 UNITS: 100 INJECTION, SOLUTION INTRAVENOUS; SUBCUTANEOUS at 17:55

## 2018-11-17 RX ADMIN — METOPROLOL TARTRATE 25 MG: 25 TABLET ORAL at 08:54

## 2018-11-17 RX ADMIN — Medication 10 ML: at 07:23

## 2018-11-17 RX ADMIN — INSULIN LISPRO 3 UNITS: 100 INJECTION, SOLUTION INTRAVENOUS; SUBCUTANEOUS at 07:24

## 2018-11-17 RX ADMIN — DOCUSATE SODIUM 100 MG: 100 CAPSULE, LIQUID FILLED ORAL at 17:55

## 2018-11-17 RX ADMIN — AMOXICILLIN AND CLAVULANATE POTASSIUM 1 TABLET: 875; 125 TABLET, FILM COATED ORAL at 17:55

## 2018-11-17 RX ADMIN — ENOXAPARIN SODIUM 40 MG: 40 INJECTION SUBCUTANEOUS at 08:54

## 2018-11-17 RX ADMIN — Medication 10 ML: at 22:13

## 2018-11-17 RX ADMIN — METOPROLOL TARTRATE 25 MG: 25 TABLET ORAL at 22:11

## 2018-11-17 NOTE — ROUTINE PROCESS
Bedside shift change report given to Saida (oncoming nurse) by Daniel Urena (offgoing nurse). Report included the following information SBAR.

## 2018-11-17 NOTE — PROGRESS NOTES
Bedside and Verbal shift change report given to Mickey Davis (oncoming nurse) by Rosalva Martell (offgoing nurse).  Report included the following information SBAR, Kardex, Intake/Output and MAR.

## 2018-11-17 NOTE — PROGRESS NOTES
Pernell Fernandez [facility] 
[address] [facility], [address] [place] [address] 
[phone] Medical Progress Note NAME: Shree Chaidez :  1965 MRM:  [id number] Date/Time: 2018 Assessment / Plan:  
 
Edema: likely iatrogenic from IVF. Legs very edematous. Responded well to IV Lasix, will give another dose today. LE dopplers and CTA negative for VTE Gallstone pancreatitis / Cholelithiasis / acute cholecystitis:  S/P ERCP w/o stone found. Lap sindy on . CT  showed ill-defined collection of fluid and gas in the gallbladder fossa more than would be expected post cholecystectomy. No evidence of leak on HIDA scan. Seen by general surgery, thought air-fluid collection in gallbladder fossa likely from FloSeal hemostatic agent used in gallbladder fossa at the time of surgery. Will monitor for another day given persistent leukocytosis. Continue Augmentin 
  
Diabetes mellitus type 2, controlled:  Glucose well controlled. A1c 6.9 in July. Metformin on hold post IV contrast.  Continue glipizide and SSI 
  
 Essential hypertension: well controlled on amlodipine and metoprolol 
  
Fever: isolated a few days ago, may be post op fever / ATX. CXR likely atelectasis and poor inspiratory changes. Has persistent leukocytosis, but clinically looks well, asking to go home. Continue Augmentin. Follow temp curve and CBC 
  
ARF (acute renal failure):  Resolved with IVF.  
  
Obesity, unspecified: Would benefit from weight loss and dietary / lifestyle modifications Other hyperlipidemia:  holding statin 
  
Suspect DANILO:  Has trace peripheral edema and bibasilar rales. May have some edema due to IVF. No sig hypoxia on 6 mwt. Outpatient sleep study recommended and info given 
  
Total time spent: 35 minutes Time spent in the care of this patient including reviewing records, discussing with nursing and/or other providers on the treatment team, obtaining history and examining the patient, and discussing treatment plans. Care Plan discussed with: Patient, Nursing Staff and >50% of time spent in counseling and coordination of care Discussed:  Care Plan and D/C Planning Prophylaxis:  Lovenox Disposition:  Home w/Family Subjective: Chief Complaint:  Follow up abdominal pain Chart/notes/labs/studies reviewed, patient examined at bedside. Feels better. No fever. Minimal abdominal pain. No n/v/d. Objective:  
 
 
Vitals:  
 
  
Last 24hrs VS reviewed since prior progress note. Most recent are: 
 
Visit Vitals /76 (BP 1 Location: Right arm, BP Patient Position: Sitting) Pulse 73 Temp 98.6 °F (37 °C) Resp 18 Ht 5' 8\" (1.727 m) Wt 110.2 kg (243 lb) SpO2 95% BMI 36.95 kg/m² SpO2 Readings from Last 6 Encounters:  
11/17/18 95% O2 Flow Rate (L/min): 2 l/min Intake/Output Summary (Last 24 hours) at 11/17/2018 1517 Last data filed at 11/17/2018 5304 Gross per 24 hour Intake  Output 950 ml Net -950 ml Exam:  
 
Physical Exam: 
 
Gen:  Well-developed, well-nourished, in no acute distress HEENT:  Pink conjunctivae, hearing intact to voice, moist mucous membranes Resp:  No accessory muscle use, mildly diminished in bases o/w CTAB w/o wheezing, rales, rhonchi 
Card:  No murmurs, normal S1, S2 without thrills, 1+ peripheral edema Abd:  Soft, non-tender, mild distension, normoactive bowel sounds are present. No sig TTP Neuro: Face symmetric, tongue midline, speech fluent,  follows commands appropriately Psych:  Good insight, alert, oriented Medications Reviewed: (see below) Lab Data Reviewed: (see below) 
 
______________________________________________________________________ Medications:  
 
Current Facility-Administered Medications Medication Dose Route Frequency  enoxaparin (LOVENOX) injection 40 mg  40 mg SubCUTAneous Q24H  docusate sodium (COLACE) capsule 100 mg  100 mg Oral BID  amoxicillin-clavulanate (AUGMENTIN) 875-125 mg per tablet 1 Tab  1 Tab Oral BID WITH MEALS  melatonin tablet 3 mg  3 mg Oral QHS PRN  
 HYDROcodone-acetaminophen (NORCO) 7.5-325 mg per tablet 1 Tab  1 Tab Oral Q4H PRN  
 glipiZIDE (GLUCOTROL) tablet 5 mg  5 mg Oral ACB  sodium chloride (NS) flush 5-10 mL  5-10 mL IntraVENous Q8H  
 sodium chloride (NS) flush 5-10 mL  5-10 mL IntraVENous PRN  
 ondansetron (ZOFRAN) injection 4 mg  4 mg IntraVENous Q6H PRN  
 benzocaine-menthol (CEPACOL) lozenge 1 Lozenge  1 Lozenge Mucous Membrane PRN  
 amLODIPine (NORVASC) tablet 5 mg  5 mg Oral DAILY  metoprolol tartrate (LOPRESSOR) tablet 25 mg  25 mg Oral Q12H  
 sodium chloride (NS) flush 5-10 mL  5-10 mL IntraVENous Q8H  
 sodium chloride (NS) flush 5-10 mL  5-10 mL IntraVENous PRN  
 insulin lispro (HUMALOG) injection   SubCUTAneous Q6H  
 glucose chewable tablet 16 g  4 Tab Oral PRN  
 dextrose (D50W) injection syrg 12.5-25 g  12.5-25 g IntraVENous PRN  
 glucagon (GLUCAGEN) injection 1 mg  1 mg IntraMUSCular PRN Lab Review:  
 
Recent Labs 11/17/18 
0631 11/16/18 
0150 11/15/18 
9890 WBC 15.2* 15.1* 13.7* HGB 11.5* 11.1* 11.0*  
HCT 34.8* 33.4* 33.8* * 376 319 Recent Labs 11/17/18 
0631 11/16/18 
0150 11/15/18 
0408  139 140  
K 3.8 3.6 4.1  101 103 CO2 26 28 28 * 224* 185* BUN 13 15 12 CREA 1.21 1.08 1.00  
CA 9.0 8.7 8.1*  
MG 2.0  --   --   
PHOS 3.4  --   --   
ALB 2.1* 2.1* 2.0*  
SGOT 29 45* 24 ALT 51 64 79* No components found for: [place] No results for input(s): PH, PCO2, PO2, HCO3, FIO2 in the last 72 hours. No results for input(s): INR in the last 72 hours. No lab exists for component: INREXT No results found for: SDES Lab Results Component Value Date/Time  Culture result: NO GROWTH 4 DAYS 11/12/2018 03:00 PM  
 Culture result: NO GROWTH 4 DAYS 11/12/2018 03:00 PM  
 
        
___________________________________________________ Attending Physician: Juan Hills MD

## 2018-11-17 NOTE — PROGRESS NOTES
Bedside and Verbal shift change report given to [facility] JOSEFA (oncoming nurse) by Yamila GARCIA RN(offgoing nurse). Report included the following information SBAR and Kardex.

## 2018-11-17 NOTE — PROGRESS NOTES
Surgery POD#5 Lap cholecystectomy No problems overnite, ambulating, voiding, tolerating diet Afeb, vss 
abd obese soft minimally tender Wounds clean HIDA 11/16, no leak Looks good Encourage po and mobilization Home when medically stable F/u Dr. Silvia Ledezma [phone] in 2 weeks.

## 2018-11-18 VITALS
TEMPERATURE: 99.1 F | BODY MASS INDEX: 36.83 KG/M2 | SYSTOLIC BLOOD PRESSURE: 136 MMHG | WEIGHT: 243 LBS | DIASTOLIC BLOOD PRESSURE: 79 MMHG | RESPIRATION RATE: 17 BRPM | HEART RATE: 70 BPM | OXYGEN SATURATION: 98 % | HEIGHT: 68 IN

## 2018-11-18 LAB
ALBUMIN SERPL-MCNC: 2.2 G/DL (ref 3.5–5)
ALBUMIN/GLOB SERPL: 0.5 {RATIO} (ref 1.1–2.2)
ALP SERPL-CCNC: 147 U/L (ref 45–117)
ALT SERPL-CCNC: 50 U/L (ref 12–78)
ANION GAP SERPL CALC-SCNC: 8 MMOL/L (ref 5–15)
AST SERPL-CCNC: 34 U/L (ref 15–37)
BASOPHILS # BLD: 0 K/UL (ref 0–0.1)
BASOPHILS NFR BLD: 0 % (ref 0–1)
BILIRUB SERPL-MCNC: 0.9 MG/DL (ref 0.2–1)
BUN SERPL-MCNC: 13 MG/DL (ref 6–20)
BUN/CREAT SERPL: 10 (ref 12–20)
CALCIUM SERPL-MCNC: 8.8 MG/DL (ref 8.5–10.1)
CHLORIDE SERPL-SCNC: 99 MMOL/L (ref 97–108)
CO2 SERPL-SCNC: 30 MMOL/L (ref 21–32)
CREAT SERPL-MCNC: 1.28 MG/DL (ref 0.7–1.3)
DIFFERENTIAL METHOD BLD: ABNORMAL
EOSINOPHIL # BLD: 0.6 K/UL (ref 0–0.4)
EOSINOPHIL NFR BLD: 4 % (ref 0–7)
ERYTHROCYTE [DISTWIDTH] IN BLOOD BY AUTOMATED COUNT: 15.4 % (ref 11.5–14.5)
EST. AVERAGE GLUCOSE BLD GHB EST-MCNC: 169 MG/DL
GLOBULIN SER CALC-MCNC: 4.6 G/DL (ref 2–4)
GLUCOSE BLD STRIP.AUTO-MCNC: 196 MG/DL (ref 65–100)
GLUCOSE BLD STRIP.AUTO-MCNC: 275 MG/DL (ref 65–100)
GLUCOSE SERPL-MCNC: 231 MG/DL (ref 65–100)
HBA1C MFR BLD: 7.5 % (ref 4.2–6.3)
HCT VFR BLD AUTO: 35.7 % (ref 36.6–50.3)
HGB BLD-MCNC: 11.6 G/DL (ref 12.1–17)
IMM GRANULOCYTES # BLD: 0 K/UL
IMM GRANULOCYTES NFR BLD AUTO: 0 %
LYMPHOCYTES # BLD: 2.3 K/UL (ref 0.8–3.5)
LYMPHOCYTES NFR BLD: 17 % (ref 12–49)
MAGNESIUM SERPL-MCNC: 1.9 MG/DL (ref 1.6–2.4)
MCH RBC QN AUTO: 28.7 PG (ref 26–34)
MCHC RBC AUTO-ENTMCNC: 32.5 G/DL (ref 30–36.5)
MCV RBC AUTO: 88.4 FL (ref 80–99)
MONOCYTES # BLD: 1.4 K/UL (ref 0–1)
MONOCYTES NFR BLD: 10 % (ref 5–13)
MYELOCYTES NFR BLD MANUAL: 1 %
NEUTS SEG # BLD: 9.4 K/UL (ref 1.8–8)
NEUTS SEG NFR BLD: 68 % (ref 32–75)
NRBC # BLD: 0 K/UL (ref 0–0.01)
NRBC BLD-RTO: 0 PER 100 WBC
PHOSPHATE SERPL-MCNC: 4 MG/DL (ref 2.6–4.7)
PLATELET # BLD AUTO: 479 K/UL (ref 150–400)
PMV BLD AUTO: 10.7 FL (ref 8.9–12.9)
POTASSIUM SERPL-SCNC: 3.7 MMOL/L (ref 3.5–5.1)
PROT SERPL-MCNC: 6.8 G/DL (ref 6.4–8.2)
RBC # BLD AUTO: 4.04 M/UL (ref 4.1–5.7)
RBC MORPH BLD: ABNORMAL
SERVICE CMNT-IMP: ABNORMAL
SERVICE CMNT-IMP: ABNORMAL
SODIUM SERPL-SCNC: 137 MMOL/L (ref 136–145)
WBC # BLD AUTO: 13.8 K/UL (ref 4.1–11.1)

## 2018-11-18 PROCEDURE — 84100 ASSAY OF PHOSPHORUS: CPT

## 2018-11-18 PROCEDURE — 83735 ASSAY OF MAGNESIUM: CPT

## 2018-11-18 PROCEDURE — 94760 N-INVAS EAR/PLS OXIMETRY 1: CPT

## 2018-11-18 PROCEDURE — 80053 COMPREHEN METABOLIC PANEL: CPT

## 2018-11-18 PROCEDURE — 74011636637 HC RX REV CODE- 636/637: Performed by: INTERNAL MEDICINE

## 2018-11-18 PROCEDURE — 74011250636 HC RX REV CODE- 250/636: Performed by: INTERNAL MEDICINE

## 2018-11-18 PROCEDURE — 83036 HEMOGLOBIN GLYCOSYLATED A1C: CPT

## 2018-11-18 PROCEDURE — 74011250637 HC RX REV CODE- 250/637: Performed by: INTERNAL MEDICINE

## 2018-11-18 PROCEDURE — 82962 GLUCOSE BLOOD TEST: CPT

## 2018-11-18 PROCEDURE — 85025 COMPLETE CBC W/AUTO DIFF WBC: CPT

## 2018-11-18 PROCEDURE — 36415 COLL VENOUS BLD VENIPUNCTURE: CPT

## 2018-11-18 RX ORDER — TRIAMTERENE/HYDROCHLOROTHIAZID 37.5-25 MG
TABLET ORAL
Qty: 90 TAB | Refills: 1 | Status: SHIPPED
Start: 2018-11-18 | End: 2018-12-04 | Stop reason: SDUPTHER

## 2018-11-18 RX ORDER — METFORMIN HYDROCHLORIDE 500 MG/1
TABLET, EXTENDED RELEASE ORAL
Qty: 360 TAB | Refills: 1 | Status: SHIPPED
Start: 2018-11-18 | End: 2018-12-04

## 2018-11-18 RX ORDER — AMOXICILLIN AND CLAVULANATE POTASSIUM 875; 125 MG/1; MG/1
1 TABLET, FILM COATED ORAL 2 TIMES DAILY WITH MEALS
Qty: 10 TAB | Refills: 0 | Status: SHIPPED | OUTPATIENT
Start: 2018-11-18 | End: 2018-11-23

## 2018-11-18 RX ORDER — TRIAMTERENE/HYDROCHLOROTHIAZID 37.5-25 MG
TABLET ORAL
Qty: 90 TAB | Refills: 1 | Status: SHIPPED
Start: 2018-11-18 | End: 2018-11-18 | Stop reason: SDUPTHER

## 2018-11-18 RX ADMIN — METOPROLOL TARTRATE 25 MG: 25 TABLET ORAL at 08:25

## 2018-11-18 RX ADMIN — ENOXAPARIN SODIUM 40 MG: 40 INJECTION SUBCUTANEOUS at 08:25

## 2018-11-18 RX ADMIN — AMOXICILLIN AND CLAVULANATE POTASSIUM 1 TABLET: 875; 125 TABLET, FILM COATED ORAL at 08:25

## 2018-11-18 RX ADMIN — INSULIN LISPRO 2 UNITS: 100 INJECTION, SOLUTION INTRAVENOUS; SUBCUTANEOUS at 06:42

## 2018-11-18 RX ADMIN — INSULIN LISPRO 7 UNITS: 100 INJECTION, SOLUTION INTRAVENOUS; SUBCUTANEOUS at 00:24

## 2018-11-18 RX ADMIN — GLIPIZIDE 5 MG: 5 TABLET ORAL at 06:42

## 2018-11-18 RX ADMIN — Medication 10 ML: at 06:45

## 2018-11-18 RX ADMIN — AMLODIPINE BESYLATE 5 MG: 5 TABLET ORAL at 08:25

## 2018-11-18 NOTE — DISCHARGE INSTRUCTIONS
HOSPITALIST DISCHARGE INSTRUCTIONS  NAME: Gibson Horta   :  1965   MRN:  [id number]     Date/Time:  2018 8:11 AM    ADMIT DATE: 2018     DISCHARGE DATE: 2018     PRINCIPAL DISCHARGE DIAGNOSES:  Gallstone pancreatitis  Cholecystitis (gall bladder infection)    MEDICATIONS:  · It is important that you take the medication exactly as they are prescribed. Note the changes and additions to your medications. Be sure you understand these changes before you are discharged today. · Keep your medication in the bottles provided by the pharmacist and keep a list of the medication names, dosages, and times to be taken in your wallet. · Do not take other medications without consulting your doctor. Pain Management: per above medications    What to do at Home    Recommended diet:  Resume previous diet    Recommended activity: Activity as tolerated    If you experience any of the following symptoms then please call your primary care physician or return to the emergency room if you cannot get hold of your doctor:  Fever, chills, severe abdominal pain, nausea, vomiting, diarrhea, or other severe concerning symptoms that brought you to the hospital in the first place    Follow Up: Follow-up Information     Follow up With Specialties Details Why Contact Info    Ethan Harry MD General Surgery Schedule an appointment as soon as possible for a visit in 2 weeks  [address]  [phone]      OtherManuel MD   follow up with your primary care doctor next week.  Repeat labs CBC (complete blood count) and BMP (basic metabolic panel) Patient can only remember the practice name and not the physician      Sue Coronado MD Gastroenterology  As needed (gastroenterologist) [facility] [address] Roderick Sahni MD Cardiology  for swelling in legs, diastolic dysfunction [address]  [address] [facility] [address]  [address]  [phone] Information obtained by :  I understand that if any problems occur once I am at home I am to contact my physician. I understand and acknowledge receipt of the instructions indicated above.                                                                                                                                            Physician's or R.N.'s Signature                                                                  Date/Time                                                                                                                                              Patient or Representative Signature                                                          Date/Time

## 2018-11-18 NOTE — PROGRESS NOTES
Problem: Falls - Risk of 
Goal: *Absence of Falls Document Debria Check Fall Risk and appropriate interventions in the flowsheet. Outcome: Progressing Towards Goal 
Fall Risk Interventions: 
Mobility Interventions: Communicate number of staff needed for ambulation/transfer Medication Interventions: Patient to call before getting OOB, Teach patient to arise slowly Elimination Interventions: Call light in reach

## 2018-11-18 NOTE — PROGRESS NOTES
Bedside and Verbal shift change report given to Yamila Aviles RN (oncoming nurse) by Rhoda Roldan RN (offgoing nurse). Report included the following information SBAR, Kardex, Intake/Output, MAR, Accordion, Recent Results, Med Rec Status and Cardiac Rhythm NSR.

## 2018-11-18 NOTE — DISCHARGE SUMMARY
Physician Discharge Summary     Patient ID:  Ida Sue  [id number]  5 y.o.  1965    Admit date: 11/8/2018    Discharge date: 11/18/2018    Admission Diagnoses: Pancreatitis  Gallstone pancreatitis    Principal Discharge Diagnoses:    Gallstone pancreatitis  Acute cholecystitis    OTHER PROBLEMS ADDRESSEDS  Principal Diagnosis Gallstone pancreatitis                                            Principal Problem:    Gallstone pancreatitis (11/8/2018)    Active Problems:    Diabetes mellitus type 2, controlled ([address] (2/16/2013)      Obesity, unspecified (2/16/2013)      Other hyperlipidemia (2/16/2013)      Essential hypertension (2/16/2013)      Pancreatitis (11/8/2018)      Hypokalemia (11/8/2018)      ARF (acute renal failure) ([address] (11/8/2018)      Abnormal LFTs (11/8/2018)      Cholelithiasis (11/8/2018)      Hyperbilirubinemia (11/9/2018)       Patient Active Problem List   Diagnosis Code    Other hyperlipidemia E78.49    Hyperglycemia due to type 2 diabetes mellitus ([address] E11.65    Essential hypertension I10    Obesity, unspecified E66.9    Other and unspecified hyperlipidemia E78.5    Diabetes mellitus type 2, controlled ([address] E11.9    Unspecified essential hypertension I10    Obesity, unspecified E66.9    Severe obesity (BMI 35.0-39. 9) with comorbidity ([address] E66.01    Pancreatitis K85.90    Hypokalemia E87.6    ARF (acute renal failure) ([facility]) N17.9    Abnormal LFTs R94.5    Cholelithiasis K80.20    Gallstone pancreatitis K85.10    Hyperbilirubinemia E80.6         Hospital Course:     Edema: likely iatrogenic from IVF. Does have diastolic dysfunction evident on echo without diagnosis of decompensated congestive heart failure. Improved after IV diuresis. Continue home diuretics. LE dopplers and CTA negative for VTE. Advised following up with a cardiologist as below. Low salt diet advised     Gallstone pancreatitis / Cholelithiasis / acute cholecystitis:  S/P ERCP w/o stone found.  Lap sindy on 11/12. CT 11/16 showed ill-defined collection of fluid and gas in the gallbladder fossa more than would be expected post cholecystectomy. No evidence of leak on HIDA scan. Seen by general surgery, thought air-fluid collection in gallbladder fossa likely from FloSeal hemostatic agent used in gallbladder fossa at the time of surgery. Discussed with general surgery Dr. Stephan Lincoln today, cleared for discharge. Leukocytosis improving. Continue Augmentin at discharge.      Diabetes mellitus type 2, controlled:  Glucose well controlled.  A1c 6.9 in July. Metformin on hold post IV contrast.  Continue glipizide       Essential hypertension: well controlled on amlodipine and metoprolol. Norvasc may be contributing to LE edema. Follow up with PCP and cards     Fever: isolated a few days ago, suspect be post op fever / Roberta Epp likely atelectasis and poor inspiratory changes.  Has persistent leukocytosis but improving. Advised PCP follow up. On Augmentin as above.      ARF (acute renal failure):  Resolved with IVF.      Obesity, unspecified: Would benefit from weight loss and dietary / lifestyle modifications     Other hyperlipidemia:  holding statin     Suspect DANILO:  Has trace peripheral edema and bibasilar rales.  May have some edema due to IVF.  No sig hypoxia on 6 mwt. Outpatient sleep study recommended and info given     Pt discharged in improved and stable condition. Procedures performed: see above    Imaging studies: see above      PCP: Other, MD Manuel    Consults: GI and General Surgery    Discharge Exam:  Patient Vitals for the past 12 hrs:   Temp Pulse Resp BP SpO2   11/18/18 1117 99.1 °F (37.3 °C) 70 17 136/79 98 %   11/18/18 0753 98.3 °F (36.8 °C) 74 17 122/70 98 %     GEN: NAD  CV: RRR. No MRG.  1+ BLE edema, improved  RESP: CTAB  ABD: soft, NTTP      Disposition: home    Patient Instructions:   Current Discharge Medication List      START taking these medications    Details amoxicillin-clavulanate (AUGMENTIN) 875-125 mg per tablet Take 1 Tab by mouth two (2) times daily (with meals) for 5 days. Qty: 10 Tab, Refills: 0      lactobacillus acidoph-pectin (ACIDOPHILUS-PECTIN) 75 million cell -100 mg cap capsule Take 1 Cap by mouth daily. (probiotic)  Qty: 7 Cap, Refills: 0         CONTINUE these medications which have CHANGED    Details   metFORMIN ER (GLUCOPHAGE XR) 500 mg tablet Do not resume this until Monday 11/19  TAKE 2 TABLETS DAILY WITH BREAKFAST AND THEN 2 TABLETS DAILY WITH DINNER  Qty: 360 Tab, Refills: 1    Associated Diagnoses: Controlled type 2 diabetes mellitus without complication, without long-term current use of insulin ([facility])      triamterene-hydroCHLOROthiazide (MAXZIDE) 37.5-25 mg per tablet TAKE ONE TABLET BY MOUTH ONE TIME DAILY  Qty: 90 Tab, Refills: 1    Associated Diagnoses: Essential hypertension         CONTINUE these medications which have NOT CHANGED    Details   aspirin delayed-release 81 mg tablet Take 81 mg by mouth daily (with dinner). metoprolol tartrate (LOPRESSOR) 25 mg tablet TAKE ONE TABLET BY MOUTH TWICE DAILY FOR HYPERTENSION  Qty: 180 Tab, Refills: 1    Associated Diagnoses: Essential hypertension      amLODIPine (NORVASC) 5 mg tablet Take 1 Tab by mouth daily.   Qty: 90 Tab, Refills: 1    Associated Diagnoses: Essential hypertension      glipiZIDE (GLUCOTROL) 5 mg tablet 1 po qday 30 min ac dinner  Qty: 90 Tab, Refills: 1    Associated Diagnoses: Controlled type 2 diabetes mellitus without complication, without long-term current use of insulin ([facility]             Activity: See discharge instructions  Diet: See discharge instructions  Wound Care: See discharge instructions    Follow-up Information     Follow up With Specialties Details Why Contact Info    Rah Wayne MD General Surgery Schedule an appointment as soon as possible for a visit in 2 weeks  [address]  [phone]      Other, Pastor Smith MD follow up with your primary care doctor next week. Repeat labs CBC (complete blood count) and BMP (basic metabolic panel) Patient can only remember the practice name and not the physician      JoseJ uan Terrell MD Gastroenterology  As needed (gastroenterologist) [address] [place] [address]  [phone]      Malu Flannery MD Cardiology  for swelling in legs, diastolic dysfunction [address] [address] [facility]  [phone]            I spent 35 minutes on this discharge.     Signed:  Porfirio Galaviz MD  11/18/2018  8:13 AM

## 2018-11-18 NOTE — PROGRESS NOTES
Bedside and Verbal shift change report given to MARIJA Reid (oncoming nurse) by Afua Morgan (offgoing nurse). Report included the following information SBAR and Kardex.

## 2018-12-04 ENCOUNTER — OFFICE VISIT (OUTPATIENT)
Dept: FAMILY MEDICINE CLINIC | Age: 53
End: 2018-12-04

## 2018-12-04 ENCOUNTER — HOSPITAL ENCOUNTER (OUTPATIENT)
Dept: LAB | Age: 53
Discharge: HOME | End: 2018-12-04

## 2018-12-04 VITALS
HEART RATE: 79 BPM | HEIGHT: 68 IN | BODY MASS INDEX: 33.49 KG/M2 | WEIGHT: 221 LBS | SYSTOLIC BLOOD PRESSURE: 127 MMHG | DIASTOLIC BLOOD PRESSURE: 77 MMHG | TEMPERATURE: 98.2 F

## 2018-12-04 DIAGNOSIS — Z13.9 ENCOUNTER FOR SCREENING: Primary | ICD-10-CM

## 2018-12-04 DIAGNOSIS — E87.6 HYPOKALEMIA: ICD-10-CM

## 2018-12-04 DIAGNOSIS — I10 ESSENTIAL HYPERTENSION: ICD-10-CM

## 2018-12-04 DIAGNOSIS — E11.9 CONTROLLED TYPE 2 DIABETES MELLITUS WITHOUT COMPLICATION, WITHOUT LONG-TERM CURRENT USE OF INSULIN (HCC): ICD-10-CM

## 2018-12-04 PROBLEM — E11.21 TYPE 2 DIABETES WITH NEPHROPATHY (HCC): Status: ACTIVE | Noted: 2018-12-04

## 2018-12-04 LAB
ALBUMIN SERPL-MCNC: 4.1 G/DL (ref 3.5–5)
ALBUMIN/GLOB SERPL: 1 {RATIO} (ref 1.1–2.2)
ALP SERPL-CCNC: 141 U/L (ref 45–117)
ALT SERPL-CCNC: 70 U/L (ref 12–78)
ANION GAP SERPL CALC-SCNC: 10 MMOL/L (ref 5–15)
AST SERPL-CCNC: 42 U/L (ref 15–37)
BASOPHILS # BLD: 0.1 K/UL (ref 0–0.1)
BASOPHILS NFR BLD: 1 % (ref 0–1)
BILIRUB SERPL-MCNC: 0.8 MG/DL (ref 0.2–1)
BUN SERPL-MCNC: 18 MG/DL (ref 6–20)
BUN/CREAT SERPL: 12 (ref 12–20)
CALCIUM SERPL-MCNC: 10 MG/DL (ref 8.5–10.1)
CHLORIDE SERPL-SCNC: 98 MMOL/L (ref 97–108)
CO2 SERPL-SCNC: 25 MMOL/L (ref 21–32)
CREAT SERPL-MCNC: 1.54 MG/DL (ref 0.7–1.3)
DIFFERENTIAL METHOD BLD: ABNORMAL
EOSINOPHIL # BLD: 0.3 K/UL (ref 0–0.4)
EOSINOPHIL NFR BLD: 4 % (ref 0–7)
ERYTHROCYTE [DISTWIDTH] IN BLOOD BY AUTOMATED COUNT: 13.5 % (ref 11.5–14.5)
GLOBULIN SER CALC-MCNC: 4.2 G/DL (ref 2–4)
GLUCOSE POC: NORMAL MG/DL
GLUCOSE SERPL-MCNC: 163 MG/DL (ref 65–100)
HCT VFR BLD AUTO: 44.3 % (ref 36.6–50.3)
HGB BLD-MCNC: 14.5 G/DL (ref 12.1–17)
IMM GRANULOCYTES # BLD: 0 K/UL (ref 0–0.04)
IMM GRANULOCYTES NFR BLD AUTO: 0 % (ref 0–0.5)
LYMPHOCYTES # BLD: 3.9 K/UL (ref 0.8–3.5)
LYMPHOCYTES NFR BLD: 53 % (ref 12–49)
MCH RBC QN AUTO: 28.8 PG (ref 26–34)
MCHC RBC AUTO-ENTMCNC: 32.7 G/DL (ref 30–36.5)
MCV RBC AUTO: 88.1 FL (ref 80–99)
MONOCYTES # BLD: 0.7 K/UL (ref 0–1)
MONOCYTES NFR BLD: 10 % (ref 5–13)
NEUTS SEG # BLD: 2.4 K/UL (ref 1.8–8)
NEUTS SEG NFR BLD: 32 % (ref 32–75)
NRBC # BLD: 0 K/UL (ref 0–0.01)
NRBC BLD-RTO: 0 PER 100 WBC
PLATELET # BLD AUTO: 426 K/UL (ref 150–400)
PMV BLD AUTO: 10.8 FL (ref 8.9–12.9)
POTASSIUM SERPL-SCNC: 4.1 MMOL/L (ref 3.5–5.1)
PROT SERPL-MCNC: 8.3 G/DL (ref 6.4–8.2)
RBC # BLD AUTO: 5.03 M/UL (ref 4.1–5.7)
SODIUM SERPL-SCNC: 133 MMOL/L (ref 136–145)
WBC # BLD AUTO: 7.4 K/UL (ref 4.1–11.1)

## 2018-12-04 PROCEDURE — 80053 COMPREHEN METABOLIC PANEL: CPT

## 2018-12-04 PROCEDURE — 85025 COMPLETE CBC W/AUTO DIFF WBC: CPT

## 2018-12-04 RX ORDER — GLIPIZIDE 5 MG/1
TABLET ORAL
Qty: 90 TAB | Refills: 1 | Status: SHIPPED | OUTPATIENT
Start: 2018-12-04 | End: 2019-01-03 | Stop reason: SDUPTHER

## 2018-12-04 RX ORDER — METFORMIN HYDROCHLORIDE 500 MG/1
TABLET, EXTENDED RELEASE ORAL
Qty: 360 TAB | Refills: 1 | Status: SHIPPED | OUTPATIENT
Start: 2018-12-04 | End: 2019-02-11 | Stop reason: SDUPTHER

## 2018-12-04 RX ORDER — TRIAMTERENE/HYDROCHLOROTHIAZID 37.5-25 MG
TABLET ORAL
Qty: 90 TAB | Refills: 1 | Status: SHIPPED | OUTPATIENT
Start: 2018-12-04 | End: 2019-02-11 | Stop reason: SDUPTHER

## 2018-12-04 RX ORDER — METOPROLOL TARTRATE 25 MG/1
TABLET, FILM COATED ORAL
Qty: 180 TAB | Refills: 1 | Status: SHIPPED | OUTPATIENT
Start: 2018-12-04 | End: 2019-02-11 | Stop reason: SDUPTHER

## 2018-12-04 RX ORDER — AMLODIPINE BESYLATE 5 MG/1
5 TABLET ORAL DAILY
Qty: 90 TAB | Refills: 1 | Status: SHIPPED | OUTPATIENT
Start: 2018-12-04 | End: 2019-01-03 | Stop reason: SDUPTHER

## 2018-12-04 NOTE — PATIENT INSTRUCTIONS
Coordination of Care  1. Have you been to the ER, urgent care clinic since your last visit? Hospitalized since your last visit? Yes When: 11/2018, [facility] [place], [place] Vivienne CORRALES    2. Have you seen or consulted any other health care providers outside of the [facility] since your last visit? Include any pap smears or colon screening. No    Does the patient need refills? YES    Learning Assessment Complete?  yes  Depression Screening complete in the past 12 months? yes  Results for orders placed or performed in visit on 12/04/18   AMB POC GLUCOSE BLOOD, BY GLUCOSE MONITORING DEVICE   Result Value Ref Range    Glucose POC  mg/dL

## 2018-12-04 NOTE — PROGRESS NOTES
AVS printed and reviewed. E scripts discussed. Good Rx coupons printed for [facility]. Carly[facility] has most meds cheaper and Good Rx site reviewed w/ client. Instructed to schedule f/u at registration prior to leaving clinic today.

## 2018-12-04 NOTE — PROGRESS NOTES
Assessment/Plan:       ICD-10-CM ICD-9-CM    1. Encounter for screening Z13.9 V82.9 AMB POC GLUCOSE BLOOD, BY GLUCOSE MONITORING DEVICE   2. Controlled type 2 diabetes mellitus without complication, without long-term current use of insulin (HCC) E11.9 250.00 metFORMIN ER (GLUCOPHAGE XR) 500 mg tablet      glipiZIDE (GLUCOTROL) 5 mg tablet   3. Hypokalemia V28.7 985.5 METABOLIC PANEL, COMPREHENSIVE      CBC WITH AUTOMATED DIFF   4. Essential hypertension I10 401.9 triamterene-hydroCHLOROthiazide (MAXZIDE) 37.5-25 mg per tablet      metoprolol tartrate (LOPRESSOR) 25 mg tablet      amLODIPine (NORVASC) 5 mg tablet    Call with lab results  Follow-up Disposition:  Return for 2nd Monday Branch's follow up. [facility] [address] [facility]  [address]  Elsa Tred[address] [address]  Phone: [phone] Fax: [phone]    [facility] [address] [place] [address]  Phone: [phone] Fax: [phone]      [facility]  [address]  Subjective:   No chief complaint on file. Aixa Lewis is a 48 y.o. PATIENT REFUSED male who speaks [place].  used? no Sleep apnea study. He has the paperwork. HPI: was hospitalized for pancreatitis. Feeling weak, slowly recovering. ROS:   No increased frequency of urination, no increased thirst; no chest pain, dyspnea or TIA's; no numbness, tingling or pain in extremities; no reports of hypoglycemia.    Medications:    Current Outpatient Medications:     metFORMIN ER (GLUCOPHAGE XR) 500 mg tablet, TAKE 2 TABLETS DAILY WITH BREAKFAST AND THEN 2 TABLETS DAILY WITH DINNER, Disp: 360 Tab, Rfl: 1    triamterene-hydroCHLOROthiazide (MAXZIDE) 37.5-25 mg per tablet, TAKE ONE TABLET BY MOUTH ONE TIME DAILY, Disp: 90 Tab, Rfl: 1    metoprolol tartrate (LOPRESSOR) 25 mg tablet, TAKE ONE TABLET BY MOUTH TWICE DAILY FOR HYPERTENSION, Disp: 180 Tab, Rfl: 1    amLODIPine (NORVASC) 5 mg tablet, Take 1 Tab by mouth daily. , Disp: 90 Tab, Rfl: 1    glipiZIDE (GLUCOTROL) 5 mg tablet, 1 po qday 30 min ac dinner, Disp: 90 Tab, Rfl: 1    lactobacillus acidoph-pectin (ACIDOPHILUS-PECTIN) 75 million cell -100 mg cap capsule, Take 1 Cap by mouth daily. (probiotic), Disp: 7 Cap, Rfl: 0    aspirin delayed-release 81 mg tablet, Take 81 mg by mouth daily (with dinner). , Disp: , Rfl:   Social History: He reports that  has never smoked. he has never used smokeless tobacco. He reports that he does not drink alcohol or use drugs. Family History: His Family history is unknown by patient. Objective:     Vitals:    12/04/18 1330   BP: 127/77   Pulse: 79   Temp: 98.2 °F (36.8 °C)   TempSrc: Oral   Weight: 221 lb (100.2 kg)   Height: 5' 7.99\" (1.727 m)    No LMP for male patient. Results for orders placed or performed in visit on 12/04/18   AMB POC GLUCOSE BLOOD, BY GLUCOSE MONITORING DEVICE   Result Value Ref Range    Glucose POC  mg/dL      Wt Readings from Last 3 Encounters:   12/04/18 221 lb (100.2 kg)   11/08/18 243 lb (110.2 kg)   11/08/18 242 lb 15.2 oz (110.2 kg)     Current Outpatient Medications   Medication Sig Dispense Refill    metFORMIN ER (GLUCOPHAGE XR) 500 mg tablet TAKE 2 TABLETS DAILY WITH BREAKFAST AND THEN 2 TABLETS DAILY WITH DINNER 360 Tab 1    triamterene-hydroCHLOROthiazide (MAXZIDE) 37.5-25 mg per tablet TAKE ONE TABLET BY MOUTH ONE TIME DAILY 90 Tab 1    metoprolol tartrate (LOPRESSOR) 25 mg tablet TAKE ONE TABLET BY MOUTH TWICE DAILY FOR HYPERTENSION 180 Tab 1    amLODIPine (NORVASC) 5 mg tablet Take 1 Tab by mouth daily. 90 Tab 1    glipiZIDE (GLUCOTROL) 5 mg tablet 1 po qday 30 min ac dinner 90 Tab 1    lactobacillus acidoph-pectin (ACIDOPHILUS-PECTIN) 75 million cell -100 mg cap capsule Take 1 Cap by mouth daily. (probiotic) 7 Cap 0    aspirin delayed-release 81 mg tablet Take 81 mg by mouth daily (with dinner).            Wt Readings from Last 2 Encounters:   12/04/18 221 lb (100.2 kg)   11/08/18 243 lb (110.2 kg)    Weight decreased; Hemoglobin A1c   Date Value Ref Range Status   11/18/2018 7.5 (H) 4.2 - 6.3 % Final   07/10/2018 6.9 (H) 4.2 - 6.3 % Final    A1C increased;   Lab Results   Component Value Date/Time    Microalbumin/Creat ratio (mg/g creat) 5 04/10/2018 01:42 PM    Microalbumin,urine random 0.65 04/10/2018 01:42 PM    Creatinine 1.28 11/18/2018 06:47 AM      Lab Results   Component Value Date/Time    GFR est AA >60 11/18/2018 06:47 AM    GFR est non-AA 59 (L) 11/18/2018 06:47 AM       Lab Results   Component Value Date/Time    Cholesterol, total 199 04/10/2018 01:42 PM    HDL Cholesterol 50 04/10/2018 01:42 PM    LDL, calculated 120.6 (H) 04/10/2018 01:42 PM    Triglyceride 142 04/10/2018 01:42 PM    CHOL/HDL Ratio 4.0 04/10/2018 01:42 PM      Lab Results   Component Value Date/Time    ALT (SGPT) 50 11/18/2018 06:47 AM    AST (SGOT) 34 11/18/2018 06:47 AM    Alk. phosphatase 147 (H) 11/18/2018 06:47 AM    Bilirubin, direct 0.4 (H) 11/16/2018 01:50 AM    Bilirubin, total 0.9 11/18/2018 06:47 AM     Constitutional: He appears well-developed. Eyes: EOM are normal. Pupils are equal, round, and reactive to light. Neck: Neck supple. No thyromegaly present. Cardiovascular: Normal rate, regular rhythm, normal heart sounds and intact distal pulses. No murmur heard. Pulmonary/Chest: Effort normal and breath sounds normal.   Musculoskeletal: He exhibits no edema. No ulcers of the lower extremities. Assessment/Plan:   Diagnoses and all orders for this visit:    1. Encounter for screening  -     AMB POC GLUCOSE BLOOD, BY GLUCOSE MONITORING DEVICE    2.  Controlled type 2 diabetes mellitus without complication, without long-term current use of insulin (HCC)  -     metFORMIN ER (GLUCOPHAGE XR) 500 mg tablet; TAKE 2 TABLETS DAILY WITH BREAKFAST AND THEN 2 TABLETS DAILY WITH DINNER  -     glipiZIDE (GLUCOTROL) 5 mg tablet; 1 po qday 30 min ac dinner    3. Hypokalemia  -     METABOLIC PANEL, COMPREHENSIVE; Future  -     CBC WITH AUTOMATED DIFF; Future    4. Essential hypertension  -     triamterene-hydroCHLOROthiazide (MAXZIDE) 37.5-25 mg per tablet; TAKE ONE TABLET BY MOUTH ONE TIME DAILY  -     metoprolol tartrate (LOPRESSOR) 25 mg tablet; TAKE ONE TABLET BY MOUTH TWICE DAILY FOR HYPERTENSION  -     amLODIPine (NORVASC) 5 mg tablet; Take 1 Tab by mouth daily. Diabetes Mellitus type 2, under Good control. Blood pressure under Good control. Greater than 50% of this 25 minute visit was spent in face-to-face counseling/coordination of care regarding diabetes management. Follow-up Disposition:  Return for 2nd Monday Branch's follow up. Kelsey Wang, KARMA, FNP-BC, BC-ADM  Gibson [employer]fast expressed understanding of this plan.

## 2018-12-05 ENCOUNTER — TELEPHONE (OUTPATIENT)
Dept: FAMILY MEDICINE CLINIC | Age: 53
End: 2018-12-05

## 2018-12-05 DIAGNOSIS — N28.9 RENAL INSUFFICIENCY: Primary | ICD-10-CM

## 2018-12-05 NOTE — TELEPHONE ENCOUNTER
Lab results: kidney function is slightly lower than it was when he left the hospital.  I'd like to recheck labs again in early January (BMP).   May walk in to any site, does not have to fast.  [facility] on January 5th is in the [facility].

## 2018-12-16 DIAGNOSIS — I10 ESSENTIAL HYPERTENSION: ICD-10-CM

## 2018-12-16 DIAGNOSIS — E11.9 CONTROLLED TYPE 2 DIABETES MELLITUS WITHOUT COMPLICATION, WITHOUT LONG-TERM CURRENT USE OF INSULIN (HCC): ICD-10-CM

## 2018-12-28 RX ORDER — AMLODIPINE BESYLATE 5 MG/1
TABLET ORAL
Qty: 90 TAB | Refills: 1 | OUTPATIENT
Start: 2018-12-28

## 2018-12-28 RX ORDER — GLIPIZIDE 5 MG/1
TABLET ORAL
Qty: 90 TAB | Refills: 1 | OUTPATIENT
Start: 2018-12-28

## 2019-01-02 DIAGNOSIS — I10 ESSENTIAL HYPERTENSION: ICD-10-CM

## 2019-01-02 DIAGNOSIS — E11.9 CONTROLLED TYPE 2 DIABETES MELLITUS WITHOUT COMPLICATION, WITHOUT LONG-TERM CURRENT USE OF INSULIN (HCC): ICD-10-CM

## 2019-01-03 RX ORDER — AMLODIPINE BESYLATE 5 MG/1
TABLET ORAL
Qty: 90 TAB | Refills: 1 | Status: SHIPPED | OUTPATIENT
Start: 2019-01-03 | End: 2019-02-11 | Stop reason: SDUPTHER

## 2019-01-03 RX ORDER — GLIPIZIDE 5 MG/1
TABLET ORAL
Qty: 90 TAB | Refills: 1 | Status: SHIPPED | OUTPATIENT
Start: 2019-01-03 | End: 2019-02-11 | Stop reason: SDUPTHER

## 2019-02-11 ENCOUNTER — HOSPITAL ENCOUNTER (OUTPATIENT)
Dept: LAB | Age: 54
Discharge: HOME | End: 2019-02-11

## 2019-02-11 ENCOUNTER — OFFICE VISIT (OUTPATIENT)
Dept: FAMILY MEDICINE CLINIC | Age: 54
End: 2019-02-11

## 2019-02-11 VITALS
HEART RATE: 57 BPM | SYSTOLIC BLOOD PRESSURE: 124 MMHG | BODY MASS INDEX: 34.07 KG/M2 | TEMPERATURE: 98.4 F | DIASTOLIC BLOOD PRESSURE: 79 MMHG | WEIGHT: 224 LBS

## 2019-02-11 DIAGNOSIS — Z13.9 ENCOUNTER FOR SCREENING: ICD-10-CM

## 2019-02-11 DIAGNOSIS — L98.9 SKIN LESION OF CHEST WALL: ICD-10-CM

## 2019-02-11 DIAGNOSIS — Z13.9 ENCOUNTER FOR SCREENING: Primary | ICD-10-CM

## 2019-02-11 DIAGNOSIS — I10 ESSENTIAL HYPERTENSION: ICD-10-CM

## 2019-02-11 DIAGNOSIS — E11.9 CONTROLLED TYPE 2 DIABETES MELLITUS WITHOUT COMPLICATION, WITHOUT LONG-TERM CURRENT USE OF INSULIN (HCC): ICD-10-CM

## 2019-02-11 LAB
ANION GAP SERPL CALC-SCNC: 8 MMOL/L (ref 5–15)
BUN SERPL-MCNC: 15 MG/DL (ref 6–20)
BUN/CREAT SERPL: 12 (ref 12–20)
CALCIUM SERPL-MCNC: 9.7 MG/DL (ref 8.5–10.1)
CHLORIDE SERPL-SCNC: 102 MMOL/L (ref 97–108)
CO2 SERPL-SCNC: 29 MMOL/L (ref 21–32)
CREAT SERPL-MCNC: 1.21 MG/DL (ref 0.7–1.3)
EST. AVERAGE GLUCOSE BLD GHB EST-MCNC: 134 MG/DL
GLUCOSE POC: NORMAL MG/DL
GLUCOSE SERPL-MCNC: 98 MG/DL (ref 65–100)
HBA1C MFR BLD: 6.3 % (ref 4.2–6.3)
POTASSIUM SERPL-SCNC: 4.8 MMOL/L (ref 3.5–5.1)
SODIUM SERPL-SCNC: 139 MMOL/L (ref 136–145)

## 2019-02-11 PROCEDURE — 83036 HEMOGLOBIN GLYCOSYLATED A1C: CPT

## 2019-02-11 PROCEDURE — 80048 BASIC METABOLIC PNL TOTAL CA: CPT

## 2019-02-11 RX ORDER — GLIPIZIDE 5 MG/1
TABLET ORAL
Qty: 90 TAB | Refills: 1 | Status: SHIPPED | OUTPATIENT
Start: 2019-02-11 | End: 2019-07-30 | Stop reason: SDUPTHER

## 2019-02-11 RX ORDER — METOPROLOL TARTRATE 25 MG/1
TABLET, FILM COATED ORAL
Qty: 180 TAB | Refills: 1 | Status: SHIPPED | OUTPATIENT
Start: 2019-02-11 | End: 2019-07-30 | Stop reason: SDUPTHER

## 2019-02-11 RX ORDER — METFORMIN HYDROCHLORIDE 500 MG/1
TABLET, EXTENDED RELEASE ORAL
Qty: 360 TAB | Refills: 1 | Status: SHIPPED | OUTPATIENT
Start: 2019-02-11 | End: 2019-07-30 | Stop reason: SDUPTHER

## 2019-02-11 RX ORDER — AMLODIPINE BESYLATE 5 MG/1
TABLET ORAL
Qty: 90 TAB | Refills: 1 | Status: SHIPPED | OUTPATIENT
Start: 2019-02-11 | End: 2019-07-30 | Stop reason: SDUPTHER

## 2019-02-11 RX ORDER — TRIAMTERENE/HYDROCHLOROTHIAZID 37.5-25 MG
TABLET ORAL
Qty: 90 TAB | Refills: 1 | Status: SHIPPED | OUTPATIENT
Start: 2019-02-11 | End: 2019-07-30 | Stop reason: SDUPTHER

## 2019-02-11 NOTE — PROGRESS NOTES
Results for orders placed or performed in visit on 02/11/19   AMB POC GLUCOSE BLOOD, BY GLUCOSE MONITORING DEVICE   Result Value Ref Range    Glucose POC nf 98 mg/dL     Coordination of Care  1. Have you been to the ER, urgent care clinic since your last visit? Hospitalized since your last visit? No    2. Have you seen or consulted any other health care providers outside of the [address] [place] [address] since your last visit? Include any pap smears or colon screening. No    Does the patient need refills? YES    Learning Assessment Complete?  yes  Depression Screening complete in the past 12 months? yes

## 2019-02-11 NOTE — PROGRESS NOTES
Assessment/Plan:       ICD-10-CM ICD-9-CM    1. Encounter for screening Z13.9 V82.9 AMB POC GLUCOSE BLOOD, BY GLUCOSE MONITORING DEVICE      METABOLIC PANEL, BASIC      HEMOGLOBIN A1C WITH EAG   2. Skin lesion of chest wall L98.9 709.9    3. Controlled type 2 diabetes mellitus without complication, without long-term current use of insulin (HCC) E11.9 250.00 metFORMIN ER (GLUCOPHAGE XR) 500 mg tablet      glipiZIDE (GLUCOTROL) 5 mg tablet   4. Essential hypertension I10 401.9 triamterene-hydroCHLOROthiazide (MAXZIDE) 37.5-25 mg per tablet      metoprolol tartrate (LOPRESSOR) 25 mg tablet      amLODIPine (NORVASC) 5 mg tablet      Follow-up Disposition:  Return in about 3 months (around 5/11/2019) for diabetes. [facility] [address] [place] [facility] - [address] [address]  Phone: [phone] Fax: [phone] [place] [address] [place] [address] [address] [place] [address]  Phone: [phone] Fax: [phone]      [facility]  Subjective:     Chief Complaint   Patient presents with    Medication Refill     diabetes    Yoan Tom is a 47 y.o. PATIENT REFUSED male who speaks [place]. ROS:   No increased frequency of urination, no increased thirst; no chest pain, dyspnea or TIA's; no numbness, tingling or pain in extremities; no reports of hypoglycemia.    Medications:    Current Outpatient Medications:     metFORMIN ER (GLUCOPHAGE XR) 500 mg tablet, TAKE 2 TABLETS DAILY WITH BREAKFAST AND THEN 2 TABLETS DAILY WITH DINNER, Disp: 360 Tab, Rfl: 1    triamterene-hydroCHLOROthiazide (MAXZIDE) 37.5-25 mg per tablet, TAKE ONE TABLET BY MOUTH ONE TIME DAILY, Disp: 90 Tab, Rfl: 1    metoprolol tartrate (LOPRESSOR) 25 mg tablet, TAKE ONE TABLET BY MOUTH TWICE DAILY FOR HYPERTENSION, Disp: 180 Tab, Rfl: 1    glipiZIDE (GLUCOTROL) 5 mg tablet, TAKE 1 TABLET BY MOUTH EVERY DAY 30 MINUTES BEFORE DINNER, Disp: 90 Tab, Rfl: 1    amLODIPine (NORVASC) 5 mg tablet, TAKE 1 TABLET BY MOUTH EVERY DAY, Disp: 90 Tab, Rfl: 1    lactobacillus acidoph-pectin (ACIDOPHILUS-PECTIN) 75 million cell -100 mg cap capsule, Take 1 Cap by mouth daily. (probiotic), Disp: 7 Cap, Rfl: 0    aspirin delayed-release 81 mg tablet, Take 81 mg by mouth daily (with dinner). , Disp: , Rfl:   Social History: He reports that  has never smoked. he has never used smokeless tobacco. He reports that he does not drink alcohol or use drugs. Family History: His Family history is unknown by patient. Objective:     Vitals:    02/11/19 1301   BP: 124/79   Pulse: (!) 57   Temp: 98.4 °F (36.9 °C)   TempSrc: Oral   Weight: 224 lb (101.6 kg)    No LMP for male patient. Results for orders placed or performed during the hospital encounter of 35/63/53   METABOLIC PANEL, BASIC   Result Value Ref Range    Sodium 139 136 - 145 mmol/L    Potassium 4.8 3.5 - 5.1 mmol/L    Chloride 102 97 - 108 mmol/L    CO2 29 21 - 32 mmol/L    Anion gap 8 5 - 15 mmol/L    Glucose 98 65 - 100 mg/dL    BUN 15 6 - 20 MG/DL    Creatinine 1.21 0.70 - 1.30 MG/DL    BUN/Creatinine ratio 12 12 - 20      GFR est AA >60 >60 ml/min/1.73m2    GFR est non-AA >60 >60 ml/min/1.73m2    Calcium 9.7 8.5 - 10.1 MG/DL   HEMOGLOBIN A1C WITH EAG   Result Value Ref Range    Hemoglobin A1c 6.3 4.2 - 6.3 %    Est. average glucose 134 mg/dL   Results for orders placed or performed in visit on 02/11/19   AMB POC GLUCOSE BLOOD, BY GLUCOSE MONITORING DEVICE   Result Value Ref Range    Glucose POC nf 98 mg/dL          Wt Readings from Last 2 Encounters:   02/11/19 224 lb (101.6 kg)   12/04/18 221 lb (100.2 kg)    Weight increased;    Hemoglobin A1c   Date Value Ref Range Status   11/18/2018 7.5 (H) 4.2 - 6.3 % Final   07/10/2018 6.9 (H) 4.2 - 6.3 % Final    A1C increased;   Lab Results   Component Value Date/Time    Microalbumin/Creat ratio (mg/g creat) 5 04/10/2018 01:42 PM    Microalbumin,urine random 0.65 04/10/2018 01:42 PM    Creatinine 1.54 (H) 12/04/2018 03:07 PM      Lab Results   Component Value Date/Time    GFR est AA 58 (L) 12/04/2018 03:07 PM    GFR est non-AA 47 (L) 12/04/2018 03:07 PM       Lab Results   Component Value Date/Time    Cholesterol, total 199 04/10/2018 01:42 PM    HDL Cholesterol 50 04/10/2018 01:42 PM    LDL, calculated 120.6 (H) 04/10/2018 01:42 PM    Triglyceride 142 04/10/2018 01:42 PM    CHOL/HDL Ratio 4.0 04/10/2018 01:42 PM      Lab Results   Component Value Date/Time    ALT (SGPT) 70 12/04/2018 03:07 PM    AST (SGOT) 42 (H) 12/04/2018 03:07 PM    Alk. phosphatase 141 (H) 12/04/2018 03:07 PM    Bilirubin, direct 0.4 (H) 11/16/2018 01:50 AM    Bilirubin, total 0.8 12/04/2018 03:07 PM     Constitutional: He appears well-developed. Eyes: EOM are normal. Pupils are equal, round, and reactive to light. Neck: Neck supple. No thyromegaly present. Cardiovascular: Normal rate, regular rhythm, normal heart sounds and intact distal pulses. No murmur heard. Pulmonary/Chest: Effort normal and breath sounds normal.   Musculoskeletal: He exhibits no edema. No ulcers of the lower extremities. Assessment/Plan:   Diagnoses and all orders for this visit:    1. Encounter for screening  -     AMB POC GLUCOSE BLOOD, BY GLUCOSE MONITORING DEVICE  -     METABOLIC PANEL, BASIC; Future  -     HEMOGLOBIN A1C WITH EAG; Future    2. Skin lesion of chest wall    3.  Controlled type 2 diabetes mellitus without complication, without long-term current use of insulin (HCC)  -     metFORMIN ER (GLUCOPHAGE XR) 500 mg tablet; TAKE 2 TABLETS DAILY WITH BREAKFAST AND THEN 2 TABLETS DAILY WITH DINNER  -     glipiZIDE (GLUCOTROL) 5 mg tablet; TAKE 1 TABLET BY MOUTH EVERY DAY 30 MINUTES BEFORE DINNER    4. Essential hypertension  -     triamterene-hydroCHLOROthiazide (MAXZIDE) 37.5-25 mg per tablet; TAKE ONE TABLET BY MOUTH ONE TIME DAILY  -     metoprolol tartrate (LOPRESSOR) 25 mg tablet; TAKE ONE TABLET BY MOUTH TWICE DAILY FOR HYPERTENSION  -     amLODIPine (NORVASC) 5 mg tablet; TAKE 1 TABLET BY MOUTH EVERY DAY      Diabetes Mellitus type 2, under uncertain control. Blood pressure under Good control. Greater than 50% of this 25 minute visit was spent in face-to-face counseling/coordination of care regarding diabetes management. Follow-up Disposition:  Return in about 3 months (around 5/11/2019) for diabetes. Surendra Olsen DNP, FNP-BC, BC-ADM  Natalya Purcell expressed understanding of this plan.

## 2019-04-30 ENCOUNTER — OFFICE VISIT (OUTPATIENT)
Dept: FAMILY MEDICINE CLINIC | Age: 54
End: 2019-04-30

## 2019-04-30 ENCOUNTER — PATIENT OUTREACH (OUTPATIENT)
Dept: FAMILY MEDICINE CLINIC | Age: 54
End: 2019-04-30

## 2019-04-30 ENCOUNTER — HOSPITAL ENCOUNTER (OUTPATIENT)
Dept: LAB | Age: 54
Discharge: HOME | End: 2019-04-30

## 2019-04-30 VITALS
HEART RATE: 62 BPM | WEIGHT: 221 LBS | SYSTOLIC BLOOD PRESSURE: 121 MMHG | TEMPERATURE: 97.6 F | HEIGHT: 68 IN | BODY MASS INDEX: 33.49 KG/M2 | DIASTOLIC BLOOD PRESSURE: 77 MMHG

## 2019-04-30 DIAGNOSIS — E11.9 CONTROLLED TYPE 2 DIABETES MELLITUS WITHOUT COMPLICATION, WITHOUT LONG-TERM CURRENT USE OF INSULIN (HCC): ICD-10-CM

## 2019-04-30 DIAGNOSIS — E11.9 CONTROLLED TYPE 2 DIABETES MELLITUS WITHOUT COMPLICATION, WITHOUT LONG-TERM CURRENT USE OF INSULIN (HCC): Primary | ICD-10-CM

## 2019-04-30 LAB
CHOLEST SERPL-MCNC: 189 MG/DL
CREAT UR-MCNC: 165 MG/DL
EST. AVERAGE GLUCOSE BLD GHB EST-MCNC: 128 MG/DL
GLUCOSE POC: NORMAL MG/DL
HBA1C MFR BLD: 6.1 % (ref 4.2–6.3)
HDLC SERPL-MCNC: 53 MG/DL
HDLC SERPL: 3.6 {RATIO} (ref 0–5)
LDLC SERPL CALC-MCNC: 114.6 MG/DL (ref 0–100)
LIPID PROFILE,FLP: ABNORMAL
MICROALBUMIN UR-MCNC: 0.78 MG/DL
MICROALBUMIN/CREAT UR-RTO: 5 MG/G (ref 0–30)
TRIGL SERPL-MCNC: 107 MG/DL (ref ?–150)
VLDLC SERPL CALC-MCNC: 21.4 MG/DL

## 2019-04-30 PROCEDURE — 83036 HEMOGLOBIN GLYCOSYLATED A1C: CPT

## 2019-04-30 PROCEDURE — 82043 UR ALBUMIN QUANTITATIVE: CPT

## 2019-04-30 PROCEDURE — 80061 LIPID PANEL: CPT

## 2019-04-30 NOTE — PROGRESS NOTES
Patient has office visit today. Lab as ordered. Diagnoses and all orders for this visit:    1.  Controlled type 2 diabetes mellitus without complication, without long-term current use of insulin (HCC)  -     MICROALBUMIN, UR, RAND W/ MICROALB/CREAT RATIO; Future

## 2019-04-30 NOTE — PROGRESS NOTES
Coordination of Care  1. Have you been to the ER, urgent care clinic since your last visit? Hospitalized since your last visit? No    2. Have you seen or consulted any other health care providers outside of the [address] [place] [address] since your last visit? Include any pap smears or colon screening. No    Does the patient need refills?  YES    Learning Assessment Complete? yes     Results for orders placed or performed in visit on 04/30/19   AMB POC GLUCOSE BLOOD, BY GLUCOSE MONITORING DEVICE   Result Value Ref Range    Glucose  fasting mg/dL

## 2019-04-30 NOTE — PROGRESS NOTES
No provider instructions for discharge nurse. Nurse did not see patient today, no AVS given by this nurse.  Naomi Bro RN

## 2019-04-30 NOTE — PROGRESS NOTES
Assessment/Plan:       ICD-10-CM ICD-9-CM    1. Controlled type 2 diabetes mellitus without complication, without long-term current use of insulin (HCC) E11.9 250.00 AMB POC GLUCOSE BLOOD, BY GLUCOSE MONITORING DEVICE      MICROALBUMIN, UR, RAND W/ MICROALB/CREAT RATIO      LIPID PANEL      HEMOGLOBIN A1C WITH EAG      Follow-up and Dispositions    · Return in about 3 months (around 7/30/2019). [id number] [address], [place]  [facility] [place] [address]  Phone: [phone] Fax: [phone]    [facility] [address] [place] [facility] [facility] [address] [place] [address]  Phone: [phone] Fax: [phone]      [facility] [address]  Subjective:     Chief Complaint   Patient presents with    Follow-up     Follow up visit    Medication Refill    Enrico Tamayo is a 47 y.o. PATIENT REFUSED male who speaks [place].  used? no   HPI: gardening is more. ROS:   No increased frequency of urination, no increased thirst; no chest pain, dyspnea or TIA's; no numbness, tingling or pain in extremities; no reports of hypoglycemia. Diabetes   Brought in medications? no   Last took medications: today Taking medications as prescribed? yes    Last ate: yesterday   Physical Activity? Yes lots of time in his garden  Measuring glucoses?  no    Medications:    Current Outpatient Medications:     metFORMIN ER (GLUCOPHAGE XR) 500 mg tablet, TAKE 2 TABLETS DAILY WITH BREAKFAST AND THEN 2 TABLETS DAILY WITH DINNER, Disp: 360 Tab, Rfl: 1    triamterene-hydroCHLOROthiazide (MAXZIDE) 37.5-25 mg per tablet, TAKE ONE TABLET BY MOUTH ONE TIME DAILY, Disp: 90 Tab, Rfl: 1    metoprolol tartrate (LOPRESSOR) 25 mg tablet, TAKE ONE TABLET BY MOUTH TWICE DAILY FOR HYPERTENSION, Disp: 180 Tab, Rfl: 1    glipiZIDE (GLUCOTROL) 5 mg tablet, TAKE 1 TABLET BY MOUTH EVERY DAY 30 MINUTES BEFORE DINNER, Disp: 90 Tab, Rfl: 1    amLODIPine (NORVASC) 5 mg tablet, TAKE 1 TABLET BY MOUTH EVERY DAY, Disp: 90 Tab, Rfl: 1    lactobacillus acidoph-pectin (ACIDOPHILUS-PECTIN) 75 million cell -100 mg cap capsule, Take 1 Cap by mouth daily. (probiotic), Disp: 7 Cap, Rfl: 0    aspirin delayed-release 81 mg tablet, Take 81 mg by mouth daily (with dinner). , Disp: , Rfl:   Social History: He reports that he has never smoked. He has never used smokeless tobacco. He reports that he does not drink alcohol or use drugs. Family History: His Family history is unknown by patient. Objective:     Vitals:    04/30/19 1413   BP: 121/77   Pulse: 62   Temp: 97.6 °F (36.4 °C)   TempSrc: Oral   Weight: 221 lb (100.2 kg)   Height: 5' 8.11\" (1.73 m)    No LMP for male patient. Results for orders placed or performed in visit on 04/30/19   AMB POC GLUCOSE BLOOD, BY GLUCOSE MONITORING DEVICE   Result Value Ref Range    Glucose  fasting mg/dL      Wt Readings from Last 2 Encounters:   04/30/19 221 lb (100.2 kg)   02/11/19 224 lb (101.6 kg)    Weight decreased; Hemoglobin A1c   Date Value Ref Range Status   02/11/2019 6.3 4.2 - 6.3 % Final   11/18/2018 7.5 (H) 4.2 - 6.3 % Final    A1C decreased;   Lab Results   Component Value Date/Time    Microalbumin/Creat ratio (mg/g creat) 5 04/10/2018 01:42 PM    Microalbumin,urine random 0.65 04/10/2018 01:42 PM    Creatinine 1.21 02/11/2019 02:25 PM      Lab Results   Component Value Date/Time    GFR est AA >60 02/11/2019 02:25 PM    GFR est non-AA >60 02/11/2019 02:25 PM       Lab Results   Component Value Date/Time    Cholesterol, total 199 04/10/2018 01:42 PM    HDL Cholesterol 50 04/10/2018 01:42 PM    LDL, calculated 120.6 (H) 04/10/2018 01:42 PM    Triglyceride 142 04/10/2018 01:42 PM    CHOL/HDL Ratio 4.0 04/10/2018 01:42 PM      Lab Results   Component Value Date/Time    ALT (SGPT) 70 12/04/2018 03:07 PM    AST (SGOT) 42 (H) 12/04/2018 03:07 PM    Alk.  phosphatase 141 (H) 12/04/2018 03:07 PM    Bilirubin, direct 0.4 (H) 11/16/2018 01:50 AM    Bilirubin, total 0.8 12/04/2018 03:07 PM     Constitutional: He appears well-developed. Eyes: EOM are normal. Pupils are equal, round, and reactive to light. Neck: Neck supple. No thyromegaly present. Cardiovascular: Normal rate, regular rhythm, normal heart sounds and intact distal pulses. No murmur heard. Pulmonary/Chest: Effort normal and breath sounds normal.   Musculoskeletal: He exhibits no edema. No ulcers of the lower extremities. Assessment/Plan:   Diagnoses and all orders for this visit:    1. Controlled type 2 diabetes mellitus without complication, without long-term current use of insulin (HCC)  -     AMB POC GLUCOSE BLOOD, BY GLUCOSE MONITORING DEVICE  -     MICROALBUMIN, UR, RAND W/ MICROALB/CREAT RATIO; Future  -     LIPID PANEL; Future  -     HEMOGLOBIN A1C WITH EAG; Future      Diabetes Mellitus type 2, under Good control. Blood pressure under Good control. Greater than 50% of this 25 minute visit was spent in face-to-face counseling/coordination of care regarding diabetes management. Follow-up and Dispositions    · Return in about 3 months (around 7/30/2019). Louie Urban DNP, FNP-BC, BC-ADM  Tony Dacosta expressed understanding of this plan.

## 2019-05-09 NOTE — PROGRESS NOTES
4/30/19 [address] [place] [address] Chronic Disease Management Nurse Navigator Note Diagnosis: Diabetes Mellitus Adherence - PCP: YES Adherence - Medications: compliant with all meds Barriers: None ED visit with reachout: NO If ED visit with reachout, plan: na If ED visit without reachout, details: na 
 
A1c 7.5 to 6.3 in 3 months. Bushra Tom states he is very busy with his garden. He had a weight loss of 2 lbs. DM Neuropathic pain: no 
Vision disturbance: no 
Polyuria:no Polydipsia: no 
24 hour dietary recall:  Breakfast- cheese, cereal.  
                                      Lunch- meat and vegetables Dinner salads and lean meats. Time in discussion: 30 minutes

## 2019-07-29 NOTE — PROGRESS NOTES
Mr. Mohsen Fung,  1965, 47 y.o., # [id number]   Learning Assessment 2014   PRIMARY LEARNER Patient   HIGHEST LEVEL OF EDUCATION - PRIMARY LEARNER  4 YEARS OF COLLEGE   PRIMARY LANGUAGE ENGLISH   LEARNER PREFERENCE PRIMARY DEMONSTRATION   ANSWERED BY patient   RELATIONSHIP SELF     Health Maintenance Due   Topic    Hepatitis C Screening     Pneumococcal 0-64 years (1 of 1 - PPSV23)    FOBT Q 1 YEAR AGE 54-65     FOOT EXAM Q1     Social History: He reports that he has never smoked. He has never used smokeless tobacco. He reports that he does not drink alcohol or use drugs. Family History: His Family history is unknown by patient. Surgical History:  has no past surgical history on file. Medications: has a current medication list which includes the following prescription(s): metformin er, triamterene-hydrochlorothiazide, metoprolol tartrate, glipizide, amlodipine, lactobacillus acidoph-pectin, and aspirin delayed-release.   Wt Readings from Last 2 Encounters:   19 221 lb (100.2 kg)   19 224 lb (101.6 kg)      Hemoglobin A1c   Date Value Ref Range Status   2019 6.1 4.2 - 6.3 % Final   2019 6.3 4.2 - 6.3 % Final   2018 7.5 (H) 4.2 - 6.3 % Final   07/10/2018 6.9 (H) 4.2 - 6.3 % Final   04/10/2018 8.0 (H) 4.2 - 6.3 % Final   01/15/2018 7.5 (H) 4.2 - 6.3 % Final        Lab Results   Component Value Date/Time    Microalbumin/Creat ratio (mg/g creat) 5 2019 02:52 PM    Microalbumin,urine random 0.78 2019 02:52 PM    Creatinine 1.21 2019 02:25 PM      Lab Results   Component Value Date/Time    GFR est AA >60 2019 02:25 PM    GFR est non-AA >60 2019 02:25 PM       Lab Results   Component Value Date/Time    Cholesterol, total 189 2019 02:52 PM    HDL Cholesterol 53 2019 02:52 PM    LDL, calculated 114.6 (H) 2019 02:52 PM    Triglyceride 107 2019 02:52 PM    CHOL/HDL Ratio 3.6 2019 02:52 PM      Lab Results   Component Value Date/Time    ALT (SGPT) 70 12/04/2018 03:07 PM    AST (SGOT) 42 (H) 12/04/2018 03:07 PM    Alk.  phosphatase 141 (H) 12/04/2018 03:07 PM    Bilirubin, direct 0.4 (H) 11/16/2018 01:50 AM    Bilirubin, total 0.8 12/04/2018 03:07 PM

## 2019-07-29 NOTE — PROGRESS NOTES
Assessment/Plan:       ICD-10-CM ICD-9-CM    1. Controlled type 2 diabetes mellitus without complication, without long-term current use of insulin (HCC) E11.9 250.00 AMB POC GLUCOSE BLOOD, BY GLUCOSE MONITORING DEVICE      metFORMIN ER (GLUCOPHAGE XR) 500 mg tablet      glipiZIDE (GLUCOTROL) 5 mg tablet   2. Need for hepatitis C screening test Z11.59 V73.89 HEPATITIS C AB   3. Encounter for FIT (fecal immunochemical test) screening Z12.11 V76.51 OCCULT BLOOD IMMUNOASSAY,DIAGNOSTIC   4. Other hyperlipidemia E78.49 272.4 lovastatin (MEVACOR) 20 mg tablet   5. Essential hypertension I10 401.9 triamterene-hydroCHLOROthiazide (MAXZIDE) 37.5-25 mg per tablet      metoprolol tartrate (LOPRESSOR) 25 mg tablet      amLODIPine (NORVASC) 5 mg tablet      Follow-up and Dispositions    · Return for [address] [place] [address] Oct 25, LK, labs 2 weeks before. Follow up for labs: 3 months A1c, cmp  Follow up appointment: 4 months  Follow up phone call:  Changes made today:    [facility] [place] [facility] [address]  [address] [address]  Phone: [phone] Fax: [phone]    [facility] [address] [address]  Phone: [phone] Fax: [phone]      [facility] [address] [place] [address]  Subjective:     Chief Complaint   Patient presents with    Medication Refill    Vinod Hernandez is a 47 y.o. PATIENT REFUSED male who speaks [place]. HPI: 110 fasting  ROS:   No increased frequency of urination, no increased thirst; no chest pain, dyspnea or TIA's; no numbness, tingling or pain in extremities; no reports of hypoglycemia. Diabetes   Brought in medications? no   Last ate: yesterday   Last took medications: took morning blood pressure medications   Taking medications as prescribed? yes      Working: yes   Physical Activity? Yes, gardening; walking - 30 minutes and a fitness center  Measuring glucoses?  Yes, 90, highest 127    Medications:    Current Outpatient Medications:     lovastatin (MEVACOR) 20 mg tablet, Take 1 Tab by mouth nightly. For cholesterol, Disp: 90 Tab, Rfl: 1    metFORMIN ER (GLUCOPHAGE XR) 500 mg tablet, TAKE 2 TABLETS DAILY WITH BREAKFAST AND THEN 2 TABLETS DAILY WITH DINNER, Disp: 360 Tab, Rfl: 1    triamterene-hydroCHLOROthiazide (MAXZIDE) 37.5-25 mg per tablet, TAKE ONE TABLET BY MOUTH ONE TIME DAILY, Disp: 90 Tab, Rfl: 1    metoprolol tartrate (LOPRESSOR) 25 mg tablet, TAKE ONE TABLET BY MOUTH TWICE DAILY FOR HYPERTENSION, Disp: 180 Tab, Rfl: 1    glipiZIDE (GLUCOTROL) 5 mg tablet, TAKE 1 TABLET BY MOUTH EVERY DAY 30 MINUTES BEFORE DINNER, Disp: 90 Tab, Rfl: 1    amLODIPine (NORVASC) 5 mg tablet, TAKE 1 TABLET BY MOUTH EVERY DAY, Disp: 90 Tab, Rfl: 1    lactobacillus acidoph-pectin (ACIDOPHILUS-PECTIN) 75 million cell -100 mg cap capsule, Take 1 Cap by mouth daily. (probiotic), Disp: 7 Cap, Rfl: 0    aspirin delayed-release 81 mg tablet, Take 81 mg by mouth daily (with dinner). , Disp: , Rfl:   Social History: He reports that he has never smoked. He has never used smokeless tobacco. He reports that he does not drink alcohol or use drugs. Family History: His Family history is unknown by patient. Objective:     Vitals:    07/30/19 1314   BP: 131/88   Pulse: 83   Temp: 97.6 °F (36.4 °C)   TempSrc: Oral   Weight: 221 lb 12.8 oz (100.6 kg)    No LMP for male patient. No results found for any visits on 07/30/19. Wt Readings from Last 2 Encounters:   07/30/19 221 lb 12.8 oz (100.6 kg)   04/30/19 221 lb (100.2 kg)    Weight unchanged;    Hemoglobin A1c   Date Value Ref Range Status   04/30/2019 6.1 4.2 - 6.3 % Final   02/11/2019 6.3 4.2 - 6.3 % Final    A1C decreased;   Lab Results   Component Value Date/Time    Microalbumin/Creat ratio (mg/g creat) 5 04/30/2019 02:52 PM    Microalbumin,urine random 0.78 04/30/2019 02:52 PM    Creatinine 1.21 02/11/2019 02:25 PM      Lab Results Component Value Date/Time    GFR est AA >60 02/11/2019 02:25 PM    GFR est non-AA >60 02/11/2019 02:25 PM       Lab Results   Component Value Date/Time    Cholesterol, total 189 04/30/2019 02:52 PM    HDL Cholesterol 53 04/30/2019 02:52 PM    LDL, calculated 114.6 (H) 04/30/2019 02:52 PM    Triglyceride 107 04/30/2019 02:52 PM    CHOL/HDL Ratio 3.6 04/30/2019 02:52 PM      Lab Results   Component Value Date/Time    ALT (SGPT) 70 12/04/2018 03:07 PM    AST (SGOT) 42 (H) 12/04/2018 03:07 PM    Alk. phosphatase 141 (H) 12/04/2018 03:07 PM    Bilirubin, direct 0.4 (H) 11/16/2018 01:50 AM    Bilirubin, total 0.8 12/04/2018 03:07 PM     Constitutional: He appears well-developed. Eyes: EOM are normal. Pupils are equal, round, and reactive to light. Neck: Neck supple. No thyromegaly present. Cardiovascular: Normal rate, regular rhythm, normal heart sounds and intact distal pulses. No murmur heard. Pulmonary/Chest: Effort normal and breath sounds normal.   Musculoskeletal: He exhibits no edema. No ulcers of the lower extremities. Assessment/Plan:   Diagnoses and all orders for this visit:    1. Controlled type 2 diabetes mellitus without complication, without long-term current use of insulin (HCC)  -     AMB POC GLUCOSE BLOOD, BY GLUCOSE MONITORING DEVICE  -     metFORMIN ER (GLUCOPHAGE XR) 500 mg tablet; TAKE 2 TABLETS DAILY WITH BREAKFAST AND THEN 2 TABLETS DAILY WITH DINNER  -     glipiZIDE (GLUCOTROL) 5 mg tablet; TAKE 1 TABLET BY MOUTH EVERY DAY 30 MINUTES BEFORE DINNER    2. Need for hepatitis C screening test  -     HEPATITIS C AB; Future    3. Encounter for FIT (fecal immunochemical test) screening  -     OCCULT BLOOD IMMUNOASSAY,DIAGNOSTIC; Future    4. Other hyperlipidemia  -     lovastatin (MEVACOR) 20 mg tablet; Take 1 Tab by mouth nightly. For cholesterol    5.  Essential hypertension  -     triamterene-hydroCHLOROthiazide (MAXZIDE) 37.5-25 mg per tablet; TAKE ONE TABLET BY MOUTH ONE TIME DAILY  - metoprolol tartrate (LOPRESSOR) 25 mg tablet; TAKE ONE TABLET BY MOUTH TWICE DAILY FOR HYPERTENSION  -     amLODIPine (NORVASC) 5 mg tablet; TAKE 1 TABLET BY MOUTH EVERY DAY    Great foot exam  Diabetes Mellitus type 2, under Good control. Blood pressure under Good control. Greater than 50% of this 25 minute visit was spent in face-to-face counseling/coordination of care regarding diabetes management. Follow-up and Dispositions    · Return for [facility] Oct 25, LK, labs 2 weeks before. Criss Aj, KARMA, FNP-BC, BC-ADM  Adrian Busby expressed understanding of this plan.

## 2019-07-30 ENCOUNTER — HOSPITAL ENCOUNTER (OUTPATIENT)
Dept: LAB | Age: 54
Discharge: HOME | End: 2019-07-30

## 2019-07-30 ENCOUNTER — PATIENT OUTREACH (OUTPATIENT)
Dept: FAMILY MEDICINE CLINIC | Age: 54
End: 2019-07-30

## 2019-07-30 ENCOUNTER — OFFICE VISIT (OUTPATIENT)
Dept: FAMILY MEDICINE CLINIC | Age: 54
End: 2019-07-30

## 2019-07-30 VITALS
BODY MASS INDEX: 33.62 KG/M2 | TEMPERATURE: 97.6 F | SYSTOLIC BLOOD PRESSURE: 131 MMHG | WEIGHT: 221.8 LBS | DIASTOLIC BLOOD PRESSURE: 88 MMHG | HEART RATE: 83 BPM

## 2019-07-30 DIAGNOSIS — Z11.59 NEED FOR HEPATITIS C SCREENING TEST: ICD-10-CM

## 2019-07-30 DIAGNOSIS — E78.49 OTHER HYPERLIPIDEMIA: ICD-10-CM

## 2019-07-30 DIAGNOSIS — I10 ESSENTIAL HYPERTENSION: ICD-10-CM

## 2019-07-30 DIAGNOSIS — Z12.11 ENCOUNTER FOR FIT (FECAL IMMUNOCHEMICAL TEST) SCREENING: ICD-10-CM

## 2019-07-30 DIAGNOSIS — E11.9 CONTROLLED TYPE 2 DIABETES MELLITUS WITHOUT COMPLICATION, WITHOUT LONG-TERM CURRENT USE OF INSULIN (HCC): Primary | ICD-10-CM

## 2019-07-30 LAB
HCV AB SERPL QL IA: NONREACTIVE
HCV COMMENT,HCGAC: NORMAL

## 2019-07-30 PROCEDURE — 86803 HEPATITIS C AB TEST: CPT

## 2019-07-30 RX ORDER — METFORMIN HYDROCHLORIDE 500 MG/1
TABLET, EXTENDED RELEASE ORAL
Qty: 360 TAB | Refills: 1 | Status: SHIPPED | OUTPATIENT
Start: 2019-07-30 | End: 2020-04-20 | Stop reason: SDUPTHER

## 2019-07-30 RX ORDER — AMLODIPINE BESYLATE 5 MG/1
TABLET ORAL
Qty: 90 TAB | Refills: 1 | Status: SHIPPED | OUTPATIENT
Start: 2019-07-30 | End: 2019-11-17 | Stop reason: SDUPTHER

## 2019-07-30 RX ORDER — LOVASTATIN 20 MG/1
20 TABLET ORAL
Qty: 90 TAB | Refills: 1 | Status: SHIPPED | OUTPATIENT
Start: 2019-07-30 | End: 2020-04-20

## 2019-07-30 RX ORDER — TRIAMTERENE/HYDROCHLOROTHIAZID 37.5-25 MG
TABLET ORAL
Qty: 90 TAB | Refills: 1 | Status: SHIPPED | OUTPATIENT
Start: 2019-07-30 | End: 2019-11-08 | Stop reason: SDUPTHER

## 2019-07-30 RX ORDER — METOPROLOL TARTRATE 25 MG/1
TABLET, FILM COATED ORAL
Qty: 180 TAB | Refills: 1 | Status: SHIPPED | OUTPATIENT
Start: 2019-07-30 | End: 2020-04-20 | Stop reason: SDUPTHER

## 2019-07-30 RX ORDER — GLIPIZIDE 5 MG/1
TABLET ORAL
Qty: 90 TAB | Refills: 1 | Status: SHIPPED | OUTPATIENT
Start: 2019-07-30 | End: 2020-04-20 | Stop reason: SDUPTHER

## 2019-07-30 NOTE — PROGRESS NOTES
7/30/19  Goals      Knowledge and adherence of medication (ie. action, side effects, missed dose, etc.)      7/30/19  Nohemi Soria will start cholesterol lowering medications and report to NN/PCP. FU in 3 months. [employer]   Chronic Disease Management Nurse Navigator Note    Diagnosis: HTN, Diabetes Mellitus    Adherence - PCP: YES  Adherence - Medications: compliant with all meds  Barriers: None    ED visit with reachout: NO  If ED visit with reachout, plan: na  If ED visit without reachout, details: Na      DM  Neuropathic pain: no  Vision disturbance: no  Polyuria:no  Polydipsia: No  24 hour dietary recall:  Less CHO, more vegetables, more activity.     Time in discussion: 20 minutes

## 2019-07-30 NOTE — PROGRESS NOTES
Coordination of Care  1. Have you been to the ER, urgent care clinic since your last visit? Hospitalized since your last visit? No    2. Have you seen or consulted any other health care providers outside of the [address] [place] [address] since your last visit? Include any pap smears or colon screening. No    Does the patient need refills? YES    Learning Assessment Complete?  yes  Depression Screening complete in the past 12 months? yes       POC GLUCOSE:110 F

## 2019-07-31 NOTE — PROGRESS NOTES
Reviewed AVS, prescription and pharmacy location with patient. AVS printed and given along with goodrx coupon card and printed RX for Amlodipine from provider. FITT test instructions and kit given. Patient aware that provider would like to follow up about today's visit in [facility] Oct 25, LK, labs 2 weeks before. (Instructed patient to make make lab appointment , and that he would receive a phone call from our office to schedule appointment at [facility]) This has been fully explained to the patient, who indicates understanding and agrees with plan. No further questions at this time.  Katie Fuentes RN

## 2019-10-24 ENCOUNTER — TELEPHONE (OUTPATIENT)
Dept: FAMILY MEDICINE CLINIC | Age: 54
End: 2019-10-24

## 2019-10-24 NOTE — TELEPHONE ENCOUNTER
The pt called the main office and asked to confirm that he does have an appt for tomorrow. The pt's appt was not seen in [address] [place] [address] or [place]. The pt was told  No appt. The pt asked to have his address confirmed in his demographics also. They were correct per the pt.  Cris Reynolds RN

## 2019-11-08 DIAGNOSIS — I10 ESSENTIAL HYPERTENSION: ICD-10-CM

## 2019-11-08 RX ORDER — TRIAMTERENE/HYDROCHLOROTHIAZID 37.5-25 MG
TABLET ORAL
Qty: 90 TAB | Refills: 1 | Status: SHIPPED | OUTPATIENT
Start: 2019-11-08 | End: 2020-04-20 | Stop reason: SDUPTHER

## 2019-11-08 NOTE — TELEPHONE ENCOUNTER
Received refill request in [facility] office from the pt's [facility]. Should have enough until Jan 30, 2020. Last refill 07/30/19 #90, 1 refill. LOV-07/30/19. Provider note states to f/u in 4 month's. Routed to provider for review.  Bonifacio Alcocer RN

## 2019-11-14 ENCOUNTER — TELEPHONE (OUTPATIENT)
Dept: FAMILY MEDICINE CLINIC | Age: 54
End: 2019-11-14

## 2019-11-14 NOTE — TELEPHONE ENCOUNTER
Update information with new phone # [phone]. The pt had called the main office and left a message on the CBN line. The call was returned to the pt. He is requesting his phone # be updated in his chart. Also he has a medication he needs refilling of his medication. The pt was told his medication Maxide was refilled for him on 11/08/19. He was very grateful of that but stated he also needed the Norvasc as well. Sending message to the provider if the pt can have a refill on the Norvasc. The pt was told he had an appt at [facility] Oct 25th. It did not look like he ahd made it to this appt. The stated he called the [facility] office and asked about it and was told he did not have an appt. The chart did not have the appt noted in it. The providers note however stated the pt was to return on Oct 25th at the [facility] with an appt. The pt was apologized to about the mix up and was told the registrar would call him tomorrow and schedule him a new appt. The pt was satisfied and verbalized understanding.  Jaida Villanueva RN

## 2019-11-15 NOTE — TELEPHONE ENCOUNTER
The provider was routed the front offices question regarding if we can OB the pt. Can we OB for an appt at [facility] since the pt had an appt 10/25/19 and was told when he called the office he didn't have an appt, so he did not go?  Cris Reynolds RN

## 2019-11-17 DIAGNOSIS — I10 ESSENTIAL HYPERTENSION: ICD-10-CM

## 2019-11-17 RX ORDER — AMLODIPINE BESYLATE 5 MG/1
TABLET ORAL
Qty: 90 TAB | Refills: 1 | Status: SHIPPED | OUTPATIENT
Start: 2019-11-17 | End: 2020-04-20 | Stop reason: SDUPTHER

## 2020-02-11 NOTE — TELEPHONE ENCOUNTER
Tc from the pt. The he is requesting refill on the medication Triamterene-HCTZ 37.5mg-25mg. The [facility] office received the refill request from the [facility]. The pt's Pharmacy listed in his chart was [facility] in [employer]. The pt was called. He was told his Maxzide he should have had enough for 6 month's. He had #90, 1 refill. He should have enough until May. The pt was told his medication was at the the [facility]. The pt stated he would call the [facility] and have his rx transferred to the [facility].  Alfonzo Crawford RN

## 2020-02-12 ENCOUNTER — TELEPHONE (OUTPATIENT)
Dept: FAMILY MEDICINE CLINIC | Age: 55
End: 2020-02-12

## 2020-04-17 NOTE — TELEPHONE ENCOUNTER
Tc to the pt. The pt had called the CBN line yesterday and left a message  for med refill and an appt. The pt was spoken to and asked if he was out of medication. The pt stated no he was not out of medication yet. The pt said he had planned on coming to the [facility] clinic at the end of the month. He figured the [facility] was closed so he was calling to see if he could get refills since he could probably not be seen for a while. The pt was asked what medications he was out of. He did not know the names. The same day appt line # and protocols were given to the pt. The pt stated he was calling on Monday 6am for an appt. The pt was told if he did not get in Monday just keep trying the next day. The pt agreed. He was asked if he was having any new health issues or symptoms and he stated no.  Almita Castellanos RN

## 2020-04-20 ENCOUNTER — TELEPHONE (OUTPATIENT)
Dept: FAMILY MEDICINE CLINIC | Age: 55
End: 2020-04-20

## 2020-04-20 ENCOUNTER — OFFICE VISIT (OUTPATIENT)
Dept: FAMILY MEDICINE CLINIC | Age: 55
End: 2020-04-20

## 2020-04-20 DIAGNOSIS — E11.9 CONTROLLED TYPE 2 DIABETES MELLITUS WITHOUT COMPLICATION, WITHOUT LONG-TERM CURRENT USE OF INSULIN (HCC): ICD-10-CM

## 2020-04-20 DIAGNOSIS — I10 ESSENTIAL HYPERTENSION: ICD-10-CM

## 2020-04-20 RX ORDER — METFORMIN HYDROCHLORIDE 500 MG/1
TABLET, EXTENDED RELEASE ORAL
Qty: 360 TAB | Refills: 1 | Status: SHIPPED | OUTPATIENT
Start: 2020-04-20 | End: 2020-10-28 | Stop reason: SDUPTHER

## 2020-04-20 RX ORDER — ATORVASTATIN CALCIUM 20 MG/1
20 TABLET, FILM COATED ORAL DAILY
Qty: 90 TAB | Refills: 2 | Status: SHIPPED | OUTPATIENT
Start: 2020-04-20 | End: 2020-10-28 | Stop reason: SDUPTHER

## 2020-04-20 RX ORDER — AMLODIPINE BESYLATE 5 MG/1
TABLET ORAL
Qty: 90 TAB | Refills: 1 | Status: SHIPPED | OUTPATIENT
Start: 2020-04-20 | End: 2020-10-28 | Stop reason: SDUPTHER

## 2020-04-20 RX ORDER — METOPROLOL TARTRATE 25 MG/1
TABLET, FILM COATED ORAL
Qty: 180 TAB | Refills: 1 | Status: SHIPPED | OUTPATIENT
Start: 2020-04-20 | End: 2020-10-28 | Stop reason: SDUPTHER

## 2020-04-20 RX ORDER — GLIPIZIDE 5 MG/1
TABLET ORAL
Qty: 90 TAB | Refills: 1 | Status: SHIPPED | OUTPATIENT
Start: 2020-04-20 | End: 2020-10-28 | Stop reason: SDUPTHER

## 2020-04-20 RX ORDER — TRIAMTERENE/HYDROCHLOROTHIAZID 37.5-25 MG
TABLET ORAL
Qty: 90 TAB | Refills: 1 | Status: SHIPPED | OUTPATIENT
Start: 2020-04-20 | End: 2020-10-28 | Stop reason: SDUPTHER

## 2020-04-20 NOTE — PROGRESS NOTES
HISTORY OF PRESENT ILLNESS  David Mtz is a 54 y.o. male. HPI  Patient agrees to telemedicine. Communication done via phone. Patient is in need of medication refills. He exercise, he eats well. He only needs refill on his medications. His sugar levels are in the low 100s and blood pressure stays 120/70  ROS    Physical Exam    ASSESSMENT and PLAN  Diagnoses and all orders for this visit:    1. Essential hypertension  -     triamterene-hydroCHLOROthiazide (MAXZIDE) 37.5-25 mg per tablet; TAKE ONE TABLET BY MOUTH ONE TIME DAILY  -     amLODIPine (NORVASC) 5 mg tablet; TAKE 1 TABLET BY MOUTH EVERY DAY  -     metoprolol tartrate (LOPRESSOR) 25 mg tablet; TAKE ONE TABLET BY MOUTH TWICE DAILY FOR HYPERTENSION    2. Controlled type 2 diabetes mellitus without complication, without long-term current use of insulin (HCC)  -     glipiZIDE (GLUCOTROL) 5 mg tablet; TAKE 1 TABLET BY MOUTH EVERY DAY 30 MINUTES BEFORE DINNER  -     metFORMIN ER (GLUCOPHAGE XR) 500 mg tablet; TAKE 2 TABLETS DAILY WITH BREAKFAST AND THEN 2 TABLETS DAILY WITH DINNER  -     atorvastatin (LIPITOR) 20 mg tablet; Take 1 Tab by mouth daily. medications refilled.   We will assign for a follow up visit in June 2020

## 2020-04-20 NOTE — PROGRESS NOTES
Avs discussed with David Mtz by Discharge Nurse Sandie Moore LPN. Discussed medications prescribed today, good rx coupon sent to pt via text msg. Patient verbalized understanding and has no further questions.  Sandie Moore LPN

## 2020-07-24 NOTE — TELEPHONE ENCOUNTER
Received a refill request from the pt's [facility] requesting refills on the medication Amlodipine 5 mg, 1 tablet, by mouth, every day. The Rx stated the provider was Ketty Ornelas DNP being the provider that last ordered the Rx 04/27/19 for #90. The chart was reviewed the pt had an OV on 04/20/20 with Dr Connie Gutierrse. Brina Waite and he had refilled the medication Amlodipine 5 mg tablets at that appt for #90, with 1 refill. The Pharmacy was called and the error was corrected and they stated they would fill the pt's medication for him.  The pt should have enough medication to last until mid Oct. Agata Cerrato RN

## 2020-10-28 ENCOUNTER — VIRTUAL VISIT (OUTPATIENT)
Dept: FAMILY MEDICINE CLINIC | Age: 55
End: 2020-10-28

## 2020-10-28 ENCOUNTER — TELEPHONE (OUTPATIENT)
Dept: FAMILY MEDICINE CLINIC | Age: 55
End: 2020-10-28

## 2020-10-28 DIAGNOSIS — I10 ESSENTIAL HYPERTENSION: ICD-10-CM

## 2020-10-28 DIAGNOSIS — E11.9 CONTROLLED TYPE 2 DIABETES MELLITUS WITHOUT COMPLICATION, WITHOUT LONG-TERM CURRENT USE OF INSULIN (HCC): ICD-10-CM

## 2020-10-28 PROCEDURE — 99213 OFFICE O/P EST LOW 20 MIN: CPT | Performed by: FAMILY MEDICINE

## 2020-10-28 RX ORDER — AMLODIPINE BESYLATE 5 MG/1
TABLET ORAL
Qty: 90 TAB | Refills: 3 | Status: SHIPPED | OUTPATIENT
Start: 2020-10-28 | End: 2021-10-27 | Stop reason: SDUPTHER

## 2020-10-28 RX ORDER — GLIPIZIDE 5 MG/1
TABLET ORAL
Qty: 90 TAB | Refills: 3 | Status: SHIPPED | OUTPATIENT
Start: 2020-10-28 | End: 2021-10-27 | Stop reason: SDUPTHER

## 2020-10-28 RX ORDER — ATORVASTATIN CALCIUM 20 MG/1
20 TABLET, FILM COATED ORAL DAILY
Qty: 90 TAB | Refills: 3 | Status: SHIPPED | OUTPATIENT
Start: 2020-10-28 | End: 2021-10-27 | Stop reason: SDUPTHER

## 2020-10-28 RX ORDER — TRIAMTERENE/HYDROCHLOROTHIAZID 37.5-25 MG
TABLET ORAL
Qty: 90 TAB | Refills: 3 | Status: SHIPPED | OUTPATIENT
Start: 2020-10-28 | End: 2021-10-27 | Stop reason: SDUPTHER

## 2020-10-28 RX ORDER — METFORMIN HYDROCHLORIDE 500 MG/1
TABLET, EXTENDED RELEASE ORAL
Qty: 360 TAB | Refills: 3 | Status: SHIPPED | OUTPATIENT
Start: 2020-10-28 | End: 2021-10-27 | Stop reason: SDUPTHER

## 2020-10-28 RX ORDER — METOPROLOL TARTRATE 25 MG/1
TABLET, FILM COATED ORAL
Qty: 180 TAB | Refills: 3 | Status: SHIPPED | OUTPATIENT
Start: 2020-10-28 | End: 2021-10-27 | Stop reason: SDUPTHER

## 2020-10-28 NOTE — TELEPHONE ENCOUNTER
I called patient x2 to offer and schedule a F2F appointment as indicated by Dr. Arielle Hernandez. No answer, but I did leave a voicemail message.

## 2020-10-28 NOTE — PROGRESS NOTES
HISTORY OF PRESENT ILLNESS  Oliver Lorenz is a 54 y.o. male. HPI  I was at home while conducting this encounter. Consent:  He and/or his healthcare decision maker is aware that this patient-initiated Telehealth encounter is a billable service, with coverage as determined by his insurance carrier. He is aware that he may receive a bill and has provided verbal consent to proceed: Yes    This virtual visit was conducted telephone encounter only. -  I affirm this is a Patient Initiated Episode with an Established Patient who has not had a related appointment within my department in the past 7 days or scheduled within the next 24 hours. Note: this encounter is not billable if this call serves to triage the patient into an appointment for the relevant concern. Total Time: minutes: 5-10 minutes. Patient states he is doing well. Patient has been checking his sugar levels and is running in the low 100's. Denies chest pains, shortness of breath or any other problems  Patient was thinking about taking digestive enzymes. He is still taking a aspirin 81 mg a day. He also taking acidophilus. ROS    Physical Exam    ASSESSMENT and PLAN  Diagnoses and all orders for this visit:    1. Essential hypertension  -     metoprolol tartrate (LOPRESSOR) 25 mg tablet; TAKE ONE TABLET BY MOUTH TWICE DAILY FOR HYPERTENSION  -     triamterene-hydroCHLOROthiazide (MAXZIDE) 37.5-25 mg per tablet; TAKE ONE TABLET BY MOUTH ONE TIME DAILY  -     amLODIPine (NORVASC) 5 mg tablet; TAKE 1 TABLET BY MOUTH EVERY DAY    2. Controlled type 2 diabetes mellitus without complication, without long-term current use of insulin (HCC)  -     metFORMIN ER (GLUCOPHAGE XR) 500 mg tablet; TAKE 2 TABLETS DAILY WITH BREAKFAST AND THEN 2 TABLETS DAILY WITH DINNER  -     atorvastatin (LIPITOR) 20 mg tablet;  Take 1 Tab by mouth daily.  -     glipiZIDE (GLUCOTROL) 5 mg tablet; TAKE 1 TABLET BY MOUTH EVERY DAY 30 MINUTES BEFORE DINNER      Medications refilled  We will plan to meet for a face to face visit in about 2 weeks

## 2020-10-28 NOTE — PROGRESS NOTES
Patient states he is at home and patient does have access to vital sign equipment. Jayesh Awad CMA    Coordination of Care  1. Have you been to the ER, urgent care clinic since your last visit? Hospitalized since your last visit? No    2. Have you seen or consulted any other health care providers outside of the [address] [place] [address] since your last visit? Include any pap smears or colon screening. No    Does the patient need refills? YES    Learning Assessment Complete?  yes  Depression Screening complete in the past 12 months? yes

## 2020-12-03 ENCOUNTER — TELEPHONE (OUTPATIENT)
Dept: FAMILY MEDICINE CLINIC | Age: 55
End: 2020-12-03

## 2020-12-03 NOTE — TELEPHONE ENCOUNTER
The pt chart was reviewed the pt does not have an appt on 12/08/20, he has an appt on 12/15/20. Attempted to contact the pt back to confirm he was canceling the appt on12/15/20, not Tues 12/08/20.  Jodie Nj RN

## 2020-12-03 NOTE — TELEPHONE ENCOUNTER
The pt called the CBN line to cancel his upcoming appt on Dec 8th Tues. He has had a death in his family and he will have to travel out of town. A message was sent to the front office regarding canceling the appt.  Dorita Jeffers RN

## 2020-12-14 ENCOUNTER — TELEPHONE (OUTPATIENT)
Dept: FAMILY MEDICINE CLINIC | Age: 55
End: 2020-12-14

## 2020-12-14 DIAGNOSIS — I10 ESSENTIAL HYPERTENSION: ICD-10-CM

## 2020-12-14 DIAGNOSIS — E11.9 CONTROLLED TYPE 2 DIABETES MELLITUS WITHOUT COMPLICATION, WITHOUT LONG-TERM CURRENT USE OF INSULIN (HCC): Primary | ICD-10-CM

## 2021-01-13 ENCOUNTER — TELEPHONE (OUTPATIENT)
Dept: FAMILY MEDICINE CLINIC | Age: 56
End: 2021-01-13

## 2021-01-13 NOTE — TELEPHONE ENCOUNTER
Refill request received in the office from the [address] for refill of the pt's Triamterene/ HCTZ medication. The pt has #90, 3 refills ordered 10/2020. No refills are needed at this time. His Rx were sent to [facility] in Oct. The pt was called to let him know if he wanted refills to [facility] to [facility] he can have his rx's transferred by calling the [address] and letting them know to contact [facility] and they will transfer his Rx's. He has refills at the [facility] to last until Oct 2021. No on answered. A message was left for him to contact the [facility] office.  Luann Phan RN

## 2021-03-29 ENCOUNTER — TELEPHONE (OUTPATIENT)
Dept: FAMILY MEDICINE CLINIC | Age: 56
End: 2021-03-29

## 2021-03-29 NOTE — TELEPHONE ENCOUNTER
Telephone message. the pt he had called and left a message on the N phone. The pt identified himself on the message by name and . He stated he had been texted a message that he has an appt tomorrow that he was totally unaware that he had an appt. He will not be able to come to the appt tomorrow and has called to reschedule it. The message was routed to the [facility] registrar to call the pt and reschedule his appt.  Chau Roberto RN

## 2021-04-27 NOTE — PERIOP NOTES
Chief Complaint   Patient presents with   • Hospital Follow-up       Subjective:     HPI:     Graciela Desai is a 69 y.o. female here to discuss the evaluation and management of:    Patient of Dr. Tuttle    Patient presents today with her significant other.    Patient was recently in the hospital for chest, back pain, weakness, GONZALEZ. She was anemic-had transfusion of PRBC. Has a history of longstanding abnormal uterine bleeding. She was found to have a uterine mass. She underwent an abdominal hysterectomy, SBO, omentectomy, peritoneal biopsies. 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 Requesting records from [facility]-Dr. Quinteros.   Significant other reports that she did have her Beverly catheter removed yesterday from [facility]. Currently has home health nursing, speech, physical and occupational therapy.    MRI pelvis       1. Stage IV endometrial cancer. The endometrial mass itself measures 13 cm, invading through the anterior uterine wall and uterine fundus.   2. Metastatic parametrial, external iliac and para-aortic lymphadenopathy.   3. Multiple pelvic osseous metastases.   4. Several uterine fibroids.           Left MCA infarct  Status post surgery patient did suffer from a left MCA stroke. She does have expressive aphasia. Patient is able to answer primarily with yes or no questions. She denies any difficulties with swallowing. Has home therapies.    CT head with and without March 30, 2021   Acute/subacute left MCA infarct.  No large vessel occlusion or hemodynamically significant stenosis.  Small lytic lesion in the left frontal calvaria concerning for malignancy.      MRI brain with and without March 31, 2021  1.  Moderate-sized left middle cerebral artery acute infarct with possible early hemorrhagic transformation.  2.  Scattered punctate and small acute infarcts involving the bilateral parieto-occipital regions and left cerebellar hemisphere.  3.  There is a  PACU IN REPORT FROM ANESTHESIA Verbal report received from   [facility] [address]  following General for Procedure(s) (LRB): ENDOSCOPIC RETROGRADE CHOLANGIOPANCREATOGRAPHY (ERCP) (N/A) by  Adri Wayne MD. 
 
Note the anesthesia record for medications given intraoperatively. Brief Initial Visual Assessment: 
 
Patient Age: [] Infant(1-12mo)      []Pediatric(1-13yrs)    [] Adolescent(13-18yrs) [x] Adult(18-65yrs)      []Geriatric Adult(>65yrs). Patient    [] Alert           []Calm & Cooperative      [] Anxious Appearance: [x] Drowsy      [x] Sedated                          [] Unresponsive Oriented x 0 Airway:     [x] Patent   
                      [] Near-obstructed   [] Obstructed 
                      []Improved with head/airway repositioning Airway Adjuncts Present: [] Oral Airway    [] Nasal Trumpet      
  [] ETT Respiratory  [x] Even              [] Labored      [] Shallow Pattern:    [x] Non-Labored  [] VENT and/or respiratory assistance 
   being provided. Skin:     [x] Pink [] Pale [x] Warm [] Cool [] Cold [x]Dry [] Moist [] Diaphoretic Membranes:  [x] Pink [] Pale    
  [x] Moist [] Dry [] Crusty Pain:   [x] No Acute Discomfort. 0 /10 Scale [] Verbal Numeric 
 [] Moderate Discomfort.      [] V.A.S. [] Acute Discomfort. [x] A.N.V. 
  [] Chronic-Issue Related Discomfort.   [] F.L.A.C.C. Note E-MAR for medications administered. []Faces, Munoz/Jimmie Note assessments documented in flowsheets; any assessment variants to be found in comments or narrative perioperative nurse notes.     
 
Post-anesthesia care now assumed by [facility] BSN, RN-BC   
 
 pattern of mild supratentorial white matter disease with scattered foci of bright T2 and FLAIR signal in the subcortical and deep white matter of both hemispheres consistent with small vessel ischemic change versus demyelination or   gliosis. Mild diffuse cerebral substance loss.  4.  Mild microangiopathic ischemic change versus demyelination or gliosis.       Also noted in the left frontal calvarium is an area of enhancement in the diploic space measuring approximately 11 mm which corresponds to the location concerning for lytic lesion on the recent CT of the head. Finding is concerning for metastatic osseous disease.    Ureteral stents in place  Patient still has her ureteral stents in place. Have informed the significant other and the patient that she will need to follow-up with urology for this. States that he does have an upcoming appointment already however may need referral.       ROS:  Denies any Headache, Blurred Vision, Confusion, Chest pain,  Shortness of breath,  Abdominal pain, Changes of bowel or bladder, Lower ext edema, Fevers, Nights sweats, Weight Changes, Focal weakness or numbness.  And all other systems reviewed and are all negative. POSITIVE FOR see above        Current Outpatient Medications:   •  amLODIPine (NORVASC) 5 MG Tab, Take 1 tablet by mouth every day for 30 days., Disp: 30 tablet, Rfl: 0  •  aspirin 81 MG EC tablet, Take 1 tablet by mouth every day for 30 days., Disp: 30 tablet, Rfl: 0  •  atorvastatin (LIPITOR) 40 MG Tab, Take 1 tablet by mouth every day for 30 days., Disp: 30 tablet, Rfl: 0  •  oxyCODONE-acetaminophen (PERCOCET-10)  MG Tab, Take 1 tablet by mouth every 6 hours as needed for Severe Pain for up to 14 days., Disp: 56 tablet, Rfl: 0  •  oxyCODONE immediate-release (ROXICODONE) 5 MG Tab, , Disp: , Rfl:     No Known Allergies    Past Medical History:   Diagnosis Date   • Hypertension      Past Surgical History:   Procedure Laterality Date   • PB TOTAL ABDOM  HYSTERECTOMY N/A 3/29/2021    Procedure: HYSTERECTOMY, TOTAL, ABDOMINAL-;  Surgeon: Shonda Quinteros M.D.;  Location: SURGERY [facility];  Service: Gynecology Oncology   • PB REMOVAL OF OMENTUM N/A 3/29/2021    Procedure: OMENTECTOMY;  Surgeon: Shonda Quinteros M.D.;  Location: SURGERY [facility];  Service: Gynecology Oncology   • SALPINGO OOPHORECTOMY Bilateral 3/29/2021    Procedure: SALPINGO-OOPHORECTOMY;  Surgeon: Shonda Quinteros M.D.;  Location: SURGERY [facility];  Service: Gynecology Oncology   • NODE DISSECTION Bilateral 3/29/2021    Procedure: Paraaortic and pelvic Lymph Node dissection;  Surgeon: Shonda Quinteros M.D.;  Location: SURGERY [facility];  Service: Gynecology Oncology   • STENT PLACEMENT Bilateral 3/29/2021    Procedure: Bilateral Ureterolysis, Cystotomy Repair,  Bilateral Ureteral Stent placement;  Surgeon: Shonda Quinteros M.D.;  Location: [facility];  Service: Gynecology Oncology   • APPENDECTOMY     • PRIMARY C SECTION       Family History   Problem Relation Age of Onset   • Alcohol/Drug Mother    • Heart Disease Mother    • No Known Problems Father      Social History     Socioeconomic History   • Marital status:      Spouse name: Not on file   • Number of children: Not on file   • Years of education: Not on file   • Highest education level: Not on file   Occupational History   • Not on file   Tobacco Use   • Smoking status: Former Smoker     Packs/day: 2.00     Years: 1.00     Pack years: 2.00     Types: Cigarettes     Start date: 1996     Quit date: 1997     Years since quittin.3   • Smokeless tobacco: Never Used   Substance and Sexual Activity   • Alcohol use: No   • Drug use: No   • Sexual activity: Not on file   Other Topics Concern   • Not on file   Social History Narrative   • Not on file     Social Determinants of Health     Financial Resource Strain:    • Difficulty of Paying Living Expenses:    Food Insecurity:    • Worried About Running Out  "of Food in the Last Year:    • Ran Out of Food in the Last Year:    Transportation Needs:    • Lack of Transportation (Medical):    • Lack of Transportation (Non-Medical):    Physical Activity:    • Days of Exercise per Week:    • Minutes of Exercise per Session:    Stress:    • Feeling of Stress :    Social Connections:    • Frequency of Communication with Friends and Family:    • Frequency of Social Gatherings with Friends and Family:    • Attends Alevism Services:    • Active Member of Clubs or Organizations:    • Attends Club or Organization Meetings:    • Marital Status:    Intimate Partner Violence:    • Fear of Current or Ex-Partner:    • Emotionally Abused:    • Physically Abused:    • Sexually Abused:        Objective:     Vitals: /74 (BP Location: Left arm)   Pulse 97   Temp 37.1 °C (98.7 °F)   Resp 16   Ht 1.575 m (5' 2\")   Wt 57.2 kg (126 lb)   SpO2 97%   BMI 23.05 kg/m²    General: Alert, expressive aphasia  HEENT: Normocephalic.  Neck supple.  No thyromegaly or masses palpated. No cervical or supraclavicular lymphadenopathy.  Heart: Regular rate and rhythm.  S1 and S2 normal.  No murmurs appreciated.  Respiratory: Normal respiratory effort.  Clear to auscultation bilaterally. No wheezing  Skin: Warm, dry, no rashes.  Extremities: No leg edema. No discoloration  Neurological: No tremors antalgic gait  Psych:  Affect/mood is flat affect, expressive aphasia judgement is good, unable to assess memory grooming is appropriate.    Assessment/Plan:     Graciela was seen today for hospital follow-up.    Diagnoses and all orders for this visit:    Hospital discharge follow-up  Have reviewed hospital notes, diagnostics, labs and consultations with patient and her significant other.    Metastatic endometrial cancer (HCC)  Status post total hysterectomy. Following up with [facility]. Have requested records. Significant other in patient had mentioned they will not be doing chemotherapy.    S/P " ureteral stent placement  Has appointment scheduled for early May to be removed.  -     REFERRAL TO UROLOGY    History of ischemic middle cerebral artery stroke  Expressive aphasia  Status post surgical procedure. Has follow-up with neurology for this.      No follow-ups on file.          Sabrina CAAZRES.

## 2021-10-27 ENCOUNTER — HOSPITAL ENCOUNTER (OUTPATIENT)
Dept: LAB | Age: 56
Discharge: HOME | End: 2021-10-27

## 2021-10-27 ENCOUNTER — OFFICE VISIT (OUTPATIENT)
Dept: FAMILY MEDICINE CLINIC | Age: 56
End: 2021-10-27

## 2021-10-27 VITALS
TEMPERATURE: 98.4 F | DIASTOLIC BLOOD PRESSURE: 88 MMHG | HEART RATE: 71 BPM | SYSTOLIC BLOOD PRESSURE: 147 MMHG | OXYGEN SATURATION: 97 % | RESPIRATION RATE: 16 BRPM | BODY MASS INDEX: 37.62 KG/M2 | WEIGHT: 248.2 LBS | HEIGHT: 68 IN

## 2021-10-27 DIAGNOSIS — Z12.5 SCREENING FOR MALIGNANT NEOPLASM OF PROSTATE: ICD-10-CM

## 2021-10-27 DIAGNOSIS — E11.9 CONTROLLED TYPE 2 DIABETES MELLITUS WITHOUT COMPLICATION, WITHOUT LONG-TERM CURRENT USE OF INSULIN (HCC): ICD-10-CM

## 2021-10-27 DIAGNOSIS — E11.9 CONTROLLED TYPE 2 DIABETES MELLITUS WITHOUT COMPLICATION, WITHOUT LONG-TERM CURRENT USE OF INSULIN (HCC): Primary | ICD-10-CM

## 2021-10-27 DIAGNOSIS — I10 ESSENTIAL HYPERTENSION: ICD-10-CM

## 2021-10-27 DIAGNOSIS — L72.3 SEBACEOUS CYST: ICD-10-CM

## 2021-10-27 DIAGNOSIS — Z23 NEEDS FLU SHOT: ICD-10-CM

## 2021-10-27 LAB — GLUCOSE POC: NORMAL MG/DL

## 2021-10-27 PROCEDURE — 84153 ASSAY OF PSA TOTAL: CPT

## 2021-10-27 PROCEDURE — 90686 IIV4 VACC NO PRSV 0.5 ML IM: CPT

## 2021-10-27 PROCEDURE — 82043 UR ALBUMIN QUANTITATIVE: CPT

## 2021-10-27 PROCEDURE — 80061 LIPID PANEL: CPT

## 2021-10-27 PROCEDURE — 99213 OFFICE O/P EST LOW 20 MIN: CPT | Performed by: FAMILY MEDICINE

## 2021-10-27 PROCEDURE — 83036 HEMOGLOBIN GLYCOSYLATED A1C: CPT

## 2021-10-27 PROCEDURE — 80053 COMPREHEN METABOLIC PANEL: CPT

## 2021-10-27 PROCEDURE — 82962 GLUCOSE BLOOD TEST: CPT | Performed by: FAMILY MEDICINE

## 2021-10-27 PROCEDURE — 90471 IMMUNIZATION ADMIN: CPT

## 2021-10-27 RX ORDER — ATORVASTATIN CALCIUM 20 MG/1
20 TABLET, FILM COATED ORAL DAILY
Qty: 90 TABLET | Refills: 3 | Status: SHIPPED | OUTPATIENT
Start: 2021-10-27 | End: 2022-10-28 | Stop reason: SDUPTHER

## 2021-10-27 RX ORDER — ASPIRIN 81 MG/1
81 TABLET ORAL
Status: CANCELLED | OUTPATIENT
Start: 2021-10-27

## 2021-10-27 RX ORDER — AMLODIPINE BESYLATE 5 MG/1
TABLET ORAL
Qty: 90 TABLET | Refills: 3 | Status: SHIPPED | OUTPATIENT
Start: 2021-10-27 | End: 2022-10-28 | Stop reason: SDUPTHER

## 2021-10-27 RX ORDER — TRIAMTERENE/HYDROCHLOROTHIAZID 37.5-25 MG
TABLET ORAL
Qty: 90 TABLET | Refills: 3 | Status: SHIPPED | OUTPATIENT
Start: 2021-10-27 | End: 2022-10-28 | Stop reason: SDUPTHER

## 2021-10-27 RX ORDER — METFORMIN HYDROCHLORIDE 500 MG/1
TABLET, EXTENDED RELEASE ORAL
Qty: 360 TABLET | Refills: 3 | Status: SHIPPED | OUTPATIENT
Start: 2021-10-27 | End: 2022-10-28 | Stop reason: SDUPTHER

## 2021-10-27 RX ORDER — METOPROLOL TARTRATE 25 MG/1
TABLET, FILM COATED ORAL
Qty: 180 TABLET | Refills: 3 | Status: SHIPPED | OUTPATIENT
Start: 2021-10-27 | End: 2022-10-28 | Stop reason: SDUPTHER

## 2021-10-27 RX ORDER — GLIPIZIDE 5 MG/1
TABLET ORAL
Qty: 90 TABLET | Refills: 3 | Status: SHIPPED | OUTPATIENT
Start: 2021-10-27 | End: 2022-10-28 | Stop reason: SDUPTHER

## 2021-10-27 NOTE — PROGRESS NOTES
HISTORY OF PRESENT ILLNESS  Sally Weaver is a 64 y.o. male. HPI  Here for a follow up on his diabetes and hypertension. Has been taking medications as prescribed,  Denies any chest pains, shortness of breath. No vision changes. Has put on a few pounds. Will start doing more physical activity. Ran out of glucometer strips. But prior to this his  Fasting sugar 127-13. Also having a skin lesion on the left side of the chest.  Also thinks he had an abscess in the back    Review of Systems   Constitutional: Negative for chills, fever and weight loss. HENT: Negative for congestion, hearing loss, nosebleeds and sinus pain. Eyes: Negative for blurred vision, double vision and photophobia. Respiratory: Negative for cough, sputum production, shortness of breath, wheezing and stridor. Cardiovascular: Negative for chest pain, palpitations and orthopnea. Gastrointestinal: Negative for abdominal pain, diarrhea, heartburn, nausea and vomiting. Genitourinary: Negative for dysuria, frequency and urgency. Musculoskeletal: Negative for myalgias. Skin:        Lump of skin on chest and upper back   Neurological: Negative for dizziness, tingling, tremors and headaches. BP (!) 147/88 (BP 1 Location: Right upper arm, BP Patient Position: Sitting, BP Cuff Size: Adult long)   Pulse 71   Temp 98.4 °F (36.9 °C) (Temporal)   Resp 16   Ht 5' 8.11\" (1.73 m)   Wt 248 lb 3.2 oz (112.6 kg)   SpO2 97%   BMI 37.62 kg/m²   Physical Exam  Constitutional:       General: He is not in acute distress. HENT:      Head: Normocephalic. Right Ear: Tympanic membrane normal. There is no impacted cerumen. Left Ear: Tympanic membrane normal. There is no impacted cerumen. Nose: Nose normal. No congestion. Mouth/Throat:      Mouth: Mucous membranes are moist.      Pharynx: No oropharyngeal exudate or posterior oropharyngeal erythema. Eyes:      General:         Right eye: No discharge.          Left eye: No discharge. Pupils: Pupils are equal, round, and reactive to light. Cardiovascular:      Rate and Rhythm: Normal rate and regular rhythm. Pulses: Normal pulses. Heart sounds: Normal heart sounds. No murmur heard. Pulmonary:      Effort: Pulmonary effort is normal.      Breath sounds: Normal breath sounds. No wheezing or rhonchi. Abdominal:      General: Bowel sounds are normal. There is no distension. Palpations: Abdomen is soft. Tenderness: There is no abdominal tenderness. Musculoskeletal:         General: No swelling or tenderness. Normal range of motion. Cervical back: Normal range of motion. No rigidity. Skin:     General: Skin is warm. Findings: No erythema or rash. Comments: There is a sebaceous cyst of about 1 cm in the left side of chest and a 2 cm sebaceous cyst, mid upper back   Neurological:      Mental Status: He is alert. ASSESSMENT and PLAN  Diagnoses and all orders for this visit:    1. Controlled type 2 diabetes mellitus without complication, without long-term current use of insulin (HCC)  -     AMB POC GLUCOSE BLOOD, BY GLUCOSE MONITORING DEVICE  -     metFORMIN ER (GLUCOPHAGE XR) 500 mg tablet; TAKE 2 TABLETS DAILY WITH BREAKFAST AND THEN 2 TABLETS DAILY WITH DINNER  -     glipiZIDE (GLUCOTROL) 5 mg tablet; TAKE 1 TABLET BY MOUTH EVERY DAY 30 MINUTES BEFORE DINNER  -     atorvastatin (LIPITOR) 20 mg tablet; Take 1 Tablet by mouth daily.  -     HEMOGLOBIN A1C WITH EAG; Future  -     LIPID PANEL; Future  -     METABOLIC PANEL, COMPREHENSIVE; Future  -     MICROALBUMIN, UR, RAND W/ MICROALB/CREAT RATIO; Future    2.  Essential hypertension  -     metoprolol tartrate (LOPRESSOR) 25 mg tablet; TAKE ONE TABLET BY MOUTH TWICE DAILY FOR HYPERTENSION  -     triamterene-hydroCHLOROthiazide (MAXZIDE) 37.5-25 mg per tablet; TAKE ONE TABLET BY MOUTH ONE TIME DAILY  -     amLODIPine (NORVASC) 5 mg tablet; TAKE 1 TABLET BY MOUTH EVERY DAY    3. Needs flu shot  -     INFLUENZA VIRUS VAC QUAD,SPLIT,PRESV FREE SYRINGE IM    4. Screening for malignant neoplasm of prostate  -     PSA SCREENING (SCREENING); Future    5.  Sebaceous cyst      64year old male with type 2 diabetes and hypertension,  We will refill meds, check labs today  Sebaceous cyst, not causing discomfort at this point  Follow up in 1months  64year old

## 2021-10-27 NOTE — PROGRESS NOTES
Angie Morales  completed screening documentation. Patient asking for the flu vaccine today. No contraindications for administering vaccines listed or stated. Immunization  given per policy. Entered  Into [employer]. Vaccine Immunization Statement(s) given and instructions regarding adverse reaction. Instructed to go to Emergency Dept iIf rash or swelling of face or shortness of breath appeared. Also instructed to wait 10-15 min at site to observe for reaction. No adverse reaction noted at time of discharge. An After Visit Summary was printed and reviewed with the patient. Alfreda Barrett        .

## 2021-10-27 NOTE — PROGRESS NOTES
Hawa Tello is a 64 y.o. male  Chief Complaint   Patient presents with    Physical     medication refills, last A1c done 04/30/21 (6.1)    Mass     patient c/o with lump on the chest L side. Blood pressure (!) 147/88, pulse 71, temperature 98.4 °F (36.9 °C), temperature source Temporal, resp. rate 16, height 5' 8.11\" (1.73 m), weight 248 lb 3.2 oz (112.6 kg), SpO2 97 %. Results for orders placed or performed in visit on 10/27/21   AMB POC GLUCOSE BLOOD, BY GLUCOSE MONITORING DEVICE   Result Value Ref Range    Glucose -no fasting MG/DL     1. Have you been to the ER, urgent care clinic since your last visit? Hospitalized since your last visit? No    2. Have you seen or consulted any other health care providers outside of the [address] [place] [address] since your last visit? Include any pap smears or colon screening.  No

## 2021-10-28 LAB
ALBUMIN SERPL-MCNC: 4.2 G/DL (ref 3.5–5)
ALBUMIN/GLOB SERPL: 1.2 {RATIO} (ref 1.1–2.2)
ALP SERPL-CCNC: 94 U/L (ref 45–117)
ALT SERPL-CCNC: 54 U/L (ref 12–78)
ANION GAP SERPL CALC-SCNC: 3 MMOL/L (ref 5–15)
AST SERPL-CCNC: 22 U/L (ref 15–37)
BILIRUB SERPL-MCNC: 0.6 MG/DL (ref 0.2–1)
BUN SERPL-MCNC: 10 MG/DL (ref 6–20)
BUN/CREAT SERPL: 8 (ref 12–20)
CALCIUM SERPL-MCNC: 9.6 MG/DL (ref 8.5–10.1)
CHLORIDE SERPL-SCNC: 101 MMOL/L (ref 97–108)
CHOLEST SERPL-MCNC: 137 MG/DL
CO2 SERPL-SCNC: 32 MMOL/L (ref 21–32)
CREAT SERPL-MCNC: 1.33 MG/DL (ref 0.7–1.3)
CREAT UR-MCNC: 93.9 MG/DL
EST. AVERAGE GLUCOSE BLD GHB EST-MCNC: 143 MG/DL
GLOBULIN SER CALC-MCNC: 3.4 G/DL (ref 2–4)
GLUCOSE SERPL-MCNC: 143 MG/DL (ref 65–100)
HBA1C MFR BLD: 6.6 % (ref 4–5.6)
HDLC SERPL-MCNC: 49 MG/DL
HDLC SERPL: 2.8 {RATIO} (ref 0–5)
LDLC SERPL CALC-MCNC: 69 MG/DL (ref 0–100)
MICROALBUMIN UR-MCNC: <0.5 MG/DL
MICROALBUMIN/CREAT UR-RTO: <5 MG/G (ref 0–30)
POTASSIUM SERPL-SCNC: 3.9 MMOL/L (ref 3.5–5.1)
PROT SERPL-MCNC: 7.6 G/DL (ref 6.4–8.2)
PSA SERPL-MCNC: 5.5 NG/ML (ref 0.01–4)
SODIUM SERPL-SCNC: 136 MMOL/L (ref 136–145)
TRIGL SERPL-MCNC: 95 MG/DL (ref ?–150)
VLDLC SERPL CALC-MCNC: 19 MG/DL

## 2021-11-01 NOTE — PROGRESS NOTES
Diabetes is well controlled, hemoglobin a1c is 6.6%    Normal cholesterol levels  Prostate test in blood is a little elevated at 5.5, he should have an appointment with me already schedule,  we will get another reading that day and we will discussed next steps in treatment

## 2021-11-02 NOTE — PROGRESS NOTES
Letter created with results and sent to letter queue. It will be printed and mailed to patient. Patient told to call to discuss the results if recommended or if patient has any questions.  Julianne Mcdermott RN

## 2022-01-13 NOTE — PROGRESS NOTES
Coordination of Care  1. Have you been to the ER, urgent care clinic since your last visit? Hospitalized since your last visit? No    2. Have you seen or consulted any other health care providers outside of the [address] [place] [address] since your last visit? Include any pap smears or colon screening. No    Does the patient need refills?  YES    Learning Assessment Complete? yes     Results for orders placed or performed in visit on 04/10/18   AMB POC GLUCOSE BLOOD, BY GLUCOSE MONITORING DEVICE   Result Value Ref Range    Glucose POC nf  131 mg/dL back pain/injury

## 2022-03-18 PROBLEM — E80.6 HYPERBILIRUBINEMIA: Status: ACTIVE | Noted: 2018-11-09

## 2022-03-18 PROBLEM — N17.9 ARF (ACUTE RENAL FAILURE) (HCC): Status: ACTIVE | Noted: 2018-11-08

## 2022-03-18 PROBLEM — K85.10 GALLSTONE PANCREATITIS: Status: ACTIVE | Noted: 2018-11-08

## 2022-03-18 PROBLEM — R79.89 ABNORMAL LFTS: Status: ACTIVE | Noted: 2018-11-08

## 2022-03-19 PROBLEM — E66.01 SEVERE OBESITY (BMI 35.0-39.9) WITH COMORBIDITY (HCC): Status: ACTIVE | Noted: 2018-04-10

## 2022-03-19 PROBLEM — K80.20 CHOLELITHIASIS: Status: ACTIVE | Noted: 2018-11-08

## 2022-03-19 PROBLEM — E87.6 HYPOKALEMIA: Status: ACTIVE | Noted: 2018-11-08

## 2022-03-20 PROBLEM — K85.90 PANCREATITIS: Status: ACTIVE | Noted: 2018-11-08

## 2022-03-20 PROBLEM — E11.21 TYPE 2 DIABETES WITH NEPHROPATHY (HCC): Status: ACTIVE | Noted: 2018-12-04

## 2022-10-28 ENCOUNTER — HOSPITAL ENCOUNTER (OUTPATIENT)
Dept: LAB | Age: 57
Discharge: HOME | End: 2022-10-28

## 2022-10-28 ENCOUNTER — OFFICE VISIT (OUTPATIENT)
Dept: FAMILY MEDICINE CLINIC | Age: 57
End: 2022-10-28

## 2022-10-28 VITALS
BODY MASS INDEX: 37.5 KG/M2 | SYSTOLIC BLOOD PRESSURE: 131 MMHG | HEIGHT: 68 IN | TEMPERATURE: 98.2 F | WEIGHT: 247.4 LBS | HEART RATE: 74 BPM | DIASTOLIC BLOOD PRESSURE: 80 MMHG | OXYGEN SATURATION: 99 %

## 2022-10-28 DIAGNOSIS — I10 ESSENTIAL HYPERTENSION: ICD-10-CM

## 2022-10-28 DIAGNOSIS — Z12.11 SCREENING FOR COLON CANCER: ICD-10-CM

## 2022-10-28 DIAGNOSIS — R97.20 ELEVATED PSA: ICD-10-CM

## 2022-10-28 DIAGNOSIS — E11.9 CONTROLLED TYPE 2 DIABETES MELLITUS WITHOUT COMPLICATION, WITHOUT LONG-TERM CURRENT USE OF INSULIN (HCC): ICD-10-CM

## 2022-10-28 DIAGNOSIS — E11.9 CONTROLLED TYPE 2 DIABETES MELLITUS WITHOUT COMPLICATION, WITHOUT LONG-TERM CURRENT USE OF INSULIN (HCC): Primary | ICD-10-CM

## 2022-10-28 LAB
ALBUMIN SERPL-MCNC: 4.5 G/DL (ref 3.5–5)
ALBUMIN/GLOB SERPL: 1.5 {RATIO} (ref 1.1–2.2)
ALP SERPL-CCNC: 95 U/L (ref 45–117)
ALT SERPL-CCNC: 43 U/L (ref 12–78)
ANION GAP SERPL CALC-SCNC: 6 MMOL/L (ref 5–15)
AST SERPL-CCNC: 22 U/L (ref 15–37)
BILIRUB SERPL-MCNC: 0.5 MG/DL (ref 0.2–1)
BUN SERPL-MCNC: 10 MG/DL (ref 6–20)
BUN/CREAT SERPL: 8 (ref 12–20)
CALCIUM SERPL-MCNC: 9.5 MG/DL (ref 8.5–10.1)
CHLORIDE SERPL-SCNC: 100 MMOL/L (ref 97–108)
CHOLEST SERPL-MCNC: 125 MG/DL
CO2 SERPL-SCNC: 32 MMOL/L (ref 21–32)
CREAT SERPL-MCNC: 1.24 MG/DL (ref 0.7–1.3)
CREAT UR-MCNC: 49.9 MG/DL
ERYTHROCYTE [DISTWIDTH] IN BLOOD BY AUTOMATED COUNT: 13.4 % (ref 11.5–14.5)
EST. AVERAGE GLUCOSE BLD GHB EST-MCNC: 171 MG/DL
GLOBULIN SER CALC-MCNC: 3.1 G/DL (ref 2–4)
GLUCOSE POC: NORMAL MG/DL
GLUCOSE SERPL-MCNC: 121 MG/DL (ref 65–100)
HBA1C MFR BLD: 7.6 % (ref 4–5.6)
HCT VFR BLD AUTO: 45.6 % (ref 36.6–50.3)
HDLC SERPL-MCNC: 57 MG/DL
HDLC SERPL: 2.2 {RATIO} (ref 0–5)
HGB BLD-MCNC: 15.5 G/DL (ref 12.1–17)
LDLC SERPL CALC-MCNC: 50.6 MG/DL (ref 0–100)
MCH RBC QN AUTO: 30.3 PG (ref 26–34)
MCHC RBC AUTO-ENTMCNC: 34 G/DL (ref 30–36.5)
MCV RBC AUTO: 89.2 FL (ref 80–99)
MICROALBUMIN UR-MCNC: <0.5 MG/DL
MICROALBUMIN/CREAT UR-RTO: <10 MG/G (ref 0–30)
NRBC # BLD: 0 K/UL (ref 0–0.01)
NRBC BLD-RTO: 0 PER 100 WBC
PLATELET # BLD AUTO: 364 K/UL (ref 150–400)
PMV BLD AUTO: 9.8 FL (ref 8.9–12.9)
POTASSIUM SERPL-SCNC: 4 MMOL/L (ref 3.5–5.1)
PROT SERPL-MCNC: 7.6 G/DL (ref 6.4–8.2)
RBC # BLD AUTO: 5.11 M/UL (ref 4.1–5.7)
SODIUM SERPL-SCNC: 138 MMOL/L (ref 136–145)
TRIGL SERPL-MCNC: 87 MG/DL (ref ?–150)
VLDLC SERPL CALC-MCNC: 17.4 MG/DL
WBC # BLD AUTO: 7.1 K/UL (ref 4.1–11.1)

## 2022-10-28 PROCEDURE — 3078F DIAST BP <80 MM HG: CPT | Performed by: FAMILY MEDICINE

## 2022-10-28 PROCEDURE — 83036 HEMOGLOBIN GLYCOSYLATED A1C: CPT

## 2022-10-28 PROCEDURE — 36415 COLL VENOUS BLD VENIPUNCTURE: CPT

## 2022-10-28 PROCEDURE — 82043 UR ALBUMIN QUANTITATIVE: CPT

## 2022-10-28 PROCEDURE — 99214 OFFICE O/P EST MOD 30 MIN: CPT | Performed by: FAMILY MEDICINE

## 2022-10-28 PROCEDURE — 84154 ASSAY OF PSA FREE: CPT

## 2022-10-28 PROCEDURE — 80053 COMPREHEN METABOLIC PANEL: CPT

## 2022-10-28 PROCEDURE — 80061 LIPID PANEL: CPT

## 2022-10-28 PROCEDURE — 82962 GLUCOSE BLOOD TEST: CPT | Performed by: FAMILY MEDICINE

## 2022-10-28 PROCEDURE — 3074F SYST BP LT 130 MM HG: CPT | Performed by: FAMILY MEDICINE

## 2022-10-28 PROCEDURE — 85027 COMPLETE CBC AUTOMATED: CPT

## 2022-10-28 PROCEDURE — 84443 ASSAY THYROID STIM HORMONE: CPT

## 2022-10-28 RX ORDER — METFORMIN HYDROCHLORIDE 500 MG/1
TABLET, EXTENDED RELEASE ORAL
Qty: 360 TABLET | Refills: 3 | Status: SHIPPED | OUTPATIENT
Start: 2022-10-28

## 2022-10-28 RX ORDER — AMLODIPINE BESYLATE 5 MG/1
TABLET ORAL
Qty: 90 TABLET | Refills: 3 | Status: SHIPPED | OUTPATIENT
Start: 2022-10-28

## 2022-10-28 RX ORDER — GLIPIZIDE 5 MG/1
TABLET ORAL
Qty: 90 TABLET | Refills: 3 | Status: SHIPPED | OUTPATIENT
Start: 2022-10-28

## 2022-10-28 RX ORDER — METOPROLOL TARTRATE 25 MG/1
TABLET, FILM COATED ORAL
Qty: 180 TABLET | Refills: 3 | Status: SHIPPED | OUTPATIENT
Start: 2022-10-28

## 2022-10-28 RX ORDER — ATORVASTATIN CALCIUM 20 MG/1
20 TABLET, FILM COATED ORAL DAILY
Qty: 90 TABLET | Refills: 3 | Status: SHIPPED | OUTPATIENT
Start: 2022-10-28

## 2022-10-28 RX ORDER — TRIAMTERENE/HYDROCHLOROTHIAZID 37.5-25 MG
TABLET ORAL
Qty: 90 TABLET | Refills: 3 | Status: SHIPPED | OUTPATIENT
Start: 2022-10-28

## 2022-10-28 NOTE — PROGRESS NOTES
Coordination of Care  1. Have you been to the ER, urgent care clinic since your last visit? Hospitalized since your last visit? No    2. Have you seen or consulted any other health care providers outside of the [address] [place] [address] since your last visit? Include any pap smears or colon screening. No    Does the patient need refills? YES    Learning Assessment Complete?  yes  Depression Screening complete in the past 12 months? yes  Results for orders placed or performed in visit on 10/28/22   AMB POC GLUCOSE BLOOD, BY GLUCOSE MONITORING DEVICE   Result Value Ref Range    Glucose POC nf 158 MG/DL

## 2022-10-28 NOTE — PROGRESS NOTES
Linette Torrez (: 1965) is a 62 y.o. male, established patient, here for evaluation of the following chief complaint(s):  Diabetes (Medication refill/)       ASSESSMENT/PLAN:  1. Controlled type 2 diabetes mellitus without complication, without long-term current use of insulin (HCC)  Stable, check labs. Encouraged patient to resume an exercise program.  -     AMB POC GLUCOSE BLOOD, BY GLUCOSE MONITORING DEVICE  -      DIABETES FOOT EXAM  -     HEMOGLOBIN A1C WITH EAG; Future  -     LIPID PANEL; Future  -     METABOLIC PANEL, COMPREHENSIVE; Future  -     MICROALBUMIN, UR, RAND W/ MICROALB/CREAT RATIO; Future  -     metFORMIN ER (GLUCOPHAGE XR) 500 mg tablet; TAKE 2 TABLETS DAILY WITH BREAKFAST AND THEN 2 TABLETS DAILY WITH DINNER, Normal, Disp-360 Tablet, R-3  -     glipiZIDE (GLUCOTROL) 5 mg tablet; TAKE 1 TABLET BY MOUTH EVERY DAY 30 MINUTES BEFORE DINNER, Normal, Disp-90 Tablet, R-3  -     atorvastatin (LIPITOR) 20 mg tablet; Take 1 Tablet by mouth daily. , Normal, Disp-90 Tablet, R-3  2. Essential hypertension  Fair control, continue with current medications. -     CBC W/O DIFF; Future  -     TSH 3RD GENERATION; Future  -     metoprolol tartrate (LOPRESSOR) 25 mg tablet; TAKE ONE TABLET BY MOUTH TWICE DAILY FOR HYPERTENSION, Normal, Disp-180 Tablet, R-3  -     triamterene-hydroCHLOROthiazide (MAXZIDE) 37.5-25 mg per tablet; TAKE ONE TABLET BY MOUTH ONE TIME DAILY, Normal, Disp-90 Tablet, R-3  -     amLODIPine (NORVASC) 5 mg tablet; TAKE 1 TABLET BY MOUTH EVERY DAY, Normal, Disp-90 Tablet, R-3  3. Elevated PSA  Reviewed and will recheck with Free and total.  -     PSA, TOTAL &  FREE; Future  4. Screening for colon cancer  -     OCCULT BLOOD IMMUNOASSAY,DIAGNOSTIC; Future      Follow-up and Dispositions    Return for follow up for F2F in 6 months for DM, 2 week VV for results. SUBJECTIVE:  DM:  Patient is taking Metformin, Glucotrol regularly without any side effects.   Checks FBG sporadically: 120-130. Not exercising regularly as before. HTN:  Patient is taking Lopressor BID, Maxzide, Norvasc regularly without any side effects. Elevated PSA: Denies any urine sx. Fhx:  no prostate CA. Review of Systems   Constitutional:  Negative for appetite change, diaphoresis, fatigue and fever. Respiratory:  Negative for cough and wheezing. Cardiovascular:  Negative for chest pain, palpitations and leg swelling. Endocrine: Negative for polydipsia and polyuria. Genitourinary:  Negative for difficulty urinating. Musculoskeletal:  Negative for myalgias. Neurological:  Negative for syncope and numbness. OBJECTIVE:  Blood pressure 131/80, pulse 74, temperature 98.2 °F (36.8 °C), temperature source Temporal, height 5' 8.11\" (1.73 m), weight 247 lb 6.4 oz (112.2 kg), SpO2 99 %. Physical Exam  CONSTITUTIONAL:  Well developed. No apparent distress. PSYCHIATRIC: Oriented to time, place, person & situation. Appropriate mood and affect. NECK:  Normal inspection, normal palpation without any lymphadenopathy, masses, or thyromegaly  CARDIOVASCULAR:  Regular rate and rhythm. Normal S1, S2. No extra sounds. RESPIRATORY:  Normal effort. Normal ascultation without wheezing. VASCULAR:  Normal Carotid pulses without bruits. Normal Posterior Tibialis pulses. ABDOMEN:  Normal bowel sounds. Abdomen soft, non tender. No hepatosplenomegaly or masses. NEUROLOGICAL:  Filament testing of feet and toes is normal.  EXTREMITIES:  No edema. Feet without sores or calluses. SKIN:  Lt upper chest 1 cm subcutaneous inclusion cyst without erythema or tenderness. Results for orders placed or performed in visit on 10/28/22   AMB POC GLUCOSE BLOOD, BY GLUCOSE MONITORING DEVICE   Result Value Ref Range    Glucose POC nf 158 MG/DL         An electronic signature was used to authenticate this note.   -- Reginaldo Wu MD

## 2022-10-28 NOTE — PROGRESS NOTES
I have printed AVS and reviewed it with patient today. Patient verbalized understanding. I reviewed with patient medications sent to pharmacy and how the medication is taken. Patient verbalized understanding of his medications. The patient was given coupons for prescriptions and I explained how the coupons are used. Patient verbalized understanding. The patient was given the requisition for the ordered labs and given the information for the off-site lab facilities. The patient will use the [facility] lab. Patient verbalized understanding. Patient given FIT test and explained thoroughly to patient. I showed patient  how to label vial with date and time and how to put all of this in the envelope and to return to the clinic on the same day as sample is collected. Patient  instructed to return test promptly as it must be received and processed within 24 hours of collection. Patient verbalized understanding. I instructed patient to schedule a follow-up appointment prior to leaving today. Patient verbalized understanding. Patient correctly stated his full name and date of birth prior to the information shared. No  was needed.  Lelia Castillo RN

## 2022-10-29 LAB — TSH SERPL DL<=0.05 MIU/L-ACNC: 3.34 UIU/ML (ref 0.36–3.74)

## 2022-10-30 LAB
PSA FREE MFR SERPL: 5 %
PSA FREE SERPL-MCNC: 0.36 NG/ML
PSA SERPL-MCNC: 7.2 NG/ML (ref 0–4)

## 2022-11-14 ENCOUNTER — VIRTUAL VISIT (OUTPATIENT)
Dept: FAMILY MEDICINE CLINIC | Age: 57
End: 2022-11-14

## 2022-11-14 DIAGNOSIS — E78.2 MIXED HYPERLIPIDEMIA: ICD-10-CM

## 2022-11-14 DIAGNOSIS — R97.20 ELEVATED PSA: ICD-10-CM

## 2022-11-14 DIAGNOSIS — E11.9 CONTROLLED TYPE 2 DIABETES MELLITUS WITHOUT COMPLICATION, WITHOUT LONG-TERM CURRENT USE OF INSULIN (HCC): Primary | ICD-10-CM

## 2022-11-14 PROCEDURE — 99442 PR PHYS/QHP TELEPHONE EVALUATION 11-20 MIN: CPT | Performed by: FAMILY MEDICINE

## 2022-11-14 NOTE — PROGRESS NOTES
Tc to the pt for intake. He verified his name and . Last week FBS= 137 last week. Coordination of Care  1. Have you been to the ER, urgent care clinic since your last visit? Hospitalized since your last visit? No    2. Have you seen or consulted any other health care providers outside of the [address] [place] [address] since your last visit? Include any pap smears or colon screening. No    Does the patient need refills?  NO    Learning Assessment Complete? no  Depression Screening complete in the past 12 months? yes

## 2022-11-14 NOTE — PROGRESS NOTES
Tc to the pt to discharge him this wa sto explain the AN process. The pt did not answer. The mailbox is full and can not accept any messages at this time.  Rachelle Garcia RN

## 2022-11-14 NOTE — PROGRESS NOTES
Joby Vargas (: 1965) is a 62 y.o. male, established patient, Virtual Visit for evaluation of the following chief complaint(s):   Results (Follow up)       ASSESSMENT/PLAN:  1. Controlled type 2 diabetes mellitus without complication, without long-term current use of insulin ([address]  Not as well controlled. Pt states he plans on renewing his exercise program so will continue with current regimen and follow up in 6 months. 2. Mixed hyperlipidemia  Controlled, continue current medication  3. Elevated PSA  Continue with increase with low Free% which also increases risk. This increased risk of cancer was reviewed with pt. Recommended Urology evaluation and patient agrees. -     REFERRAL TO UROLOGY    Return for follow up as scheduled. .    SUBJECTIVE/OBJECTIVE:  VV for lab review  DM:  Patient is taking Metformin, Glucotrol regularly without any side effects. Checks FBG sporadically: 120-130. Not exercising regularly as before. Elevated PSA: Denies any urine sx. Fhx:  no prostate CA. Review of Systems   Constitutional:  Negative for fatigue and unexpected weight change. Respiratory:  Negative for shortness of breath and wheezing. Cardiovascular:  Negative for chest pain. Endocrine: Negative for polydipsia and polyuria. Neurological:  Negative for syncope and numbness. Patient-Reported Systolic (Top): 466  Patient-Reported Diastolic (Bottom): 80    Physical Exam    On this date 2022 I have spent 12 minutes reviewing previous notes, test results and face to face (virtual) with the patient discussing the diagnosis and importance of compliance with the treatment plan as well as documenting on the day of the visit. Joby Vargas, was evaluated through a synchronous (real-time) audio-video encounter. The patient (or guardian if applicable) is aware that this is a billable service, which includes applicable co-pays.  This Virtual Visit was conducted with patient's (and/or legal guardian's) consent. The visit was conducted pursuant to the emergency declaration under the [facility] [address] and the [employer]. Patient identification was verified, and a caregiver was present when appropriate. The patient was located at: [facility]: [facility]  The provider was located at: Home: [unfilled]     Patient identification was verified at the start of the visit: YES    Services were provided through a phone synchronous discussion virtually to substitute for in-person clinic visit. Patient was located at home and provider was located in office or at home. An electronic signature was used to authenticate this note.   -- Myriam Klein MD

## 2022-11-15 NOTE — TELEPHONE ENCOUNTER
Refill request for Triamterene/ HTCZ sent 10/25/22. This was refilled 10/28/22. Closing encounter for refill.  Mariano Ross RN

## 2022-11-16 ENCOUNTER — LAB ONLY (OUTPATIENT)
Dept: FAMILY MEDICINE CLINIC | Age: 57
End: 2022-11-16

## 2022-11-16 ENCOUNTER — HOSPITAL ENCOUNTER (OUTPATIENT)
Dept: LAB | Age: 57
Discharge: HOME | End: 2022-11-16

## 2022-11-16 DIAGNOSIS — Z12.11 SCREENING FOR COLON CANCER: ICD-10-CM

## 2022-11-16 PROCEDURE — 82274 ASSAY TEST FOR BLOOD FECAL: CPT

## 2022-11-16 NOTE — PROGRESS NOTES
Patient dropped off stool sample for occult blood (Fit Kit) testing. Sample received and sent to lab for testing.

## 2022-11-18 LAB — HEMOCCULT STL QL IA: NEGATIVE

## 2022-11-25 ENCOUNTER — TELEPHONE (OUTPATIENT)
Dept: FAMILY MEDICINE CLINIC | Age: 57
End: 2022-11-25

## 2022-11-25 NOTE — TELEPHONE ENCOUNTER
Have called patient several times in an attempt to screen and schedule for AN financial screening. Patient has not answered, not able to leave a voice mail due to a \"full VM box\". OW have sent texts requesting for patient's response. Patient has not called back.

## 2023-02-15 DIAGNOSIS — E11.9 CONTROLLED TYPE 2 DIABETES MELLITUS WITHOUT COMPLICATION, WITHOUT LONG-TERM CURRENT USE OF INSULIN (HCC): ICD-10-CM

## 2023-02-15 DIAGNOSIS — I10 ESSENTIAL HYPERTENSION: ICD-10-CM

## 2023-02-15 RX ORDER — METFORMIN HYDROCHLORIDE 500 MG/1
TABLET, EXTENDED RELEASE ORAL
Qty: 360 TABLET | Refills: 3 | Status: SHIPPED | OUTPATIENT
Start: 2023-02-15

## 2023-02-15 RX ORDER — AMLODIPINE BESYLATE 5 MG/1
TABLET ORAL
Qty: 90 TABLET | Refills: 3 | Status: SHIPPED | OUTPATIENT
Start: 2023-02-15

## 2023-02-15 RX ORDER — ATORVASTATIN CALCIUM 20 MG/1
20 TABLET, FILM COATED ORAL DAILY
Qty: 90 TABLET | Refills: 3 | Status: SHIPPED | OUTPATIENT
Start: 2023-02-15

## 2023-02-15 RX ORDER — GLIPIZIDE 5 MG/1
TABLET ORAL
Qty: 90 TABLET | Refills: 3 | Status: SHIPPED | OUTPATIENT
Start: 2023-02-15

## 2023-02-15 NOTE — TELEPHONE ENCOUNTER
Received refill requests on the 4 medications from the pt's Pharmacy-Metformin, Glipizide, Atorvastatin, Amlodipine. The pt's last appt was Virtual 11/14/22. He was a no show for his lab appt 11/16/22.  This request was routed to the provider who he last had an appt with for approval. Jesús Rene RN

## 2023-05-21 RX ORDER — AMLODIPINE BESYLATE 5 MG/1
1 TABLET ORAL DAILY
COMMUNITY
Start: 2023-02-15

## 2023-05-21 RX ORDER — ATORVASTATIN CALCIUM 20 MG/1
20 TABLET, FILM COATED ORAL DAILY
COMMUNITY
Start: 2023-02-15

## 2023-05-21 RX ORDER — ASPIRIN 81 MG/1
81 TABLET ORAL
COMMUNITY

## 2023-05-21 RX ORDER — METFORMIN HYDROCHLORIDE 500 MG/1
TABLET, EXTENDED RELEASE ORAL
COMMUNITY
Start: 2023-02-15

## 2023-05-21 RX ORDER — GLIPIZIDE 5 MG/1
TABLET ORAL
COMMUNITY
Start: 2023-02-15

## 2023-05-21 RX ORDER — TRIAMTERENE AND HYDROCHLOROTHIAZIDE 37.5; 25 MG/1; MG/1
1 TABLET ORAL DAILY
COMMUNITY
Start: 2022-10-28

## 2023-10-16 RX ORDER — TRIAMTERENE AND HYDROCHLOROTHIAZIDE 37.5; 25 MG/1; MG/1
1 TABLET ORAL DAILY
Qty: 90 TABLET | Refills: 3 | Status: SHIPPED | OUTPATIENT
Start: 2023-10-16

## 2023-10-25 NOTE — TELEPHONE ENCOUNTER
Raymundo from Virginie Hernández at the [facility]. She left a message to call her. Virginie Hernández was called. The pharmacist stated she was waiting on a refill request for the pt for Triamterene- HCTZ to be responded to that she had sent several. The Pharmacist was told the pt's Triamterene-HCTZ rx was filled back in Nov. The pt had requested to have it filled at the [facility] in [employer]. He had #90, w/1 refill. Virginie Hernández was told this nurse had spoken with the pt yesterday and he was told his rx had 1 refill of #90 on it and he could pick it up there or he could call the [facility] and have the rx transferred to the [facility] if he wanted. The pt stated he was going to call [facility] and have [facility] to the [facility]. Elina at the [facility] stated she would call the pt and ask him what he would like to do.  Omar Winters RN
patient exam, chart review, coordination of care  with patient, team

## 2024-01-15 RX ORDER — AMLODIPINE BESYLATE 5 MG/1
5 TABLET ORAL DAILY
Qty: 30 TABLET | Refills: 0 | Status: SHIPPED | OUTPATIENT
Start: 2024-01-15

## 2024-01-15 RX ORDER — GLIPIZIDE 5 MG/1
TABLET ORAL
Qty: 30 TABLET | Refills: 0 | Status: SHIPPED | OUTPATIENT
Start: 2024-01-15

## 2024-01-15 RX ORDER — METFORMIN HYDROCHLORIDE 500 MG/1
1000 TABLET, EXTENDED RELEASE ORAL 2 TIMES DAILY
Qty: 60 TABLET | Refills: 0 | Status: SHIPPED | OUTPATIENT
Start: 2024-01-15

## 2024-03-21 ENCOUNTER — HOSPITAL ENCOUNTER (OUTPATIENT)
Facility: HOSPITAL | Age: 59
Setting detail: SPECIMEN
Discharge: HOME | End: 2024-03-24

## 2024-03-21 DIAGNOSIS — E11.9 TYPE 2 DIABETES MELLITUS WITHOUT COMPLICATION, WITHOUT LONG-TERM CURRENT USE OF INSULIN (HCC): ICD-10-CM

## 2024-03-21 DIAGNOSIS — Z13.9 SCREENING DUE: ICD-10-CM

## 2024-03-21 DIAGNOSIS — E78.2 MIXED HYPERLIPIDEMIA: ICD-10-CM

## 2024-03-21 DIAGNOSIS — R97.20 ELEVATED PROSTATE SPECIFIC ANTIGEN (PSA): ICD-10-CM

## 2024-03-21 LAB
EST. AVERAGE GLUCOSE BLD GHB EST-MCNC: 223 MG/DL
HBA1C MFR BLD: 9.4 % (ref 4–5.6)

## 2024-03-21 PROCEDURE — 82570 ASSAY OF URINE CREATININE: CPT

## 2024-03-21 PROCEDURE — 86803 HEPATITIS C AB TEST: CPT

## 2024-03-21 PROCEDURE — 36415 COLL VENOUS BLD VENIPUNCTURE: CPT

## 2024-03-21 PROCEDURE — 80061 LIPID PANEL: CPT

## 2024-03-21 PROCEDURE — 80053 COMPREHEN METABOLIC PANEL: CPT

## 2024-03-21 PROCEDURE — 83036 HEMOGLOBIN GLYCOSYLATED A1C: CPT

## 2024-03-21 PROCEDURE — 84154 ASSAY OF PSA FREE: CPT

## 2024-03-21 PROCEDURE — 84153 ASSAY OF PSA TOTAL: CPT

## 2024-03-21 PROCEDURE — 82043 UR ALBUMIN QUANTITATIVE: CPT

## 2024-03-22 LAB
ALBUMIN SERPL-MCNC: 4.3 G/DL (ref 3.5–5)
ALBUMIN/GLOB SERPL: 1.4 (ref 1.1–2.2)
ALP SERPL-CCNC: 98 U/L (ref 45–117)
ALT SERPL-CCNC: 38 U/L (ref 12–78)
ANION GAP SERPL CALC-SCNC: 7 MMOL/L (ref 5–15)
AST SERPL-CCNC: 19 U/L (ref 15–37)
BILIRUB SERPL-MCNC: 0.4 MG/DL (ref 0.2–1)
BUN SERPL-MCNC: 16 MG/DL (ref 6–20)
BUN/CREAT SERPL: 11 (ref 12–20)
CALCIUM SERPL-MCNC: 9.5 MG/DL (ref 8.5–10.1)
CHLORIDE SERPL-SCNC: 100 MMOL/L (ref 97–108)
CHOLEST SERPL-MCNC: 128 MG/DL
CO2 SERPL-SCNC: 31 MMOL/L (ref 21–32)
CREAT SERPL-MCNC: 1.46 MG/DL (ref 0.7–1.3)
CREAT UR-MCNC: 85.5 MG/DL
GLOBULIN SER CALC-MCNC: 3.1 G/DL (ref 2–4)
GLUCOSE SERPL-MCNC: 196 MG/DL (ref 65–100)
HCV AB SER IA-ACNC: <0.02 INDEX
HCV AB SERPL QL IA: NONREACTIVE
HDLC SERPL-MCNC: 52 MG/DL
HDLC SERPL: 2.5 (ref 0–5)
LDLC SERPL CALC-MCNC: 59.2 MG/DL (ref 0–100)
MICROALBUMIN UR-MCNC: 1.25 MG/DL
MICROALBUMIN/CREAT UR-RTO: 15 MG/G (ref 0–30)
POTASSIUM SERPL-SCNC: 4.4 MMOL/L (ref 3.5–5.1)
PROT SERPL-MCNC: 7.4 G/DL (ref 6.4–8.2)
SODIUM SERPL-SCNC: 138 MMOL/L (ref 136–145)
TRIGL SERPL-MCNC: 84 MG/DL
VLDLC SERPL CALC-MCNC: 16.8 MG/DL

## 2024-03-23 LAB
PSA FREE MFR SERPL: 4.5 %
PSA FREE SERPL-MCNC: 0.41 NG/ML
PSA SERPL-MCNC: 9.2 NG/ML (ref 0–4)

## 2024-03-25 NOTE — RESULT ENCOUNTER NOTE
Please let the pt know that his A1C was much higher at 9.4, even though he had been out of glipizide for a bit it shouldn't be that high. I would like him to start taking the glipizide twice a day -- once before breakfast and once before dinner.    Also, his prostate lab, PSA continues to increase.  He really needs to see urology - I placed the AN referral but it looks like Jaylyn has been unable to contact him for financial screening.  He can also go straight to [facility] and use their financial assistance plans.    His creatinine is elevated which means his kidneys are struggling -- likely both from his blood pressure and diabetes.  We will need to get those both under control to prevent further damage.    His cholesterol is great and at goal. Continue atorvastatin.  His Hep C screen was negative.    He should follow up in June.

## 2024-09-26 ENCOUNTER — HOSPITAL ENCOUNTER (OUTPATIENT)
Facility: HOSPITAL | Age: 59
Setting detail: SPECIMEN
Discharge: HOME | End: 2024-09-29

## 2024-09-26 DIAGNOSIS — E11.21 TYPE 2 DIABETES WITH NEPHROPATHY (HCC): ICD-10-CM

## 2024-09-26 DIAGNOSIS — R97.20 ELEVATED PROSTATE SPECIFIC ANTIGEN (PSA): ICD-10-CM

## 2024-09-26 DIAGNOSIS — R79.89 ELEVATED SERUM CREATININE: ICD-10-CM

## 2024-09-26 PROCEDURE — 80048 BASIC METABOLIC PNL TOTAL CA: CPT

## 2024-09-26 PROCEDURE — 84153 ASSAY OF PSA TOTAL: CPT

## 2024-09-26 PROCEDURE — 83036 HEMOGLOBIN GLYCOSYLATED A1C: CPT

## 2024-09-27 LAB
ANION GAP SERPL CALC-SCNC: 8 MMOL/L (ref 2–12)
BUN SERPL-MCNC: 16 MG/DL (ref 6–20)
BUN/CREAT SERPL: 12 (ref 12–20)
CALCIUM SERPL-MCNC: 9.6 MG/DL (ref 8.5–10.1)
CHLORIDE SERPL-SCNC: 99 MMOL/L (ref 97–108)
CO2 SERPL-SCNC: 29 MMOL/L (ref 21–32)
CREAT SERPL-MCNC: 1.39 MG/DL (ref 0.7–1.3)
EST. AVERAGE GLUCOSE BLD GHB EST-MCNC: 177 MG/DL
GLUCOSE SERPL-MCNC: 178 MG/DL (ref 65–100)
HBA1C MFR BLD: 7.8 % (ref 4–5.6)
POTASSIUM SERPL-SCNC: 3.5 MMOL/L (ref 3.5–5.1)
PSA SERPL-MCNC: 1 NG/ML (ref 0.01–4)
SODIUM SERPL-SCNC: 136 MMOL/L (ref 136–145)

## 2024-09-28 LAB
PSA SERPL-MCNC: 0.8 NG/ML (ref 0–4)
REFLEX CRITERIA: NORMAL

## 2024-10-03 NOTE — RESULT ENCOUNTER NOTE
A1c has improved and is 7.8, down from 9.4.  Kidney function stable, continue to monitor. Normal PSA.  Testosterone labs have been ordered, to be drawn prior to 9 am.

## 2024-12-04 ENCOUNTER — HOSPITAL ENCOUNTER (OUTPATIENT)
Facility: HOSPITAL | Age: 59
Setting detail: SPECIMEN
Discharge: HOME | End: 2024-12-07

## 2024-12-04 ENCOUNTER — LAB (OUTPATIENT)
Age: 59
End: 2024-12-04

## 2024-12-04 DIAGNOSIS — E78.2 MIXED HYPERLIPIDEMIA: ICD-10-CM

## 2024-12-04 DIAGNOSIS — R53.83 DECREASED ENERGY: ICD-10-CM

## 2024-12-04 PROCEDURE — 84146 ASSAY OF PROLACTIN: CPT

## 2024-12-04 PROCEDURE — 83002 ASSAY OF GONADOTROPIN (LH): CPT

## 2024-12-04 PROCEDURE — 83001 ASSAY OF GONADOTROPIN (FSH): CPT

## 2024-12-04 PROCEDURE — 80061 LIPID PANEL: CPT

## 2024-12-04 PROCEDURE — 84402 ASSAY OF FREE TESTOSTERONE: CPT

## 2024-12-04 PROCEDURE — 84403 ASSAY OF TOTAL TESTOSTERONE: CPT

## 2024-12-05 LAB — PROLACTIN SERPL-MCNC: 9 NG/ML

## 2024-12-06 LAB
CHOLEST SERPL-MCNC: 230 MG/DL
FSH SERPL-ACNC: 7.7 MIU/ML
HDLC SERPL-MCNC: 53 MG/DL
HDLC SERPL: 4.3 (ref 0–5)
LDLC SERPL CALC-MCNC: 151.6 MG/DL (ref 0–100)
LH SERPL-ACNC: 4.9 MIU/ML
TRIGL SERPL-MCNC: 127 MG/DL
VLDLC SERPL CALC-MCNC: 25.4 MG/DL

## 2024-12-06 NOTE — PROGRESS NOTES
2024  arrived 2:27 pm.    3:14 PM PANKAJ Saldivar (: 1965) is a 59 y.o. male, established patient, here for evaluation of the following chief complaint(s):  Medication Refill and Arm Pain (Patient c/o soreness on right arm x1 week/)  ASSESSMENT/PLAN: BMP next time.  He is excited to be rejoining a gym and working on lifestyle changes.  BP at goal, taking statin.  Wear elbow brace above the elbow.  Below is the assessment and plan developed based on review of pertinent history, physical exam, labs, studies, and medications.   1. Type 2 diabetes with nephropathy (HCC)  -     AMB POC GLUCOSE BLOOD, BY GLUCOSE MONITORING DEVICE  -     Hemoglobin A1C; Future  2. Tendinitis  3. Essential (primary) hypertension  4. Mixed hyperlipidemia    Return for 3 months LK DM, HTN.  SUBJECTIVE/OBJECTIVE:  HPI Was using a leaf blower.  Feels like his funny bone.  Also feels underneath scapula when he leans his head back.  Right handed.  Review of Systems Negative for fever and unexpected weight change, shortness of breath, leg swelling, abdominal pain, myalgias, dizziness. Negative for polyuria/polydipsia, chest pain, dyspnea, TIA's, numbness/tingling/pain in extremities, unusual visual symptoms, hypoglycemia symptoms, medication side effects.    Results for orders placed or performed in visit on 24   AMB POC GLUCOSE BLOOD, BY GLUCOSE MONITORING DEVICE   Result Value Ref Range    Glucose,  MG/DL   Fasting.  Took only bp medication this morning.  Wt Readings from Last 3 Encounters:   24 108.4 kg (239 lb)   24 107 kg (236 lb)   24 110.8 kg (244 lb 3.2 oz)       Lab Results   Component Value Date    LABA1C 7.8 (H) 2024    LABA1C 9.4 (H) 2024    LABGLOM 58 (L) 2024    MALBCR 15 2024    CREATININE 1.39 (H) 2024    ALT 38 2024    AST 19 2024    ALKPHOS 98 2024    BILITOT 0.4 2024    CHOL 230 (H) 2024    TRIG 127 2024    HDL 53

## 2024-12-07 LAB
TESTOST FREE SERPL-MCNC: 11.5 PG/ML (ref 7.2–24)
TESTOST SERPL-MCNC: 418 NG/DL (ref 264–916)

## 2024-12-13 ENCOUNTER — HOSPITAL ENCOUNTER (OUTPATIENT)
Facility: HOSPITAL | Age: 59
Setting detail: SPECIMEN
Discharge: HOME | End: 2024-12-16

## 2024-12-13 ENCOUNTER — OFFICE VISIT (OUTPATIENT)
Age: 59
End: 2024-12-13

## 2024-12-13 VITALS
DIASTOLIC BLOOD PRESSURE: 83 MMHG | HEART RATE: 78 BPM | TEMPERATURE: 98.1 F | WEIGHT: 239 LBS | BODY MASS INDEX: 36.43 KG/M2 | SYSTOLIC BLOOD PRESSURE: 128 MMHG | OXYGEN SATURATION: 95 %

## 2024-12-13 DIAGNOSIS — I10 ESSENTIAL (PRIMARY) HYPERTENSION: ICD-10-CM

## 2024-12-13 DIAGNOSIS — E78.2 MIXED HYPERLIPIDEMIA: ICD-10-CM

## 2024-12-13 DIAGNOSIS — M77.9 TENDINITIS: ICD-10-CM

## 2024-12-13 DIAGNOSIS — E11.21 TYPE 2 DIABETES WITH NEPHROPATHY (HCC): ICD-10-CM

## 2024-12-13 DIAGNOSIS — E11.21 TYPE 2 DIABETES WITH NEPHROPATHY (HCC): Primary | ICD-10-CM

## 2024-12-13 LAB — GLUCOSE, POC: 117 MG/DL

## 2024-12-13 PROCEDURE — 83036 HEMOGLOBIN GLYCOSYLATED A1C: CPT

## 2024-12-13 ASSESSMENT — PATIENT HEALTH QUESTIONNAIRE - PHQ9
2. FEELING DOWN, DEPRESSED OR HOPELESS: NOT AT ALL
1. LITTLE INTEREST OR PLEASURE IN DOING THINGS: NOT AT ALL
SUM OF ALL RESPONSES TO PHQ QUESTIONS 1-9: 0
SUM OF ALL RESPONSES TO PHQ9 QUESTIONS 1 & 2: 0
SUM OF ALL RESPONSES TO PHQ QUESTIONS 1-9: 0

## 2024-12-13 NOTE — PROGRESS NOTES
Patient discharged with the After Visit Summary. Provider's instructions for elbow brace use reviewed with the patient (wear during the day for 3 weeks). Patient instructed to schedule an appointment if symptoms don't improve or worsen, otherwise schedule a follow-up appointment to return in 3 months. Flu vaccine administered per order after obtaining consent, confirming VIS understanding, and ruling out contraindications. Patient instructed about signs of allergic reactions and when to seek emergency care. Patient verbalized understanding. Tolerated vaccination well, no adverse effects noted. Vaccination recorded in the [facility]. Patient given an opportunity to voice questions/concerns. All questions addressed to the patient's satisfaction.

## 2024-12-13 NOTE — PROGRESS NOTES
\"Have you been to the ER, urgent care clinic since your last visit?  Hospitalized since your last visit?\"    NO    “Have you seen or consulted any other health care providers outside our system since your last visit?”    NO      “Have you had a colorectal cancer screening such as a colonoscopy/FIT/Cologuard?    NO    No colonoscopy on file  No cologuard on file  Date of last FIT: 11/16/2022   No flexible sigmoidoscopy on file     “Have you had a diabetic eye exam?”    NO     No diabetic eye exam on file          Results for orders placed or performed in visit on 12/13/24   AMB POC GLUCOSE BLOOD, BY GLUCOSE MONITORING DEVICE   Result Value Ref Range    Glucose,  MG/DL     Non fasting

## 2024-12-14 ENCOUNTER — TELEPHONE (OUTPATIENT)
Age: 59
End: 2024-12-14

## 2024-12-14 LAB
EST. AVERAGE GLUCOSE BLD GHB EST-MCNC: 166 MG/DL
HBA1C MFR BLD: 7.4 % (ref 4–5.6)

## 2024-12-16 ENCOUNTER — TELEPHONE (OUTPATIENT)
Age: 59
End: 2024-12-16

## 2024-12-16 NOTE — TELEPHONE ENCOUNTER
12/16/2024  Your testosterone is normal and so is your LH/FSH and prolactin.  No additional labs or treatment is needed.  Exercising and diet will make an impact on your energy over time.  JUAN CARLOS Cantrell - NP

## 2025-01-06 DIAGNOSIS — E78.2 MIXED HYPERLIPIDEMIA: Primary | ICD-10-CM

## 2025-01-06 RX ORDER — SIMVASTATIN 40 MG
40 TABLET ORAL NIGHTLY
Qty: 90 TABLET | Refills: 3 | Status: SHIPPED | OUTPATIENT
Start: 2025-01-06 | End: 2025-01-06

## 2025-01-06 RX ORDER — ATORVASTATIN CALCIUM 40 MG/1
40 TABLET, FILM COATED ORAL DAILY
Qty: 90 TABLET | Refills: 3 | Status: SHIPPED | OUTPATIENT
Start: 2025-01-06

## 2025-01-06 NOTE — PROGRESS NOTES
1/6/2025  Diagnoses and all orders for this visit:    Mixed hyperlipidemia  -     Discontinue: simvastatin (ZOCOR) 40 MG tablet; Take 1 tablet by mouth nightly New dose 1/6/25.  -     atorvastatin (LIPITOR) 40 MG tablet; Take 1 tablet by mouth daily      JUAN CARLOS Cantrell - NP

## (undated) DEVICE — Device: Brand: DEFENDO VALVE AND CONNECTOR KIT

## (undated) DEVICE — LIGHT HANDLE: Brand: DEVON

## (undated) DEVICE — NEEDLE HYPO 22GA L1.5IN BLK S STL HUB POLYPR SHLD REG BVL

## (undated) DEVICE — DRAIN SURG 19FR SIL RND HUBLESS W/ 0.25IN BEND TRCR BLAK

## (undated) DEVICE — BLUNT TIP TROCAR: Brand: AUTO SUTURE

## (undated) DEVICE — STERILE POLYISOPRENE POWDER-FREE SURGICAL GLOVES: Brand: PROTEXIS

## (undated) DEVICE — SOL IRRIGATION INJ NACL 0.9% 500ML BTL

## (undated) DEVICE — (D)PREP SKN CHLRAPRP APPL 26ML -- CONVERT TO ITEM 371833

## (undated) DEVICE — UNIVERSAL FIXATION CANNULA: Brand: VERSAONE

## (undated) DEVICE — WIREGUIDED CYTOLOGY BRUSH: Brand: RX CYTOLOGY BRUSH

## (undated) DEVICE — POSITIONER HD REST SUPINE LAT

## (undated) DEVICE — KENDALL SCD EXPRESS SLEEVES, KNEE LENGTH, MEDIUM: Brand: KENDALL SCD

## (undated) DEVICE — BLOCK BITE BLOX W DENTAL RIM --

## (undated) DEVICE — TUBING INSUFLTN 10FT LUER -- CONVERT TO ITEM 368568

## (undated) DEVICE — 1200 GUARD II KIT W/5MM TUBE W/O VAC TUBE: Brand: GUARDIAN

## (undated) DEVICE — SURGICAL PROCEDURE KIT GEN LAPAROSCOPY LF

## (undated) DEVICE — SUTURE PDS II SZ 0 L27IN ABSRB VLT L26MM CT-2 1/2 CIR Z334H

## (undated) DEVICE — TRIPLE LUMEN NEEDLE KNIFE: Brand: RX NEEDLE KNIFE XL

## (undated) DEVICE — STERILE POLYISOPRENE POWDER-FREE SURGICAL GLOVES WITH EMOLLIENT COATING: Brand: PROTEXIS

## (undated) DEVICE — LAPAROSCOPIC WIRE L-HOOK ELECTRODE COATED: Brand: CLEANCOAT

## (undated) DEVICE — APPLICATOR BNDG 1MM ADH PREMIERPRO EXOFIN

## (undated) DEVICE — GUIDEWIRE ENDOSCP WRK L450CM DIA0.035IN STD SHFT STR RND

## (undated) DEVICE — REM POLYHESIVE ADULT PATIENT RETURN ELECTRODE: Brand: VALLEYLAB

## (undated) DEVICE — Device

## (undated) DEVICE — DEVICE LCK BILI RAP EXCHG OLPS --

## (undated) DEVICE — CANNULA ENDOSCP 5FR L210CM 0.018IN BILI ERCP 5-4-3 TIP

## (undated) DEVICE — RETRIEVAL BALLOON CATHETER: Brand: EXTRACTOR™ PRO RX

## (undated) DEVICE — SYR 50ML SLIP TIP NSAF LF STRL --

## (undated) DEVICE — CLIP LIG ABSRB HEM-LOK LG PUR --

## (undated) DEVICE — ENDO CARRY-ON PROCEDURE KIT INCLUDES ENZYMATIC SPONGE, GAUZE, BIOHAZARD LABEL, TRAY, LUBRICANT, DIRTY SCOPE LABEL, WATER LABEL, TRAY, DRAWSTRING PAD, AND DEFENDO 4-PIECE KIT.: Brand: ENDO CARRY-ON PROCEDURE KIT

## (undated) DEVICE — AGENT HEMSTAT 3GM PURIFIED PLNT STARCH PWD ABSRB ARISTA AH

## (undated) DEVICE — SPECIMEN RETRIEVAL POUCH: Brand: ENDO CATCH GOLD

## (undated) DEVICE — CARTRIDGE CLP LIG HEMLOK GRN --

## (undated) DEVICE — BLADELESS OPTICAL TROCAR WITH FIXATION CANNULA: Brand: VERSAPORT

## (undated) DEVICE — MEDI-VAC NON-CONDUCTIVE SUCTION TUBING: Brand: CARDINAL HEALTH

## (undated) DEVICE — SOLUTION IRRIG 1000ML H2O STRL BLT

## (undated) DEVICE — BW-412T DISP COMBO CLEANING BRUSH: Brand: SINGLE USE COMBINATION CLEANING BRUSH

## (undated) DEVICE — SUTURE MCRYL SZ 4-0 L27IN ABSRB UD L19MM PS-2 1/2 CIR PRIM Y426H

## (undated) DEVICE — FLOSEAL HEMOSTATIC MATRIX, 10 ML: Brand: FLOSEAL

## (undated) DEVICE — DEVON™ KNEE AND BODY STRAP 60" X 3" (1.5 M X 7.6 CM): Brand: DEVON

## (undated) DEVICE — CLICKLINE SCISSORS INSERT: Brand: CLICKLINE

## (undated) DEVICE — DEVICE TRNSF SPIK STL 2008S] MICROTEK MEDICAL INC]

## (undated) DEVICE — INFECTION CONTROL KIT SYS

## (undated) DEVICE — 3000CC GUARDIAN II: Brand: GUARDIAN

## (undated) DEVICE — APPLICATOR SEAL FLOSEAL 5MM+ --

## (undated) DEVICE — SPHINCTEROTOME: Brand: DREAMTOME™ RX 44